# Patient Record
Sex: MALE | Race: WHITE | Employment: UNEMPLOYED | URBAN - METROPOLITAN AREA
[De-identification: names, ages, dates, MRNs, and addresses within clinical notes are randomized per-mention and may not be internally consistent; named-entity substitution may affect disease eponyms.]

---

## 2023-07-27 ENCOUNTER — APPOINTMENT (OUTPATIENT)
Dept: MRI IMAGING | Age: 58
DRG: 045 | End: 2023-07-27
Attending: INTERNAL MEDICINE
Payer: MEDICAID

## 2023-07-27 ENCOUNTER — HOSPITAL ENCOUNTER (INPATIENT)
Age: 58
LOS: 7 days | Discharge: HOME | DRG: 045 | End: 2023-08-03
Attending: INTERNAL MEDICINE | Admitting: INTERNAL MEDICINE
Payer: MEDICAID

## 2023-07-27 PROBLEM — I16.1 HYPERTENSIVE EMERGENCY: Status: ACTIVE | Noted: 2023-07-27

## 2023-07-27 LAB
ALBUMIN SERPL-MCNC: 4.1 GM/DL (ref 3.4–5)
ALP BLD-CCNC: 116 IU/L (ref 40–128)
ALT SERPL-CCNC: 16 U/L (ref 10–40)
ANION GAP SERPL CALCULATED.3IONS-SCNC: 13 MMOL/L (ref 4–16)
AST SERPL-CCNC: 14 IU/L (ref 15–37)
BILIRUB SERPL-MCNC: 0.5 MG/DL (ref 0–1)
BUN SERPL-MCNC: 18 MG/DL (ref 6–23)
CALCIUM SERPL-MCNC: 8.8 MG/DL (ref 8.3–10.6)
CHLORIDE BLD-SCNC: 96 MMOL/L (ref 99–110)
CHOLEST SERPL-MCNC: 278 MG/DL
CO2: 24 MMOL/L (ref 21–32)
CREAT SERPL-MCNC: 1.1 MG/DL (ref 0.9–1.3)
ESTIMATED AVERAGE GLUCOSE: 143 MG/DL
GFR SERPL CREATININE-BSD FRML MDRD: >60 ML/MIN/1.73M2
GLUCOSE BLD-MCNC: 137 MG/DL (ref 70–99)
GLUCOSE BLD-MCNC: 150 MG/DL (ref 70–99)
GLUCOSE BLD-MCNC: 167 MG/DL (ref 70–99)
GLUCOSE BLD-MCNC: 176 MG/DL (ref 70–99)
GLUCOSE SERPL-MCNC: 156 MG/DL (ref 70–99)
HBA1C MFR BLD: 6.6 % (ref 4.2–6.3)
HCT VFR BLD CALC: 42.3 % (ref 42–52)
HDLC SERPL-MCNC: 36 MG/DL
HEMOGLOBIN: 14 GM/DL (ref 13.5–18)
LDLC SERPL CALC-MCNC: 190 MG/DL
LV EF: 58 %
LVEF MODALITY: NORMAL
MCH RBC QN AUTO: 27.5 PG (ref 27–31)
MCHC RBC AUTO-ENTMCNC: 33.1 % (ref 32–36)
MCV RBC AUTO: 82.9 FL (ref 78–100)
PDW BLD-RTO: 12.1 % (ref 11.7–14.9)
PLATELET # BLD: 236 K/CU MM (ref 140–440)
PMV BLD AUTO: 9.6 FL (ref 7.5–11.1)
POTASSIUM SERPL-SCNC: 3.7 MMOL/L (ref 3.5–5.1)
RBC # BLD: 5.1 M/CU MM (ref 4.6–6.2)
SODIUM BLD-SCNC: 133 MMOL/L (ref 135–145)
TOTAL PROTEIN: 6.5 GM/DL (ref 6.4–8.2)
TRIGL SERPL-MCNC: 261 MG/DL
WBC # BLD: 11.2 K/CU MM (ref 4–10.5)

## 2023-07-27 PROCEDURE — 82962 GLUCOSE BLOOD TEST: CPT

## 2023-07-27 PROCEDURE — 83036 HEMOGLOBIN GLYCOSYLATED A1C: CPT

## 2023-07-27 PROCEDURE — 97535 SELF CARE MNGMENT TRAINING: CPT

## 2023-07-27 PROCEDURE — 6370000000 HC RX 637 (ALT 250 FOR IP): Performed by: NURSE PRACTITIONER

## 2023-07-27 PROCEDURE — 2500000003 HC RX 250 WO HCPCS: Performed by: INTERNAL MEDICINE

## 2023-07-27 PROCEDURE — 6360000002 HC RX W HCPCS: Performed by: INTERNAL MEDICINE

## 2023-07-27 PROCEDURE — 94761 N-INVAS EAR/PLS OXIMETRY MLT: CPT

## 2023-07-27 PROCEDURE — 70551 MRI BRAIN STEM W/O DYE: CPT

## 2023-07-27 PROCEDURE — 36415 COLL VENOUS BLD VENIPUNCTURE: CPT

## 2023-07-27 PROCEDURE — 85027 COMPLETE CBC AUTOMATED: CPT

## 2023-07-27 PROCEDURE — 93306 TTE W/DOPPLER COMPLETE: CPT

## 2023-07-27 PROCEDURE — 80053 COMPREHEN METABOLIC PANEL: CPT

## 2023-07-27 PROCEDURE — 99255 IP/OBS CONSLTJ NEW/EST HI 80: CPT | Performed by: PSYCHIATRY & NEUROLOGY

## 2023-07-27 PROCEDURE — 6370000000 HC RX 637 (ALT 250 FOR IP): Performed by: INTERNAL MEDICINE

## 2023-07-27 PROCEDURE — 2140000000 HC CCU INTERMEDIATE R&B

## 2023-07-27 PROCEDURE — 80061 LIPID PANEL: CPT

## 2023-07-27 PROCEDURE — 97116 GAIT TRAINING THERAPY: CPT

## 2023-07-27 PROCEDURE — 92610 EVALUATE SWALLOWING FUNCTION: CPT

## 2023-07-27 PROCEDURE — 2580000003 HC RX 258: Performed by: INTERNAL MEDICINE

## 2023-07-27 PROCEDURE — 97162 PT EVAL MOD COMPLEX 30 MIN: CPT

## 2023-07-27 PROCEDURE — 97166 OT EVAL MOD COMPLEX 45 MIN: CPT

## 2023-07-27 RX ORDER — ACETAMINOPHEN 500 MG
1000 TABLET ORAL ONCE
Status: COMPLETED | OUTPATIENT
Start: 2023-07-27 | End: 2023-07-27

## 2023-07-27 RX ORDER — AMLODIPINE BESYLATE 5 MG/1
5 TABLET ORAL DAILY
Status: DISCONTINUED | OUTPATIENT
Start: 2023-07-27 | End: 2023-07-28

## 2023-07-27 RX ORDER — LISINOPRIL AND HYDROCHLOROTHIAZIDE 20; 12.5 MG/1; MG/1
1 TABLET ORAL DAILY
Status: ON HOLD | COMMUNITY
End: 2023-08-02 | Stop reason: HOSPADM

## 2023-07-27 RX ORDER — ACETAMINOPHEN 325 MG/1
650 TABLET ORAL EVERY 4 HOURS PRN
Status: DISCONTINUED | OUTPATIENT
Start: 2023-07-27 | End: 2023-08-03 | Stop reason: HOSPADM

## 2023-07-27 RX ORDER — ONDANSETRON 4 MG/1
4 TABLET, ORALLY DISINTEGRATING ORAL EVERY 8 HOURS PRN
Status: DISCONTINUED | OUTPATIENT
Start: 2023-07-27 | End: 2023-08-03 | Stop reason: HOSPADM

## 2023-07-27 RX ORDER — LISINOPRIL AND HYDROCHLOROTHIAZIDE 20; 12.5 MG/1; MG/1
1 TABLET ORAL DAILY
Status: DISCONTINUED | OUTPATIENT
Start: 2023-07-27 | End: 2023-07-29

## 2023-07-27 RX ORDER — ASPIRIN 81 MG/1
81 TABLET, CHEWABLE ORAL DAILY
Status: DISCONTINUED | OUTPATIENT
Start: 2023-07-28 | End: 2023-08-03 | Stop reason: HOSPADM

## 2023-07-27 RX ORDER — INSULIN LISPRO 100 [IU]/ML
0-4 INJECTION, SOLUTION INTRAVENOUS; SUBCUTANEOUS NIGHTLY
Status: DISCONTINUED | OUTPATIENT
Start: 2023-07-27 | End: 2023-08-03 | Stop reason: HOSPADM

## 2023-07-27 RX ORDER — CLOPIDOGREL BISULFATE 75 MG/1
75 TABLET ORAL DAILY
Status: DISCONTINUED | OUTPATIENT
Start: 2023-07-27 | End: 2023-08-03 | Stop reason: HOSPADM

## 2023-07-27 RX ORDER — DEXTROSE MONOHYDRATE 100 MG/ML
INJECTION, SOLUTION INTRAVENOUS CONTINUOUS PRN
Status: DISCONTINUED | OUTPATIENT
Start: 2023-07-27 | End: 2023-08-03 | Stop reason: HOSPADM

## 2023-07-27 RX ORDER — LABETALOL HYDROCHLORIDE 5 MG/ML
10 INJECTION, SOLUTION INTRAVENOUS EVERY 10 MIN PRN
Status: DISCONTINUED | OUTPATIENT
Start: 2023-07-27 | End: 2023-08-03 | Stop reason: HOSPADM

## 2023-07-27 RX ORDER — INSULIN LISPRO 100 [IU]/ML
0-8 INJECTION, SOLUTION INTRAVENOUS; SUBCUTANEOUS
Status: DISCONTINUED | OUTPATIENT
Start: 2023-07-27 | End: 2023-08-03 | Stop reason: HOSPADM

## 2023-07-27 RX ORDER — ATORVASTATIN CALCIUM 40 MG/1
40 TABLET, FILM COATED ORAL DAILY
Status: ON HOLD | COMMUNITY
End: 2023-08-02 | Stop reason: HOSPADM

## 2023-07-27 RX ORDER — GLUCAGON 1 MG/ML
1 KIT INJECTION PRN
Status: DISCONTINUED | OUTPATIENT
Start: 2023-07-27 | End: 2023-08-03 | Stop reason: HOSPADM

## 2023-07-27 RX ORDER — ENOXAPARIN SODIUM 100 MG/ML
30 INJECTION SUBCUTANEOUS 2 TIMES DAILY
Status: DISCONTINUED | OUTPATIENT
Start: 2023-07-27 | End: 2023-08-03 | Stop reason: HOSPADM

## 2023-07-27 RX ORDER — ATORVASTATIN CALCIUM 40 MG/1
40 TABLET, FILM COATED ORAL NIGHTLY
Status: DISCONTINUED | OUTPATIENT
Start: 2023-07-27 | End: 2023-08-03 | Stop reason: HOSPADM

## 2023-07-27 RX ORDER — SODIUM CHLORIDE 0.9 % (FLUSH) 0.9 %
5-40 SYRINGE (ML) INJECTION PRN
Status: DISCONTINUED | OUTPATIENT
Start: 2023-07-27 | End: 2023-08-03 | Stop reason: HOSPADM

## 2023-07-27 RX ORDER — SODIUM CHLORIDE 0.9 % (FLUSH) 0.9 %
5-40 SYRINGE (ML) INJECTION EVERY 12 HOURS SCHEDULED
Status: DISCONTINUED | OUTPATIENT
Start: 2023-07-27 | End: 2023-08-03 | Stop reason: HOSPADM

## 2023-07-27 RX ORDER — SERTRALINE HYDROCHLORIDE 100 MG/1
100 TABLET, FILM COATED ORAL DAILY
COMMUNITY

## 2023-07-27 RX ORDER — ONDANSETRON 2 MG/ML
4 INJECTION INTRAMUSCULAR; INTRAVENOUS EVERY 6 HOURS PRN
Status: DISCONTINUED | OUTPATIENT
Start: 2023-07-27 | End: 2023-08-03 | Stop reason: HOSPADM

## 2023-07-27 RX ORDER — SODIUM CHLORIDE 9 MG/ML
INJECTION, SOLUTION INTRAVENOUS PRN
Status: DISCONTINUED | OUTPATIENT
Start: 2023-07-27 | End: 2023-08-03 | Stop reason: HOSPADM

## 2023-07-27 RX ORDER — ASPIRIN 300 MG/1
300 SUPPOSITORY RECTAL DAILY
Status: DISCONTINUED | OUTPATIENT
Start: 2023-07-28 | End: 2023-08-03 | Stop reason: HOSPADM

## 2023-07-27 RX ORDER — POLYETHYLENE GLYCOL 3350 17 G/17G
17 POWDER, FOR SOLUTION ORAL DAILY PRN
Status: DISCONTINUED | OUTPATIENT
Start: 2023-07-27 | End: 2023-08-03 | Stop reason: HOSPADM

## 2023-07-27 RX ADMIN — SERTRALINE HYDROCHLORIDE 100 MG: 50 TABLET ORAL at 09:12

## 2023-07-27 RX ADMIN — SODIUM CHLORIDE 5 MG/HR: 9 INJECTION, SOLUTION INTRAVENOUS at 19:08

## 2023-07-27 RX ADMIN — SODIUM CHLORIDE 5 MG/HR: 9 INJECTION, SOLUTION INTRAVENOUS at 06:01

## 2023-07-27 RX ADMIN — ACETAMINOPHEN 650 MG: 325 TABLET ORAL at 22:54

## 2023-07-27 RX ADMIN — SODIUM CHLORIDE 5 MG/HR: 9 INJECTION, SOLUTION INTRAVENOUS at 11:51

## 2023-07-27 RX ADMIN — ACETAMINOPHEN 650 MG: 325 TABLET ORAL at 18:58

## 2023-07-27 RX ADMIN — ATORVASTATIN CALCIUM 40 MG: 40 TABLET, FILM COATED ORAL at 20:46

## 2023-07-27 RX ADMIN — LABETALOL HYDROCHLORIDE 10 MG: 5 INJECTION, SOLUTION INTRAVENOUS at 05:44

## 2023-07-27 RX ADMIN — ENOXAPARIN SODIUM 30 MG: 100 INJECTION SUBCUTANEOUS at 20:46

## 2023-07-27 RX ADMIN — ACETAMINOPHEN 1000 MG: 500 TABLET ORAL at 05:43

## 2023-07-27 RX ADMIN — SODIUM CHLORIDE, PRESERVATIVE FREE 5 ML: 5 INJECTION INTRAVENOUS at 09:13

## 2023-07-27 RX ADMIN — ENOXAPARIN SODIUM 30 MG: 100 INJECTION SUBCUTANEOUS at 09:12

## 2023-07-27 RX ADMIN — CLOPIDOGREL BISULFATE 75 MG: 75 TABLET ORAL at 16:39

## 2023-07-27 RX ADMIN — SODIUM CHLORIDE 7.5 MG/HR: 9 INJECTION, SOLUTION INTRAVENOUS at 22:53

## 2023-07-27 ASSESSMENT — PAIN SCALES - WONG BAKER: WONGBAKER_NUMERICALRESPONSE: 0

## 2023-07-27 ASSESSMENT — PAIN DESCRIPTION - LOCATION
LOCATION: HEAD

## 2023-07-27 ASSESSMENT — PAIN SCALES - GENERAL
PAINLEVEL_OUTOF10: 10
PAINLEVEL_OUTOF10: 6
PAINLEVEL_OUTOF10: 0
PAINLEVEL_OUTOF10: 7
PAINLEVEL_OUTOF10: 0

## 2023-07-27 ASSESSMENT — PAIN DESCRIPTION - DESCRIPTORS
DESCRIPTORS: ACHING

## 2023-07-27 ASSESSMENT — PAIN DESCRIPTION - ONSET: ONSET: ON-GOING

## 2023-07-27 NOTE — CONSULTS
Neurology Service Consult Note  [address] [place] [address]   Patient Name: Sruthi Valadez  : 1965        Subjective:   Reason for consult: Dizziness  66 y.o.  male DM, HLD, HTN presenting to [address] [place] [address] with complaints of dizziness over several days. He describes room spinning dizziness that is worse with movement and turning his head. It improves with rest. Today he feels his symptoms have resolved. He does not have any focal or lateralizing weakness on exam today. He does ambulate with the use of a cane as he feels unsteady on his feet. He had hypertensive urgency on admission and admits to chronic poorly controlled blood pressure.        Past Medical History:   Diagnosis Date    Diabetes mellitus ([address])     Hyperlipidemia     Hypertension     :   Past Surgical History:   Procedure Laterality Date    BRAIN SURGERY       Medications:  Scheduled Meds:   sodium chloride flush  5-40 mL IntraVENous 2 times per day    enoxaparin  30 mg SubCUTAneous BID    atorvastatin  40 mg Oral Nightly    [START ON 2023] aspirin  81 mg Oral Daily    Or    [START ON 2023] aspirin  300 mg Rectal Daily    [Held by provider] amLODIPine  5 mg Oral Daily    [Held by provider] lisinopril-hydroCHLOROthiazide  1 tablet Oral Daily    sertraline  100 mg Oral Daily    insulin lispro  0-8 Units SubCUTAneous TID WC    insulin lispro  0-4 Units SubCUTAneous Nightly     Continuous Infusions:   sodium chloride      niCARdipine 5 mg/hr (23 0601)    dextrose       PRN Meds:.sodium chloride flush, sodium chloride, ondansetron **OR** ondansetron, polyethylene glycol, labetalol, acetaminophen, glucose, dextrose bolus **OR** dextrose bolus, glucagon (rDNA), dextrose    No Known Allergies  Social History     Socioeconomic History    Marital status: Single     Spouse name: Not on file    Number of children: Not on file    Years of education: Not on file    Highest education level: Not on file   Occupational
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
## 2023-07-28 ENCOUNTER — APPOINTMENT (OUTPATIENT)
Dept: CT IMAGING | Age: 58
DRG: 045 | End: 2023-07-28
Attending: INTERNAL MEDICINE
Payer: MEDICAID

## 2023-07-28 LAB
ANION GAP SERPL CALCULATED.3IONS-SCNC: 9 MMOL/L (ref 4–16)
BUN SERPL-MCNC: 11 MG/DL (ref 6–23)
CALCIUM SERPL-MCNC: 8.5 MG/DL (ref 8.3–10.6)
CHLORIDE BLD-SCNC: 100 MMOL/L (ref 99–110)
CO2: 24 MMOL/L (ref 21–32)
CREAT SERPL-MCNC: 0.9 MG/DL (ref 0.9–1.3)
GFR SERPL CREATININE-BSD FRML MDRD: >60 ML/MIN/1.73M2
GLUCOSE BLD-MCNC: 121 MG/DL (ref 70–99)
GLUCOSE BLD-MCNC: 129 MG/DL (ref 70–99)
GLUCOSE BLD-MCNC: 134 MG/DL (ref 70–99)
GLUCOSE BLD-MCNC: 152 MG/DL (ref 70–99)
GLUCOSE SERPL-MCNC: 168 MG/DL (ref 70–99)
HCT VFR BLD CALC: 42.8 % (ref 42–52)
HEMOGLOBIN: 14.3 GM/DL (ref 13.5–18)
MCH RBC QN AUTO: 27.9 PG (ref 27–31)
MCHC RBC AUTO-ENTMCNC: 33.4 % (ref 32–36)
MCV RBC AUTO: 83.6 FL (ref 78–100)
PDW BLD-RTO: 12.2 % (ref 11.7–14.9)
PLATELET # BLD: 237 K/CU MM (ref 140–440)
PMV BLD AUTO: 9.4 FL (ref 7.5–11.1)
POTASSIUM SERPL-SCNC: 3.8 MMOL/L (ref 3.5–5.1)
RBC # BLD: 5.12 M/CU MM (ref 4.6–6.2)
SODIUM BLD-SCNC: 133 MMOL/L (ref 135–145)
WBC # BLD: 9.1 K/CU MM (ref 4–10.5)

## 2023-07-28 PROCEDURE — 2580000003 HC RX 258: Performed by: INTERNAL MEDICINE

## 2023-07-28 PROCEDURE — 94761 N-INVAS EAR/PLS OXIMETRY MLT: CPT

## 2023-07-28 PROCEDURE — 99222 1ST HOSP IP/OBS MODERATE 55: CPT | Performed by: NURSE PRACTITIONER

## 2023-07-28 PROCEDURE — 85027 COMPLETE CBC AUTOMATED: CPT

## 2023-07-28 PROCEDURE — 6370000000 HC RX 637 (ALT 250 FOR IP): Performed by: NURSE PRACTITIONER

## 2023-07-28 PROCEDURE — 82962 GLUCOSE BLOOD TEST: CPT

## 2023-07-28 PROCEDURE — 70498 CT ANGIOGRAPHY NECK: CPT

## 2023-07-28 PROCEDURE — 70450 CT HEAD/BRAIN W/O DYE: CPT

## 2023-07-28 PROCEDURE — 36415 COLL VENOUS BLD VENIPUNCTURE: CPT

## 2023-07-28 PROCEDURE — 6360000002 HC RX W HCPCS: Performed by: INTERNAL MEDICINE

## 2023-07-28 PROCEDURE — 6360000004 HC RX CONTRAST MEDICATION: Performed by: PSYCHIATRY & NEUROLOGY

## 2023-07-28 PROCEDURE — 2500000003 HC RX 250 WO HCPCS: Performed by: INTERNAL MEDICINE

## 2023-07-28 PROCEDURE — 6370000000 HC RX 637 (ALT 250 FOR IP): Performed by: INTERNAL MEDICINE

## 2023-07-28 PROCEDURE — 2140000000 HC CCU INTERMEDIATE R&B

## 2023-07-28 PROCEDURE — 80048 BASIC METABOLIC PNL TOTAL CA: CPT

## 2023-07-28 PROCEDURE — 76937 US GUIDE VASCULAR ACCESS: CPT

## 2023-07-28 RX ORDER — DOXAZOSIN MESYLATE 1 MG/1
2 TABLET ORAL ONCE
Status: COMPLETED | OUTPATIENT
Start: 2023-07-28 | End: 2023-07-28

## 2023-07-28 RX ORDER — HYDRALAZINE HYDROCHLORIDE 25 MG/1
25 TABLET, FILM COATED ORAL EVERY 8 HOURS SCHEDULED
Status: DISCONTINUED | OUTPATIENT
Start: 2023-07-29 | End: 2023-07-29

## 2023-07-28 RX ORDER — AMLODIPINE BESYLATE 5 MG/1
5 TABLET ORAL ONCE
Status: COMPLETED | OUTPATIENT
Start: 2023-07-28 | End: 2023-07-28

## 2023-07-28 RX ORDER — AMLODIPINE BESYLATE 10 MG/1
10 TABLET ORAL DAILY
Status: DISCONTINUED | OUTPATIENT
Start: 2023-07-29 | End: 2023-08-03 | Stop reason: HOSPADM

## 2023-07-28 RX ADMIN — SODIUM CHLORIDE 5.5 MG/HR: 9 INJECTION, SOLUTION INTRAVENOUS at 11:14

## 2023-07-28 RX ADMIN — AMLODIPINE BESYLATE 5 MG: 5 TABLET ORAL at 08:42

## 2023-07-28 RX ADMIN — SODIUM CHLORIDE 6.5 MG/HR: 9 INJECTION, SOLUTION INTRAVENOUS at 10:58

## 2023-07-28 RX ADMIN — IOPAMIDOL 75 ML: 755 INJECTION, SOLUTION INTRAVENOUS at 12:31

## 2023-07-28 RX ADMIN — ASPIRIN 81 MG: 81 TABLET, CHEWABLE ORAL at 05:49

## 2023-07-28 RX ADMIN — SODIUM CHLORIDE, PRESERVATIVE FREE 10 ML: 5 INJECTION INTRAVENOUS at 20:53

## 2023-07-28 RX ADMIN — SODIUM CHLORIDE 3.5 MG/HR: 9 INJECTION, SOLUTION INTRAVENOUS at 20:52

## 2023-07-28 RX ADMIN — SODIUM CHLORIDE 7.5 MG/HR: 9 INJECTION, SOLUTION INTRAVENOUS at 02:43

## 2023-07-28 RX ADMIN — SODIUM CHLORIDE 4.5 MG/HR: 9 INJECTION, SOLUTION INTRAVENOUS at 13:39

## 2023-07-28 RX ADMIN — AMLODIPINE BESYLATE 5 MG: 5 TABLET ORAL at 18:52

## 2023-07-28 RX ADMIN — CLOPIDOGREL BISULFATE 75 MG: 75 TABLET ORAL at 08:42

## 2023-07-28 RX ADMIN — SERTRALINE HYDROCHLORIDE 100 MG: 50 TABLET ORAL at 08:42

## 2023-07-28 RX ADMIN — ENOXAPARIN SODIUM 30 MG: 100 INJECTION SUBCUTANEOUS at 20:53

## 2023-07-28 RX ADMIN — SODIUM CHLORIDE 7.5 MG/HR: 9 INJECTION, SOLUTION INTRAVENOUS at 06:30

## 2023-07-28 RX ADMIN — LISINOPRIL AND HYDROCHLOROTHIAZIDE 1 TABLET: 12.5; 2 TABLET ORAL at 08:42

## 2023-07-28 RX ADMIN — ATORVASTATIN CALCIUM 40 MG: 40 TABLET, FILM COATED ORAL at 20:53

## 2023-07-28 RX ADMIN — ACETAMINOPHEN 650 MG: 325 TABLET ORAL at 17:03

## 2023-07-28 RX ADMIN — DOXAZOSIN 2 MG: 1 TABLET ORAL at 18:52

## 2023-07-28 RX ADMIN — ACETAMINOPHEN 650 MG: 325 TABLET ORAL at 10:59

## 2023-07-28 RX ADMIN — ACETAMINOPHEN 650 MG: 325 TABLET ORAL at 23:31

## 2023-07-28 RX ADMIN — SODIUM CHLORIDE 3.5 MG/HR: 9 INJECTION, SOLUTION INTRAVENOUS at 14:23

## 2023-07-28 RX ADMIN — ENOXAPARIN SODIUM 30 MG: 100 INJECTION SUBCUTANEOUS at 08:42

## 2023-07-28 ASSESSMENT — PAIN DESCRIPTION - DESCRIPTORS
DESCRIPTORS: ACHING

## 2023-07-28 ASSESSMENT — PAIN DESCRIPTION - ORIENTATION
ORIENTATION: MID;OTHER (COMMENT)
ORIENTATION: OTHER (COMMENT)

## 2023-07-28 ASSESSMENT — PAIN DESCRIPTION - LOCATION
LOCATION: HEAD

## 2023-07-28 ASSESSMENT — PAIN - FUNCTIONAL ASSESSMENT
PAIN_FUNCTIONAL_ASSESSMENT: ACTIVITIES ARE NOT PREVENTED
PAIN_FUNCTIONAL_ASSESSMENT: PREVENTS OR INTERFERES SOME ACTIVE ACTIVITIES AND ADLS

## 2023-07-28 ASSESSMENT — PAIN SCALES - GENERAL
PAINLEVEL_OUTOF10: 8
PAINLEVEL_OUTOF10: 5
PAINLEVEL_OUTOF10: 9

## 2023-07-28 NOTE — CONSULTS
Vascular/Interventional Neurology Service Consult Note  [address] [place] [address]   Patient Name: Juan Patel  : 1965        Subjective:   Reason for consult:   Phoenix hansenmale past medical history HTN, HLD, DM presenting to [address] [place] [address] complaints of dizziness, off balance and headache for 2 to 3 days prior. He denied any speech changes or focal weaknesses. Patient was hypertensive in the ED requiring nicardipine drip. MRI revealed 3 small periventricular areas of diffusion restriction, likely acute infarcts. CTA moderate to severe stenosis of the origin of the P1 segment of the right PCA. Moderate diffuse irregularity involving the proximal segments of the left PCA. Patient states his dizziness has improved. He admits to noncompliance with his blood pressure medications.     Past Medical History:   Diagnosis Date    Diabetes mellitus ([address])     Hyperlipidemia     Hypertension     :   Past Surgical History:   Procedure Laterality Date    BRAIN SURGERY       Medications:  Scheduled Meds:   sodium chloride flush  5-40 mL IntraVENous 2 times per day    enoxaparin  30 mg SubCUTAneous BID    atorvastatin  40 mg Oral Nightly    aspirin  81 mg Oral Daily    Or    aspirin  300 mg Rectal Daily    amLODIPine  5 mg Oral Daily    lisinopril-hydroCHLOROthiazide  1 tablet Oral Daily    sertraline  100 mg Oral Daily    insulin lispro  0-8 Units SubCUTAneous TID WC    insulin lispro  0-4 Units SubCUTAneous Nightly    clopidogrel  75 mg Oral Daily     Continuous Infusions:   sodium chloride      dextrose      niCARdipine 3.5 mg/hr (23 1423)     PRN Meds:.sodium chloride flush, sodium chloride, ondansetron **OR** ondansetron, polyethylene glycol, labetalol, acetaminophen, glucose, dextrose bolus **OR** dextrose bolus, glucagon (rDNA), dextrose    No Known Allergies  Social History     Socioeconomic History    Marital status: Single     Spouse name: Not on file    Number of

## 2023-07-29 LAB
ANION GAP SERPL CALCULATED.3IONS-SCNC: 12 MMOL/L (ref 4–16)
BUN SERPL-MCNC: 11 MG/DL (ref 6–23)
CALCIUM SERPL-MCNC: 8.5 MG/DL (ref 8.3–10.6)
CHLORIDE BLD-SCNC: 100 MMOL/L (ref 99–110)
CO2: 22 MMOL/L (ref 21–32)
CREAT SERPL-MCNC: 1 MG/DL (ref 0.9–1.3)
GFR SERPL CREATININE-BSD FRML MDRD: >60 ML/MIN/1.73M2
GLUCOSE BLD-MCNC: 138 MG/DL (ref 70–99)
GLUCOSE BLD-MCNC: 179 MG/DL (ref 70–99)
GLUCOSE BLD-MCNC: 268 MG/DL (ref 70–99)
GLUCOSE SERPL-MCNC: 146 MG/DL (ref 70–99)
HCT VFR BLD CALC: 42.3 % (ref 42–52)
HEMOGLOBIN: 14 GM/DL (ref 13.5–18)
MCH RBC QN AUTO: 28 PG (ref 27–31)
MCHC RBC AUTO-ENTMCNC: 33.1 % (ref 32–36)
MCV RBC AUTO: 84.6 FL (ref 78–100)
PDW BLD-RTO: 12.2 % (ref 11.7–14.9)
PLATELET # BLD: 234 K/CU MM (ref 140–440)
PMV BLD AUTO: 9.6 FL (ref 7.5–11.1)
POTASSIUM SERPL-SCNC: 3.8 MMOL/L (ref 3.5–5.1)
RBC # BLD: 5 M/CU MM (ref 4.6–6.2)
SODIUM BLD-SCNC: 134 MMOL/L (ref 135–145)
WBC # BLD: 8.4 K/CU MM (ref 4–10.5)

## 2023-07-29 PROCEDURE — 6370000000 HC RX 637 (ALT 250 FOR IP): Performed by: FAMILY MEDICINE

## 2023-07-29 PROCEDURE — 82962 GLUCOSE BLOOD TEST: CPT

## 2023-07-29 PROCEDURE — 36415 COLL VENOUS BLD VENIPUNCTURE: CPT

## 2023-07-29 PROCEDURE — 2500000003 HC RX 250 WO HCPCS: Performed by: INTERNAL MEDICINE

## 2023-07-29 PROCEDURE — 6360000002 HC RX W HCPCS: Performed by: INTERNAL MEDICINE

## 2023-07-29 PROCEDURE — 94761 N-INVAS EAR/PLS OXIMETRY MLT: CPT

## 2023-07-29 PROCEDURE — 94664 DEMO&/EVAL PT USE INHALER: CPT

## 2023-07-29 PROCEDURE — 6370000000 HC RX 637 (ALT 250 FOR IP): Performed by: INTERNAL MEDICINE

## 2023-07-29 PROCEDURE — 2140000000 HC CCU INTERMEDIATE R&B

## 2023-07-29 PROCEDURE — 94150 VITAL CAPACITY TEST: CPT

## 2023-07-29 PROCEDURE — 85027 COMPLETE CBC AUTOMATED: CPT

## 2023-07-29 PROCEDURE — 6370000000 HC RX 637 (ALT 250 FOR IP): Performed by: NURSE PRACTITIONER

## 2023-07-29 PROCEDURE — 80048 BASIC METABOLIC PNL TOTAL CA: CPT

## 2023-07-29 PROCEDURE — 2580000003 HC RX 258: Performed by: INTERNAL MEDICINE

## 2023-07-29 RX ORDER — OXYCODONE HYDROCHLORIDE 5 MG/1
5 TABLET ORAL ONCE
Status: COMPLETED | OUTPATIENT
Start: 2023-07-29 | End: 2023-07-29

## 2023-07-29 RX ORDER — CHOLECALCIFEROL (VITAMIN D3) 125 MCG
5 CAPSULE ORAL NIGHTLY PRN
Status: DISCONTINUED | OUTPATIENT
Start: 2023-07-29 | End: 2023-08-03 | Stop reason: HOSPADM

## 2023-07-29 RX ORDER — LISINOPRIL 20 MG/1
40 TABLET ORAL DAILY
Status: DISCONTINUED | OUTPATIENT
Start: 2023-07-30 | End: 2023-08-03 | Stop reason: HOSPADM

## 2023-07-29 RX ORDER — LISINOPRIL AND HYDROCHLOROTHIAZIDE 20; 12.5 MG/1; MG/1
1 TABLET ORAL 2 TIMES DAILY
Status: DISCONTINUED | OUTPATIENT
Start: 2023-07-29 | End: 2023-07-29

## 2023-07-29 RX ORDER — ISOSORBIDE MONONITRATE 30 MG/1
60 TABLET, EXTENDED RELEASE ORAL DAILY
Status: DISCONTINUED | OUTPATIENT
Start: 2023-07-29 | End: 2023-07-30

## 2023-07-29 RX ORDER — HYDRALAZINE HYDROCHLORIDE 50 MG/1
50 TABLET, FILM COATED ORAL ONCE
Status: DISCONTINUED | OUTPATIENT
Start: 2023-07-29 | End: 2023-07-29

## 2023-07-29 RX ADMIN — ENOXAPARIN SODIUM 30 MG: 100 INJECTION SUBCUTANEOUS at 08:48

## 2023-07-29 RX ADMIN — SODIUM CHLORIDE 3.5 MG/HR: 9 INJECTION, SOLUTION INTRAVENOUS at 04:47

## 2023-07-29 RX ADMIN — ISOSORBIDE MONONITRATE 60 MG: 30 TABLET, EXTENDED RELEASE ORAL at 12:33

## 2023-07-29 RX ADMIN — OXYCODONE HYDROCHLORIDE 5 MG: 5 TABLET ORAL at 18:21

## 2023-07-29 RX ADMIN — HYDRALAZINE HYDROCHLORIDE 25 MG: 25 TABLET, FILM COATED ORAL at 08:49

## 2023-07-29 RX ADMIN — ACETAMINOPHEN 650 MG: 325 TABLET ORAL at 23:23

## 2023-07-29 RX ADMIN — SERTRALINE HYDROCHLORIDE 100 MG: 50 TABLET ORAL at 08:48

## 2023-07-29 RX ADMIN — ATORVASTATIN CALCIUM 40 MG: 40 TABLET, FILM COATED ORAL at 20:46

## 2023-07-29 RX ADMIN — LISINOPRIL AND HYDROCHLOROTHIAZIDE 1 TABLET: 12.5; 2 TABLET ORAL at 08:48

## 2023-07-29 RX ADMIN — SODIUM CHLORIDE, PRESERVATIVE FREE 10 ML: 5 INJECTION INTRAVENOUS at 08:49

## 2023-07-29 RX ADMIN — INSULIN LISPRO 4 UNITS: 100 INJECTION, SOLUTION INTRAVENOUS; SUBCUTANEOUS at 13:20

## 2023-07-29 RX ADMIN — AMLODIPINE BESYLATE 10 MG: 10 TABLET ORAL at 08:48

## 2023-07-29 RX ADMIN — CLOPIDOGREL BISULFATE 75 MG: 75 TABLET ORAL at 08:48

## 2023-07-29 RX ADMIN — ENOXAPARIN SODIUM 30 MG: 100 INJECTION SUBCUTANEOUS at 20:46

## 2023-07-29 RX ADMIN — SODIUM CHLORIDE, PRESERVATIVE FREE 10 ML: 5 INJECTION INTRAVENOUS at 20:46

## 2023-07-29 RX ADMIN — ASPIRIN 81 MG: 81 TABLET, CHEWABLE ORAL at 08:48

## 2023-07-29 RX ADMIN — ACETAMINOPHEN 650 MG: 325 TABLET ORAL at 15:40

## 2023-07-29 RX ADMIN — HYDRALAZINE HYDROCHLORIDE 25 MG: 25 TABLET, FILM COATED ORAL at 00:36

## 2023-07-29 RX ADMIN — Medication 5 MG: at 01:57

## 2023-07-29 ASSESSMENT — PAIN DESCRIPTION - LOCATION
LOCATION: HEAD

## 2023-07-29 ASSESSMENT — PAIN SCALES - GENERAL
PAINLEVEL_OUTOF10: 10
PAINLEVEL_OUTOF10: 10
PAINLEVEL_OUTOF10: 5

## 2023-07-29 ASSESSMENT — PAIN DESCRIPTION - DESCRIPTORS
DESCRIPTORS: ACHING
DESCRIPTORS: ACHING

## 2023-07-29 ASSESSMENT — PAIN DESCRIPTION - ORIENTATION
ORIENTATION: MID;OTHER (COMMENT)
ORIENTATION: MID

## 2023-07-29 NOTE — CONSULTS
Nephrology Service Consultation      [address], [address], [address] [place] [address]  Phone: [phone]  Office Hours: 8:30AM - 4:30PM  Monday - Friday        MEDICAL DECISION MAKING and Recommendations   -Hypertensive emergency  -Medication noncompliance  -CKD1 on account of having DM2  -Hyponatremia  -DM2    Suggest;   -Maximize amlodipine to 10mg daily//  -Maximize lisinopril to 40mg daily//has been hyponatremic since admission, HCTZ may not be a good long term antihypertensive option as it can cause hyponatremia  -Ok to continue hydralazine, although wondering if he would be more compliant with once  a day meds, rather than bid or TID meds//may try imdur er? Thank you            Patient Active Problem List    Diagnosis Date Noted    Hypertensive emergency 07/27/2023         Patient:  Nafisa Bates  MRN: [id number]  Consulting physician:  Kandy Arce MD  Reason for Consult:   PCP: Aldo Marcus MD    HISTORY OF PRESENT ILLNESS:   The patient is a 66 y.o. male with DM2, HTN, presented with hypertensive emergency  He has some noncompliance difficulties with his BP meds  Renal consult for BP mgmt  He is resting on room air    REVIEW OF SYSTEMS:  Can not obtain due to drowsiness    Past Medical History:        Diagnosis Date    Diabetes mellitus ([address])     Hyperlipidemia     Hypertension        Past Surgical History:        Procedure Laterality Date    BRAIN SURGERY         Medications:   Prior to Admission medications    Medication Sig Start Date End Date Taking?  Authorizing Provider   atorvastatin (LIPITOR) 40 MG tablet Take 1 tablet by mouth daily   Yes Historical Provider, MD   lisinopril-hydroCHLOROthiazide (PRINZIDE;ZESTORETIC) 20-12.5 MG per tablet Take 1 tablet by mouth daily   Yes Historical Provider, MD   sertraline (ZOLOFT) 100 MG tablet Take 1 tablet by mouth daily   Yes Historical Provider, MD   metFORMIN (GLUCOPHAGE) 500 MG tablet Take 1 tablet by mouth 2 times daily (with

## 2023-07-30 LAB
ANION GAP SERPL CALCULATED.3IONS-SCNC: 8 MMOL/L (ref 4–16)
BILIRUBIN URINE: NEGATIVE MG/DL
BLOOD, URINE: NEGATIVE
BUN SERPL-MCNC: 15 MG/DL (ref 6–23)
CALCIUM SERPL-MCNC: 8.6 MG/DL (ref 8.3–10.6)
CHLORIDE BLD-SCNC: 101 MMOL/L (ref 99–110)
CLARITY: CLEAR
CO2: 27 MMOL/L (ref 21–32)
COLOR: YELLOW
COMMENT UA: ABNORMAL
CREAT SERPL-MCNC: 1.1 MG/DL (ref 0.9–1.3)
GFR SERPL CREATININE-BSD FRML MDRD: >60 ML/MIN/1.73M2
GLUCOSE BLD-MCNC: 149 MG/DL (ref 70–99)
GLUCOSE BLD-MCNC: 150 MG/DL (ref 70–99)
GLUCOSE BLD-MCNC: 174 MG/DL (ref 70–99)
GLUCOSE BLD-MCNC: 201 MG/DL (ref 70–99)
GLUCOSE SERPL-MCNC: 144 MG/DL (ref 70–99)
GLUCOSE, URINE: 500 MG/DL
HCT VFR BLD CALC: 40.6 % (ref 42–52)
HEMOGLOBIN: 13.1 GM/DL (ref 13.5–18)
KETONES, URINE: NEGATIVE MG/DL
LEUKOCYTE ESTERASE, URINE: NEGATIVE
MCH RBC QN AUTO: 27.7 PG (ref 27–31)
MCHC RBC AUTO-ENTMCNC: 32.3 % (ref 32–36)
MCV RBC AUTO: 85.8 FL (ref 78–100)
NITRITE URINE, QUANTITATIVE: NEGATIVE
PDW BLD-RTO: 12.2 % (ref 11.7–14.9)
PH, URINE: 5 (ref 5–8)
PLATELET # BLD: 205 K/CU MM (ref 140–440)
PMV BLD AUTO: 9.8 FL (ref 7.5–11.1)
POTASSIUM SERPL-SCNC: 4.4 MMOL/L (ref 3.5–5.1)
PROTEIN UA: NEGATIVE MG/DL
RBC # BLD: 4.73 M/CU MM (ref 4.6–6.2)
SODIUM BLD-SCNC: 136 MMOL/L (ref 135–145)
SPECIFIC GRAVITY UA: 1.02 (ref 1–1.03)
UROBILINOGEN, URINE: 2 MG/DL (ref 0.2–1)
WBC # BLD: 9.3 K/CU MM (ref 4–10.5)

## 2023-07-30 PROCEDURE — 36415 COLL VENOUS BLD VENIPUNCTURE: CPT

## 2023-07-30 PROCEDURE — 85027 COMPLETE CBC AUTOMATED: CPT

## 2023-07-30 PROCEDURE — 6370000000 HC RX 637 (ALT 250 FOR IP): Performed by: INTERNAL MEDICINE

## 2023-07-30 PROCEDURE — 2580000003 HC RX 258: Performed by: INTERNAL MEDICINE

## 2023-07-30 PROCEDURE — 6360000002 HC RX W HCPCS: Performed by: INTERNAL MEDICINE

## 2023-07-30 PROCEDURE — 2140000000 HC CCU INTERMEDIATE R&B

## 2023-07-30 PROCEDURE — 80048 BASIC METABOLIC PNL TOTAL CA: CPT

## 2023-07-30 PROCEDURE — 82962 GLUCOSE BLOOD TEST: CPT

## 2023-07-30 PROCEDURE — 51798 US URINE CAPACITY MEASURE: CPT

## 2023-07-30 PROCEDURE — 81003 URINALYSIS AUTO W/O SCOPE: CPT

## 2023-07-30 PROCEDURE — 6370000000 HC RX 637 (ALT 250 FOR IP)

## 2023-07-30 PROCEDURE — 6370000000 HC RX 637 (ALT 250 FOR IP): Performed by: NURSE PRACTITIONER

## 2023-07-30 PROCEDURE — 94761 N-INVAS EAR/PLS OXIMETRY MLT: CPT

## 2023-07-30 RX ORDER — ISOSORBIDE MONONITRATE 30 MG/1
120 TABLET, EXTENDED RELEASE ORAL DAILY
Status: DISCONTINUED | OUTPATIENT
Start: 2023-07-31 | End: 2023-08-03

## 2023-07-30 RX ORDER — ISOSORBIDE MONONITRATE 30 MG/1
60 TABLET, EXTENDED RELEASE ORAL DAILY
Status: COMPLETED | OUTPATIENT
Start: 2023-07-30 | End: 2023-07-30

## 2023-07-30 RX ORDER — OXYCODONE HYDROCHLORIDE 5 MG/1
5 TABLET ORAL ONCE
Status: COMPLETED | OUTPATIENT
Start: 2023-07-30 | End: 2023-07-30

## 2023-07-30 RX ORDER — SODIUM CHLORIDE 9 MG/ML
INJECTION, SOLUTION INTRAVENOUS CONTINUOUS
Status: DISCONTINUED | OUTPATIENT
Start: 2023-07-30 | End: 2023-07-30

## 2023-07-30 RX ORDER — SODIUM CHLORIDE 9 MG/ML
INJECTION, SOLUTION INTRAVENOUS CONTINUOUS
Status: DISPENSED | OUTPATIENT
Start: 2023-07-30 | End: 2023-07-31

## 2023-07-30 RX ADMIN — SODIUM CHLORIDE, PRESERVATIVE FREE 10 ML: 5 INJECTION INTRAVENOUS at 19:59

## 2023-07-30 RX ADMIN — ATORVASTATIN CALCIUM 40 MG: 40 TABLET, FILM COATED ORAL at 19:59

## 2023-07-30 RX ADMIN — OXYCODONE HYDROCHLORIDE 5 MG: 5 TABLET ORAL at 21:40

## 2023-07-30 RX ADMIN — ACETAMINOPHEN 650 MG: 325 TABLET ORAL at 12:28

## 2023-07-30 RX ADMIN — AMLODIPINE BESYLATE 10 MG: 10 TABLET ORAL at 08:30

## 2023-07-30 RX ADMIN — ACETAMINOPHEN 650 MG: 325 TABLET ORAL at 16:57

## 2023-07-30 RX ADMIN — ISOSORBIDE MONONITRATE 60 MG: 30 TABLET, EXTENDED RELEASE ORAL at 08:29

## 2023-07-30 RX ADMIN — ENOXAPARIN SODIUM 30 MG: 100 INJECTION SUBCUTANEOUS at 08:29

## 2023-07-30 RX ADMIN — SODIUM CHLORIDE, PRESERVATIVE FREE 10 ML: 5 INJECTION INTRAVENOUS at 08:30

## 2023-07-30 RX ADMIN — CLOPIDOGREL BISULFATE 75 MG: 75 TABLET ORAL at 08:30

## 2023-07-30 RX ADMIN — ISOSORBIDE MONONITRATE 60 MG: 30 TABLET, EXTENDED RELEASE ORAL at 19:59

## 2023-07-30 RX ADMIN — LISINOPRIL 40 MG: 20 TABLET ORAL at 08:29

## 2023-07-30 RX ADMIN — ASPIRIN 81 MG: 81 TABLET, CHEWABLE ORAL at 08:30

## 2023-07-30 RX ADMIN — ENOXAPARIN SODIUM 30 MG: 100 INJECTION SUBCUTANEOUS at 19:59

## 2023-07-30 RX ADMIN — SODIUM CHLORIDE: 9 INJECTION, SOLUTION INTRAVENOUS at 11:53

## 2023-07-30 RX ADMIN — SERTRALINE HYDROCHLORIDE 100 MG: 50 TABLET ORAL at 08:30

## 2023-07-30 ASSESSMENT — PAIN DESCRIPTION - LOCATION
LOCATION: HEAD
LOCATION: HEAD

## 2023-07-30 ASSESSMENT — PAIN DESCRIPTION - DESCRIPTORS
DESCRIPTORS: ACHING
DESCRIPTORS: STABBING

## 2023-07-30 ASSESSMENT — PAIN SCALES - GENERAL
PAINLEVEL_OUTOF10: 0
PAINLEVEL_OUTOF10: 9
PAINLEVEL_OUTOF10: 0
PAINLEVEL_OUTOF10: 10
PAINLEVEL_OUTOF10: 4
PAINLEVEL_OUTOF10: 8

## 2023-07-30 ASSESSMENT — PAIN DESCRIPTION - ORIENTATION: ORIENTATION: RIGHT;LEFT

## 2023-07-30 ASSESSMENT — PAIN - FUNCTIONAL ASSESSMENT: PAIN_FUNCTIONAL_ASSESSMENT: ACTIVITIES ARE NOT PREVENTED

## 2023-07-31 ENCOUNTER — APPOINTMENT (OUTPATIENT)
Dept: CT IMAGING | Age: 58
DRG: 045 | End: 2023-07-31
Attending: INTERNAL MEDICINE
Payer: MEDICAID

## 2023-07-31 LAB
ANION GAP SERPL CALCULATED.3IONS-SCNC: 10 MMOL/L (ref 4–16)
BUN SERPL-MCNC: 9 MG/DL (ref 6–23)
CALCIUM SERPL-MCNC: 8.5 MG/DL (ref 8.3–10.6)
CHLORIDE BLD-SCNC: 104 MMOL/L (ref 99–110)
CO2: 25 MMOL/L (ref 21–32)
CREAT SERPL-MCNC: 0.9 MG/DL (ref 0.9–1.3)
GFR SERPL CREATININE-BSD FRML MDRD: >60 ML/MIN/1.73M2
GLUCOSE BLD-MCNC: 140 MG/DL (ref 70–99)
GLUCOSE BLD-MCNC: 149 MG/DL (ref 70–99)
GLUCOSE BLD-MCNC: 189 MG/DL (ref 70–99)
GLUCOSE BLD-MCNC: 236 MG/DL (ref 70–99)
GLUCOSE SERPL-MCNC: 154 MG/DL (ref 70–99)
POTASSIUM SERPL-SCNC: 3.8 MMOL/L (ref 3.5–5.1)
SODIUM BLD-SCNC: 139 MMOL/L (ref 135–145)

## 2023-07-31 PROCEDURE — 6370000000 HC RX 637 (ALT 250 FOR IP): Performed by: INTERNAL MEDICINE

## 2023-07-31 PROCEDURE — 2580000003 HC RX 258: Performed by: INTERNAL MEDICINE

## 2023-07-31 PROCEDURE — 97116 GAIT TRAINING THERAPY: CPT

## 2023-07-31 PROCEDURE — 80048 BASIC METABOLIC PNL TOTAL CA: CPT

## 2023-07-31 PROCEDURE — 6370000000 HC RX 637 (ALT 250 FOR IP): Performed by: NURSE PRACTITIONER

## 2023-07-31 PROCEDURE — 94761 N-INVAS EAR/PLS OXIMETRY MLT: CPT

## 2023-07-31 PROCEDURE — 6370000000 HC RX 637 (ALT 250 FOR IP): Performed by: FAMILY MEDICINE

## 2023-07-31 PROCEDURE — 2140000000 HC CCU INTERMEDIATE R&B

## 2023-07-31 PROCEDURE — 2500000003 HC RX 250 WO HCPCS: Performed by: INTERNAL MEDICINE

## 2023-07-31 PROCEDURE — 36415 COLL VENOUS BLD VENIPUNCTURE: CPT

## 2023-07-31 PROCEDURE — 82962 GLUCOSE BLOOD TEST: CPT

## 2023-07-31 PROCEDURE — 6360000004 HC RX CONTRAST MEDICATION: Performed by: INTERNAL MEDICINE

## 2023-07-31 PROCEDURE — 6360000002 HC RX W HCPCS: Performed by: INTERNAL MEDICINE

## 2023-07-31 PROCEDURE — 74175 CTA ABDOMEN W/CONTRAST: CPT

## 2023-07-31 RX ORDER — LIDOCAINE HYDROCHLORIDE 10 MG/ML
5 INJECTION, SOLUTION EPIDURAL; INFILTRATION; INTRACAUDAL; PERINEURAL ONCE
Status: DISCONTINUED | OUTPATIENT
Start: 2023-07-31 | End: 2023-08-03 | Stop reason: HOSPADM

## 2023-07-31 RX ORDER — HYDRALAZINE HYDROCHLORIDE 50 MG/1
50 TABLET, FILM COATED ORAL EVERY 12 HOURS SCHEDULED
Status: DISCONTINUED | OUTPATIENT
Start: 2023-07-31 | End: 2023-08-01

## 2023-07-31 RX ORDER — SODIUM CHLORIDE 0.9 % (FLUSH) 0.9 %
5-40 SYRINGE (ML) INJECTION EVERY 12 HOURS SCHEDULED
Status: DISCONTINUED | OUTPATIENT
Start: 2023-07-31 | End: 2023-08-03 | Stop reason: HOSPADM

## 2023-07-31 RX ORDER — SODIUM CHLORIDE 9 MG/ML
INJECTION, SOLUTION INTRAVENOUS CONTINUOUS
Status: ACTIVE | OUTPATIENT
Start: 2023-07-31 | End: 2023-07-31

## 2023-07-31 RX ORDER — DOXAZOSIN MESYLATE 4 MG/1
4 TABLET ORAL
Status: DISCONTINUED | OUTPATIENT
Start: 2023-07-31 | End: 2023-08-03 | Stop reason: HOSPADM

## 2023-07-31 RX ORDER — SODIUM CHLORIDE 9 MG/ML
INJECTION, SOLUTION INTRAVENOUS PRN
Status: DISCONTINUED | OUTPATIENT
Start: 2023-07-31 | End: 2023-08-03 | Stop reason: HOSPADM

## 2023-07-31 RX ORDER — SODIUM CHLORIDE 0.9 % (FLUSH) 0.9 %
5-40 SYRINGE (ML) INJECTION PRN
Status: DISCONTINUED | OUTPATIENT
Start: 2023-07-31 | End: 2023-08-03 | Stop reason: HOSPADM

## 2023-07-31 RX ORDER — OXYCODONE HYDROCHLORIDE 5 MG/1
5 TABLET ORAL ONCE
Status: COMPLETED | OUTPATIENT
Start: 2023-07-31 | End: 2023-07-31

## 2023-07-31 RX ADMIN — ASPIRIN 81 MG: 81 TABLET, CHEWABLE ORAL at 10:09

## 2023-07-31 RX ADMIN — ATORVASTATIN CALCIUM 40 MG: 40 TABLET, FILM COATED ORAL at 20:53

## 2023-07-31 RX ADMIN — Medication 5 MG: at 20:51

## 2023-07-31 RX ADMIN — INSULIN LISPRO 2 UNITS: 100 INJECTION, SOLUTION INTRAVENOUS; SUBCUTANEOUS at 12:12

## 2023-07-31 RX ADMIN — ISOSORBIDE MONONITRATE 120 MG: 30 TABLET, EXTENDED RELEASE ORAL at 11:38

## 2023-07-31 RX ADMIN — OXYCODONE HYDROCHLORIDE 5 MG: 5 TABLET ORAL at 16:39

## 2023-07-31 RX ADMIN — ACETAMINOPHEN 650 MG: 325 TABLET ORAL at 02:48

## 2023-07-31 RX ADMIN — HYDRALAZINE HYDROCHLORIDE 50 MG: 50 TABLET, FILM COATED ORAL at 20:51

## 2023-07-31 RX ADMIN — SODIUM CHLORIDE: 9 INJECTION, SOLUTION INTRAVENOUS at 06:26

## 2023-07-31 RX ADMIN — DOXAZOSIN 4 MG: 4 TABLET ORAL at 20:53

## 2023-07-31 RX ADMIN — HYDRALAZINE HYDROCHLORIDE 50 MG: 50 TABLET, FILM COATED ORAL at 11:38

## 2023-07-31 RX ADMIN — CLOPIDOGREL BISULFATE 75 MG: 75 TABLET ORAL at 10:09

## 2023-07-31 RX ADMIN — AMLODIPINE BESYLATE 10 MG: 10 TABLET ORAL at 10:08

## 2023-07-31 RX ADMIN — ENOXAPARIN SODIUM 30 MG: 100 INJECTION SUBCUTANEOUS at 10:14

## 2023-07-31 RX ADMIN — ENOXAPARIN SODIUM 30 MG: 100 INJECTION SUBCUTANEOUS at 20:54

## 2023-07-31 RX ADMIN — SODIUM CHLORIDE, PRESERVATIVE FREE 10 ML: 5 INJECTION INTRAVENOUS at 20:54

## 2023-07-31 RX ADMIN — SODIUM CHLORIDE 2.5 MG/HR: 9 INJECTION, SOLUTION INTRAVENOUS at 16:29

## 2023-07-31 RX ADMIN — SERTRALINE HYDROCHLORIDE 100 MG: 50 TABLET ORAL at 10:08

## 2023-07-31 RX ADMIN — Medication 5 MG: at 02:45

## 2023-07-31 RX ADMIN — LISINOPRIL 40 MG: 20 TABLET ORAL at 11:38

## 2023-07-31 RX ADMIN — SODIUM CHLORIDE, PRESERVATIVE FREE 10 ML: 5 INJECTION INTRAVENOUS at 16:31

## 2023-07-31 RX ADMIN — IOPAMIDOL 80 ML: 755 INJECTION, SOLUTION INTRAVENOUS at 14:31

## 2023-07-31 RX ADMIN — ACETAMINOPHEN 650 MG: 325 TABLET ORAL at 13:18

## 2023-07-31 ASSESSMENT — PAIN DESCRIPTION - LOCATION
LOCATION: HEAD
LOCATION: HEAD

## 2023-07-31 ASSESSMENT — PAIN - FUNCTIONAL ASSESSMENT
PAIN_FUNCTIONAL_ASSESSMENT: PREVENTS OR INTERFERES SOME ACTIVE ACTIVITIES AND ADLS
PAIN_FUNCTIONAL_ASSESSMENT: PREVENTS OR INTERFERES SOME ACTIVE ACTIVITIES AND ADLS

## 2023-07-31 ASSESSMENT — PAIN DESCRIPTION - PAIN TYPE
TYPE: ACUTE PAIN
TYPE: ACUTE PAIN

## 2023-07-31 ASSESSMENT — PAIN SCALES - GENERAL
PAINLEVEL_OUTOF10: 10
PAINLEVEL_OUTOF10: 4
PAINLEVEL_OUTOF10: 3
PAINLEVEL_OUTOF10: 3
PAINLEVEL_OUTOF10: 5
PAINLEVEL_OUTOF10: 0
PAINLEVEL_OUTOF10: 7

## 2023-07-31 ASSESSMENT — PAIN DESCRIPTION - FREQUENCY: FREQUENCY: INTERMITTENT

## 2023-07-31 ASSESSMENT — PAIN DESCRIPTION - DESCRIPTORS
DESCRIPTORS: ACHING;DISCOMFORT;NAGGING;POUNDING
DESCRIPTORS: ACHING;THROBBING;STABBING

## 2023-07-31 ASSESSMENT — PAIN DESCRIPTION - ONSET: ONSET: PROGRESSIVE

## 2023-07-31 NOTE — CONSULTS
IV Consult complete. Introcan Deep Access EDC PIV Inserted in Left Forearm  x 1 attempt using sterile ultrasound guided technique. Brisk blood return, flushes. easy.

## 2023-07-31 NOTE — PLAN OF CARE
Problem: Discharge Planning  Goal: Discharge to home or other facility with appropriate resources  Outcome: Not Progressing   Pt continues to have HTN will continue to  monitor and record changes

## 2023-07-31 NOTE — CARE COORDINATION
CM consult for [employer].  Referral made to Kayleigh/Vibha to follow for assistance.      CM following

## 2023-07-31 NOTE — CARE COORDINATION
Received referral from CM for possible assistance with discharge meds. Pt is pending Medicaid at this time. Verified pt is not on flagged list.  Will follow pt as requested.

## 2023-08-01 LAB
ANION GAP SERPL CALCULATED.3IONS-SCNC: 10 MMOL/L (ref 4–16)
BUN SERPL-MCNC: 10 MG/DL (ref 6–23)
CALCIUM SERPL-MCNC: 8.1 MG/DL (ref 8.3–10.6)
CHLORIDE BLD-SCNC: 103 MMOL/L (ref 99–110)
CO2: 24 MMOL/L (ref 21–32)
CREAT SERPL-MCNC: 0.9 MG/DL (ref 0.9–1.3)
GFR SERPL CREATININE-BSD FRML MDRD: >60 ML/MIN/1.73M2
GLUCOSE BLD-MCNC: 142 MG/DL (ref 70–99)
GLUCOSE BLD-MCNC: 148 MG/DL (ref 70–99)
GLUCOSE BLD-MCNC: 159 MG/DL (ref 70–99)
GLUCOSE SERPL-MCNC: 160 MG/DL (ref 70–99)
POTASSIUM SERPL-SCNC: 3.8 MMOL/L (ref 3.5–5.1)
SODIUM BLD-SCNC: 137 MMOL/L (ref 135–145)

## 2023-08-01 PROCEDURE — 82962 GLUCOSE BLOOD TEST: CPT

## 2023-08-01 PROCEDURE — 2500000003 HC RX 250 WO HCPCS: Performed by: INTERNAL MEDICINE

## 2023-08-01 PROCEDURE — 6370000000 HC RX 637 (ALT 250 FOR IP): Performed by: INTERNAL MEDICINE

## 2023-08-01 PROCEDURE — 6360000002 HC RX W HCPCS: Performed by: INTERNAL MEDICINE

## 2023-08-01 PROCEDURE — 2580000003 HC RX 258: Performed by: INTERNAL MEDICINE

## 2023-08-01 PROCEDURE — 6370000000 HC RX 637 (ALT 250 FOR IP): Performed by: NURSE PRACTITIONER

## 2023-08-01 PROCEDURE — 2140000000 HC CCU INTERMEDIATE R&B

## 2023-08-01 PROCEDURE — 36415 COLL VENOUS BLD VENIPUNCTURE: CPT

## 2023-08-01 PROCEDURE — 94761 N-INVAS EAR/PLS OXIMETRY MLT: CPT

## 2023-08-01 PROCEDURE — 80048 BASIC METABOLIC PNL TOTAL CA: CPT

## 2023-08-01 RX ORDER — HYDRALAZINE HYDROCHLORIDE 50 MG/1
100 TABLET, FILM COATED ORAL EVERY 12 HOURS SCHEDULED
Status: DISCONTINUED | OUTPATIENT
Start: 2023-08-01 | End: 2023-08-02

## 2023-08-01 RX ADMIN — CLOPIDOGREL BISULFATE 75 MG: 75 TABLET ORAL at 08:33

## 2023-08-01 RX ADMIN — SODIUM CHLORIDE 2.5 MG/HR: 9 INJECTION, SOLUTION INTRAVENOUS at 03:39

## 2023-08-01 RX ADMIN — ACETAMINOPHEN 650 MG: 325 TABLET ORAL at 14:16

## 2023-08-01 RX ADMIN — SODIUM CHLORIDE, PRESERVATIVE FREE 10 ML: 5 INJECTION INTRAVENOUS at 08:34

## 2023-08-01 RX ADMIN — SODIUM CHLORIDE, PRESERVATIVE FREE 10 ML: 5 INJECTION INTRAVENOUS at 21:03

## 2023-08-01 RX ADMIN — HYDRALAZINE HYDROCHLORIDE 100 MG: 50 TABLET, FILM COATED ORAL at 08:39

## 2023-08-01 RX ADMIN — ASPIRIN 81 MG: 81 TABLET, CHEWABLE ORAL at 08:33

## 2023-08-01 RX ADMIN — ENOXAPARIN SODIUM 30 MG: 100 INJECTION SUBCUTANEOUS at 08:33

## 2023-08-01 RX ADMIN — HYDRALAZINE HYDROCHLORIDE 100 MG: 50 TABLET, FILM COATED ORAL at 21:04

## 2023-08-01 RX ADMIN — ATORVASTATIN CALCIUM 40 MG: 40 TABLET, FILM COATED ORAL at 21:04

## 2023-08-01 RX ADMIN — ENOXAPARIN SODIUM 30 MG: 100 INJECTION SUBCUTANEOUS at 21:04

## 2023-08-01 RX ADMIN — SODIUM CHLORIDE, PRESERVATIVE FREE 20 ML: 5 INJECTION INTRAVENOUS at 21:03

## 2023-08-01 RX ADMIN — DOXAZOSIN 4 MG: 4 TABLET ORAL at 21:04

## 2023-08-01 RX ADMIN — AMLODIPINE BESYLATE 10 MG: 10 TABLET ORAL at 08:33

## 2023-08-01 RX ADMIN — SERTRALINE HYDROCHLORIDE 100 MG: 50 TABLET ORAL at 08:33

## 2023-08-01 RX ADMIN — ISOSORBIDE MONONITRATE 120 MG: 30 TABLET, EXTENDED RELEASE ORAL at 08:33

## 2023-08-01 RX ADMIN — LISINOPRIL 40 MG: 20 TABLET ORAL at 08:33

## 2023-08-01 ASSESSMENT — PAIN - FUNCTIONAL ASSESSMENT: PAIN_FUNCTIONAL_ASSESSMENT: PREVENTS OR INTERFERES SOME ACTIVE ACTIVITIES AND ADLS

## 2023-08-01 ASSESSMENT — PAIN DESCRIPTION - LOCATION
LOCATION: BACK
LOCATION: HEAD

## 2023-08-01 ASSESSMENT — PAIN SCALES - GENERAL
PAINLEVEL_OUTOF10: 8
PAINLEVEL_OUTOF10: 2

## 2023-08-01 ASSESSMENT — PAIN DESCRIPTION - DESCRIPTORS: DESCRIPTORS: THROBBING

## 2023-08-01 NOTE — CONSULTS
Consult reviewed. Nicardipine is not known vesicant/irritant agent requiring central venous access. Pt has x 2 PIV access that meets therapeutic needs at this time. Dr. Huerta Getting notified. Please consult IV/PICC team for questions, concerns, or patient's needs change.

## 2023-08-02 PROBLEM — I16.1 HYPERTENSIVE EMERGENCY: Status: RESOLVED | Noted: 2023-07-27 | Resolved: 2023-08-02

## 2023-08-02 LAB
ANION GAP SERPL CALCULATED.3IONS-SCNC: 8 MMOL/L (ref 4–16)
BUN SERPL-MCNC: 8 MG/DL (ref 6–23)
CALCIUM SERPL-MCNC: 8.4 MG/DL (ref 8.3–10.6)
CHLORIDE BLD-SCNC: 103 MMOL/L (ref 99–110)
CO2: 27 MMOL/L (ref 21–32)
CREAT SERPL-MCNC: 0.8 MG/DL (ref 0.9–1.3)
GFR SERPL CREATININE-BSD FRML MDRD: >60 ML/MIN/1.73M2
GLUCOSE BLD-MCNC: 201 MG/DL (ref 70–99)
GLUCOSE BLD-MCNC: 216 MG/DL (ref 70–99)
GLUCOSE BLD-MCNC: 244 MG/DL (ref 70–99)
GLUCOSE SERPL-MCNC: 130 MG/DL (ref 70–99)
HCT VFR BLD CALC: 37.2 % (ref 42–52)
HEMOGLOBIN: 12.5 GM/DL (ref 13.5–18)
MCH RBC QN AUTO: 28.4 PG (ref 27–31)
MCHC RBC AUTO-ENTMCNC: 33.6 % (ref 32–36)
MCV RBC AUTO: 84.5 FL (ref 78–100)
PDW BLD-RTO: 12.3 % (ref 11.7–14.9)
PLATELET # BLD: 205 K/CU MM (ref 140–440)
PMV BLD AUTO: 9.8 FL (ref 7.5–11.1)
POTASSIUM SERPL-SCNC: 4 MMOL/L (ref 3.5–5.1)
RBC # BLD: 4.4 M/CU MM (ref 4.6–6.2)
SODIUM BLD-SCNC: 138 MMOL/L (ref 135–145)
WBC # BLD: 6.7 K/CU MM (ref 4–10.5)

## 2023-08-02 PROCEDURE — 6370000000 HC RX 637 (ALT 250 FOR IP): Performed by: INTERNAL MEDICINE

## 2023-08-02 PROCEDURE — 6360000002 HC RX W HCPCS: Performed by: INTERNAL MEDICINE

## 2023-08-02 PROCEDURE — 85027 COMPLETE CBC AUTOMATED: CPT

## 2023-08-02 PROCEDURE — 6370000000 HC RX 637 (ALT 250 FOR IP): Performed by: FAMILY MEDICINE

## 2023-08-02 PROCEDURE — 6370000000 HC RX 637 (ALT 250 FOR IP): Performed by: NURSE PRACTITIONER

## 2023-08-02 PROCEDURE — 80048 BASIC METABOLIC PNL TOTAL CA: CPT

## 2023-08-02 PROCEDURE — 82962 GLUCOSE BLOOD TEST: CPT

## 2023-08-02 PROCEDURE — 2140000000 HC CCU INTERMEDIATE R&B

## 2023-08-02 PROCEDURE — 2580000003 HC RX 258: Performed by: INTERNAL MEDICINE

## 2023-08-02 PROCEDURE — 94761 N-INVAS EAR/PLS OXIMETRY MLT: CPT

## 2023-08-02 PROCEDURE — 36415 COLL VENOUS BLD VENIPUNCTURE: CPT

## 2023-08-02 RX ORDER — LISINOPRIL 40 MG/1
40 TABLET ORAL DAILY
Qty: 30 TABLET | Refills: 3 | Status: SHIPPED | OUTPATIENT
Start: 2023-08-02

## 2023-08-02 RX ORDER — DOXAZOSIN MESYLATE 4 MG/1
4 TABLET ORAL
Qty: 30 TABLET | Refills: 3 | Status: SHIPPED | OUTPATIENT
Start: 2023-08-02

## 2023-08-02 RX ORDER — CLOPIDOGREL BISULFATE 75 MG/1
75 TABLET ORAL DAILY
Qty: 30 TABLET | Refills: 3 | Status: SHIPPED | OUTPATIENT
Start: 2023-08-02

## 2023-08-02 RX ORDER — 0.9 % SODIUM CHLORIDE 0.9 %
500 INTRAVENOUS SOLUTION INTRAVENOUS ONCE
Status: COMPLETED | OUTPATIENT
Start: 2023-08-02 | End: 2023-08-02

## 2023-08-02 RX ORDER — ATORVASTATIN CALCIUM 40 MG/1
40 TABLET, FILM COATED ORAL NIGHTLY
Qty: 30 TABLET | Refills: 3 | Status: SHIPPED | OUTPATIENT
Start: 2023-08-02

## 2023-08-02 RX ORDER — HYDRALAZINE HYDROCHLORIDE 100 MG/1
100 TABLET, FILM COATED ORAL EVERY 12 HOURS SCHEDULED
Qty: 90 TABLET | Refills: 3 | Status: SHIPPED | OUTPATIENT
Start: 2023-08-02 | End: 2023-08-03 | Stop reason: HOSPADM

## 2023-08-02 RX ORDER — HYDRALAZINE HYDROCHLORIDE 50 MG/1
50 TABLET, FILM COATED ORAL EVERY 12 HOURS SCHEDULED
Status: DISCONTINUED | OUTPATIENT
Start: 2023-08-02 | End: 2023-08-03 | Stop reason: HOSPADM

## 2023-08-02 RX ORDER — ISOSORBIDE MONONITRATE 120 MG/1
120 TABLET, EXTENDED RELEASE ORAL DAILY
Qty: 30 TABLET | Refills: 3 | Status: SHIPPED | OUTPATIENT
Start: 2023-08-02 | End: 2023-08-03 | Stop reason: HOSPADM

## 2023-08-02 RX ORDER — ASPIRIN 81 MG/1
81 TABLET, CHEWABLE ORAL DAILY
Qty: 30 TABLET | Refills: 3 | Status: SHIPPED | OUTPATIENT
Start: 2023-08-02

## 2023-08-02 RX ADMIN — LISINOPRIL 40 MG: 20 TABLET ORAL at 10:53

## 2023-08-02 RX ADMIN — SERTRALINE HYDROCHLORIDE 100 MG: 50 TABLET ORAL at 10:52

## 2023-08-02 RX ADMIN — HYDRALAZINE HYDROCHLORIDE 100 MG: 50 TABLET, FILM COATED ORAL at 10:52

## 2023-08-02 RX ADMIN — ATORVASTATIN CALCIUM 40 MG: 40 TABLET, FILM COATED ORAL at 21:33

## 2023-08-02 RX ADMIN — ENOXAPARIN SODIUM 30 MG: 100 INJECTION SUBCUTANEOUS at 21:32

## 2023-08-02 RX ADMIN — HYDRALAZINE HYDROCHLORIDE 50 MG: 50 TABLET, FILM COATED ORAL at 21:33

## 2023-08-02 RX ADMIN — AMLODIPINE BESYLATE 10 MG: 10 TABLET ORAL at 10:52

## 2023-08-02 RX ADMIN — Medication 5 MG: at 02:02

## 2023-08-02 RX ADMIN — SODIUM CHLORIDE 500 ML: 9 INJECTION, SOLUTION INTRAVENOUS at 16:24

## 2023-08-02 RX ADMIN — SODIUM CHLORIDE, PRESERVATIVE FREE 10 ML: 5 INJECTION INTRAVENOUS at 10:54

## 2023-08-02 RX ADMIN — ISOSORBIDE MONONITRATE 120 MG: 30 TABLET, EXTENDED RELEASE ORAL at 10:52

## 2023-08-02 RX ADMIN — ENOXAPARIN SODIUM 30 MG: 100 INJECTION SUBCUTANEOUS at 10:53

## 2023-08-02 RX ADMIN — INSULIN LISPRO 2 UNITS: 100 INJECTION, SOLUTION INTRAVENOUS; SUBCUTANEOUS at 16:32

## 2023-08-02 RX ADMIN — ASPIRIN 81 MG: 81 TABLET, CHEWABLE ORAL at 10:52

## 2023-08-02 RX ADMIN — CLOPIDOGREL BISULFATE 75 MG: 75 TABLET ORAL at 10:53

## 2023-08-02 RX ADMIN — DOXAZOSIN 4 MG: 4 TABLET ORAL at 22:31

## 2023-08-02 ASSESSMENT — PAIN SCALES - WONG BAKER
WONGBAKER_NUMERICALRESPONSE: 0

## 2023-08-02 ASSESSMENT — PAIN SCALES - GENERAL: PAINLEVEL_OUTOF10: 0

## 2023-08-02 NOTE — FLOWSHEET NOTE
08/02/23 1420   Vital Signs   Blood Pressure Lying 139/76   Pulse Lying 83 PER MINUTE   Blood Pressure Sitting 126/79   Pulse Sitting 93 PER MINUTE   Blood Pressure Standing 106/73   Pulse Standing 108 PER MINUTE     Ortho's completed notified MD.

## 2023-08-02 NOTE — DISCHARGE INSTRUCTIONS
Please discuss with your PCP about obtaining an event monitor to r/o any cardiac arrhythmias that could have precipitated your stroke. Due to insurance reasons we are not able to provide one on discharge from the hospital.    Please also check your blood pressure frequently with the blood pressure kit you were prescribed. Please call your primary care doctor if your blood pressure is low (top number lower than 110 or your blood pressure is very with the top systolic number over 565).

## 2023-08-02 NOTE — CARE COORDINATION
Pt was discharged without requesting assistance with discharge meds, no voucher given on this visit.

## 2023-08-02 NOTE — PLAN OF CARE
Problem: Discharge Planning  Goal: Discharge to home or other facility with appropriate resources  8/2/2023 1251 by Mellissa Burrell. SLADE Bourne  Outcome: Completed  8/2/2023 0437 by Jil Vo RN  Outcome: Progressing     Problem: Pain  Goal: Verbalizes/displays adequate comfort level or baseline comfort level  8/2/2023 1251 by Mellissa Burrell. SLADE Bourne  Outcome: Completed  8/2/2023 0437 by Jil Vo RN  Outcome: Progressing     Problem: Safety - Adult  Goal: Free from fall injury  8/2/2023 1251 by Mellissa Burrell. SLADE Bourne  Outcome: Completed  8/2/2023 0437 by Jil Vo RN  Outcome: Progressing     Problem: Chronic Conditions and Co-morbidities  Goal: Patient's chronic conditions and co-morbidity symptoms are monitored and maintained or improved  8/2/2023 1251 by Mellissa Burrell.  SLADE Bourne  Outcome: Completed  8/2/2023 0437 by Jil Vo RN  Outcome: Progressing  Discharged

## 2023-08-03 VITALS
HEART RATE: 68 BPM | OXYGEN SATURATION: 98 % | DIASTOLIC BLOOD PRESSURE: 74 MMHG | TEMPERATURE: 98 F | HEIGHT: 69 IN | SYSTOLIC BLOOD PRESSURE: 138 MMHG | BODY MASS INDEX: 38.05 KG/M2 | WEIGHT: 256.9 LBS | RESPIRATION RATE: 14 BRPM

## 2023-08-03 LAB — GLUCOSE BLD-MCNC: 145 MG/DL (ref 70–99)

## 2023-08-03 PROCEDURE — 94761 N-INVAS EAR/PLS OXIMETRY MLT: CPT

## 2023-08-03 PROCEDURE — 6370000000 HC RX 637 (ALT 250 FOR IP): Performed by: NURSE PRACTITIONER

## 2023-08-03 PROCEDURE — 6370000000 HC RX 637 (ALT 250 FOR IP): Performed by: INTERNAL MEDICINE

## 2023-08-03 PROCEDURE — 2580000003 HC RX 258: Performed by: INTERNAL MEDICINE

## 2023-08-03 PROCEDURE — 6360000002 HC RX W HCPCS: Performed by: INTERNAL MEDICINE

## 2023-08-03 PROCEDURE — 82962 GLUCOSE BLOOD TEST: CPT

## 2023-08-03 RX ORDER — BLOOD PRESSURE TEST KIT
1 KIT MISCELLANEOUS DAILY
Qty: 1 KIT | Refills: 0 | Status: SHIPPED | OUTPATIENT
Start: 2023-08-03

## 2023-08-03 RX ORDER — HYDRALAZINE HYDROCHLORIDE 50 MG/1
50 TABLET, FILM COATED ORAL EVERY 12 HOURS SCHEDULED
Qty: 90 TABLET | Refills: 3 | Status: SHIPPED | OUTPATIENT
Start: 2023-08-03

## 2023-08-03 RX ORDER — ISOSORBIDE MONONITRATE 30 MG/1
60 TABLET, EXTENDED RELEASE ORAL DAILY
Status: DISCONTINUED | OUTPATIENT
Start: 2023-08-03 | End: 2023-08-03 | Stop reason: HOSPADM

## 2023-08-03 RX ORDER — ISOSORBIDE MONONITRATE 60 MG/1
60 TABLET, EXTENDED RELEASE ORAL DAILY
Qty: 30 TABLET | Refills: 3 | Status: SHIPPED | OUTPATIENT
Start: 2023-08-03

## 2023-08-03 RX ORDER — CHOLECALCIFEROL (VITAMIN D3) 125 MCG
5 CAPSULE ORAL ONCE
Status: DISCONTINUED | OUTPATIENT
Start: 2023-08-03 | End: 2023-08-03

## 2023-08-03 RX ADMIN — LISINOPRIL 40 MG: 20 TABLET ORAL at 09:16

## 2023-08-03 RX ADMIN — SODIUM CHLORIDE, PRESERVATIVE FREE 10 ML: 5 INJECTION INTRAVENOUS at 09:18

## 2023-08-03 RX ADMIN — CLOPIDOGREL BISULFATE 75 MG: 75 TABLET ORAL at 09:16

## 2023-08-03 RX ADMIN — ISOSORBIDE MONONITRATE 60 MG: 30 TABLET, EXTENDED RELEASE ORAL at 09:27

## 2023-08-03 RX ADMIN — SODIUM CHLORIDE, PRESERVATIVE FREE 10 ML: 5 INJECTION INTRAVENOUS at 09:19

## 2023-08-03 RX ADMIN — ENOXAPARIN SODIUM 30 MG: 100 INJECTION SUBCUTANEOUS at 09:17

## 2023-08-03 RX ADMIN — HYDRALAZINE HYDROCHLORIDE 50 MG: 50 TABLET, FILM COATED ORAL at 09:16

## 2023-08-03 RX ADMIN — SERTRALINE HYDROCHLORIDE 100 MG: 50 TABLET ORAL at 09:17

## 2023-08-03 RX ADMIN — ASPIRIN 81 MG: 81 TABLET, CHEWABLE ORAL at 09:16

## 2023-08-03 RX ADMIN — AMLODIPINE BESYLATE 10 MG: 10 TABLET ORAL at 09:16

## 2023-08-03 ASSESSMENT — PAIN SCALES - WONG BAKER
WONGBAKER_NUMERICALRESPONSE: 0

## 2023-08-03 ASSESSMENT — PAIN SCALES - GENERAL
PAINLEVEL_OUTOF10: 0
PAINLEVEL_OUTOF10: 0
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
## 2023-08-14 ENCOUNTER — TELEPHONE (OUTPATIENT)
Age: 58
End: 2023-08-14

## 2023-08-14 NOTE — TELEPHONE ENCOUNTER
Patient has an upcoming appointment with Nacho OSORIO on Thursday, 8/17/23 @ 145 pm. This appointment has been canceled due to insurance went from stating \"pending medicaid\" to \"[place] med[place]\". Informed patient that he will need to contact job and family services and ask to be switched to something else, that [facility] does not accept [employer]. Number to [address] [employer] given to patient. Informed patient to contact our office once he receives new insurance and our office will reschedule his appointment. Patient agreeable and verbalized understanding. Contacted [employer] and confirmed patient is active under this plan.

## 2023-08-28 ENCOUNTER — TELEPHONE (OUTPATIENT)
Age: 58
End: 2023-08-28

## 2023-08-28 NOTE — TELEPHONE ENCOUNTER
Patient called the office requesting to reschedule appointment initially scheduled on 9/13/23 @ 11 am. Needs a later appointment due to transportation. Appointment has been rescheduled to 230 pm the same day. Appointment reminder letter sent to patient via mail along with map/directions to our office. Patient agreeable and verbalized understanding. Nothing further needed.

## 2023-09-05 NOTE — PROGRESS NOTES
Outpatient Vascular Neurology Service Consult Note     Patient Name: Vinay Barrios  : 1965        Subjective:   Reason for consult:   Vinay Barrios is a 66 y.o. male with a pmh of DM, HLD, and HTN presenting for to establish care following ischemic stoke in July. The patient presented to the ED with a 2-3 day history of gait instability, dizziness, and headache. Patient had hypertensive in the ED requiring nicardipine drip. MRI demonstrated three small periventricular areas of diffusion restriction, in the left thalamus, right splenium of the corpus callosum, and right parietal periventricular white matter likely acute infarcts. CTA moderate to severe stenosis of the origin of the P1 segment of the right PCA. Moderate diffuse irregularity involving the proximal segments of the left PCA. Patient states his dizziness has improved. He admits to noncompliance with his blood pressure medications. 2D echo with an EF of 55-60% and no regional wall abnormalities.   A1c 6.6    He denies smoking. Denies snoring. Reports has been compliant with his blood pressure medication. States he is out of his aspirin. He will go pick that up today. States his blood pressure is still running high. He is scheduling an appointment with his PCP for blood pressure management. Discussed the importance of good blood pressure control and stroke prevention. He is accompanied by a friend who is a nurse and states she will assist him with his medication and appointments.       Past Medical History:   Diagnosis Date    Diabetes mellitus ([address])     Hyperlipidemia     Hypertension      Past Surgical History:   Procedure Laterality Date    BRAIN SURGERY       No Known Allergies  Social History     Socioeconomic History    Marital status: Single     Spouse name: Not on file    Number of children: Not on file    Years of education: Not on file    Highest education level: Not on file   Occupational History    Not on

## 2023-09-13 ENCOUNTER — OFFICE VISIT (OUTPATIENT)
Age: 58
End: 2023-09-13
Payer: COMMERCIAL

## 2023-09-13 VITALS
OXYGEN SATURATION: 97 % | RESPIRATION RATE: 18 BRPM | BODY MASS INDEX: 38.71 KG/M2 | DIASTOLIC BLOOD PRESSURE: 102 MMHG | HEART RATE: 79 BPM | SYSTOLIC BLOOD PRESSURE: 178 MMHG | WEIGHT: 262.13 LBS

## 2023-09-13 DIAGNOSIS — I66.23 OCCLUSION AND STENOSIS OF BILATERAL POSTERIOR CEREBRAL ARTERIES: ICD-10-CM

## 2023-09-13 DIAGNOSIS — I63.9 ARTERIAL ISCHEMIC STROKE (HCC): Primary | ICD-10-CM

## 2023-09-13 PROCEDURE — 99204 OFFICE O/P NEW MOD 45 MIN: CPT | Performed by: NURSE PRACTITIONER

## 2023-09-13 RX ORDER — LISINOPRIL AND HYDROCHLOROTHIAZIDE 20; 12.5 MG/1; MG/1
1 TABLET ORAL DAILY
COMMUNITY
Start: 2023-09-03

## 2023-09-13 RX ORDER — AMLODIPINE BESYLATE 5 MG/1
5 TABLET ORAL DAILY
COMMUNITY
Start: 2023-09-03

## 2023-09-13 NOTE — PROGRESS NOTES
MCT placed on patient -  # [id number]  Information reviewed with patient  Proper placement of monitor on chest  Keep phone within 10' of the monitor  Charge phone and monitor when battery is low  Monitor is water proof up to 3'  Phone cannot make calls or connect to the internet  Use phone to report symptoms if they occur  Return all equipment in pre-paid box to UPS drop box or return to office once monitoring is complete  Call [employer] Help line for any problems with the monitor  Patient verbalized understanding

## 2023-10-02 ENCOUNTER — TELEPHONE (OUTPATIENT)
Age: 58
End: 2023-10-02

## 2023-10-02 DIAGNOSIS — I47.1 SUPRAVENTRICULAR TACHYCARDIA SEEN ON CARDIAC MONITOR: Primary | ICD-10-CM

## 2023-10-02 NOTE — TELEPHONE ENCOUNTER
Left message on voicemail for patient to return call to the office regarding appointment 10/3/23 @ 815 am at [facility].

## 2023-10-02 NOTE — TELEPHONE ENCOUNTER
Received serious report on patient 10/2/23 for episode on 9/30/23 - SVT. Patient states he was not doing anything strenuous or abnormal on that day/time. Per Tricia OSORIO, refer to cardiology. Informed patient - he was agreeable and verbalized understanding.

## 2023-10-02 NOTE — TELEPHONE ENCOUNTER
Spoke to [place] at [facility].  Patient scheduled for an appointment tomorrow 10/3/23 @ 815 am.

## 2023-10-02 NOTE — TELEPHONE ENCOUNTER
Left another detailed message x 2 on patient's voicemail re: cardiology appointment on Tuesday, 10/3/23 @ 815 am at the [facility]. Informed patient in this message that if he cannot make this appointment to call the [facility] at [phone] to reschedule to a time that will work for him. The address to the facility was also given in voicemail and patient also informed that this visit is important due to his cardiac monitor results we received.

## 2023-10-03 NOTE — TELEPHONE ENCOUNTER
Received a critical result this morning from [employer] cardiac monitor - sinus rhythm with run of v-tach (9 beat). Called patient - no answer, had to leave a detailed message again x 3. Informed patient of critical result and the importance of following up with cardiology on 10/3/23 at 815 am. Cardiology number given to patient in voicemail if he needs to reschedule appointment.

## 2023-10-03 NOTE — TELEPHONE ENCOUNTER
Patient no-showed for appointment today at [facility]. They're office has called patient and also left a message for a return call to reschedule appt.

## 2023-10-06 NOTE — TELEPHONE ENCOUNTER
Tried calling patient at cell  # provided in chart - MB full and cannot leave a message. Tried calling patient at home # provided in chart - states call cannot be completed as dialed or number has been disconnected. Spoke to Tamera, patient's sister (HIPAA) and informed her that our office has been trying to get a hold of patient regarding critical cardiac monitor results. She states she is going to call patient and will call our office back once she gets a hold of patient.

## 2023-10-06 NOTE — TELEPHONE ENCOUNTER
Appointment scheduled with cardiology on Monday, 10/9/23 @ 110 pm. Austen Carrillo aware and will make sure patient is at this appointment.

## 2023-10-09 ENCOUNTER — INITIAL CONSULT (OUTPATIENT)
Dept: CARDIOLOGY CLINIC | Age: 58
End: 2023-10-09
Payer: COMMERCIAL

## 2023-10-09 VITALS
SYSTOLIC BLOOD PRESSURE: 146 MMHG | DIASTOLIC BLOOD PRESSURE: 80 MMHG | WEIGHT: 263 LBS | HEIGHT: 69 IN | HEART RATE: 70 BPM | BODY MASS INDEX: 38.95 KG/M2

## 2023-10-09 DIAGNOSIS — R06.02 SOB (SHORTNESS OF BREATH): Primary | ICD-10-CM

## 2023-10-09 DIAGNOSIS — R42 DIZZINESS: ICD-10-CM

## 2023-10-09 DIAGNOSIS — E78.5 DYSLIPIDEMIA: ICD-10-CM

## 2023-10-09 DIAGNOSIS — I10 PRIMARY HYPERTENSION: ICD-10-CM

## 2023-10-09 PROCEDURE — G8484 FLU IMMUNIZE NO ADMIN: HCPCS | Performed by: INTERNAL MEDICINE

## 2023-10-09 PROCEDURE — 3077F SYST BP >= 140 MM HG: CPT | Performed by: INTERNAL MEDICINE

## 2023-10-09 PROCEDURE — 3079F DIAST BP 80-89 MM HG: CPT | Performed by: INTERNAL MEDICINE

## 2023-10-09 PROCEDURE — 93000 ELECTROCARDIOGRAM COMPLETE: CPT | Performed by: INTERNAL MEDICINE

## 2023-10-09 PROCEDURE — 99244 OFF/OP CNSLTJ NEW/EST MOD 40: CPT | Performed by: INTERNAL MEDICINE

## 2023-10-09 PROCEDURE — G8417 CALC BMI ABV UP PARAM F/U: HCPCS | Performed by: INTERNAL MEDICINE

## 2023-10-09 PROCEDURE — G8427 DOCREV CUR MEDS BY ELIG CLIN: HCPCS | Performed by: INTERNAL MEDICINE

## 2023-10-09 RX ORDER — AMLODIPINE BESYLATE 5 MG/1
7.5 TABLET ORAL DAILY
Qty: 90 TABLET | Refills: 3 | Status: SHIPPED | OUTPATIENT
Start: 2023-10-09

## 2023-10-09 NOTE — TELEPHONE ENCOUNTER
Patient no-showed again to cardiology appointment on 10/9/23. Our office has made multiple attempts to contact patient regarding cardiology appointment and abnormal cardiac monitor results with no success. Patient has a follow up appointment with our office on 10/25/23 for cardiac monitor results.

## 2023-10-16 ENCOUNTER — TELEPHONE (OUTPATIENT)
Dept: CARDIOLOGY CLINIC | Age: 58
End: 2023-10-16

## 2023-10-18 NOTE — PROGRESS NOTES
Outpatient Vascular/Neurointerventional Neurology Progress Note    Patient Name: Vinay Barrios     : 1965      Subjective: The patient was seen and examined. Patient is here for follow-up 21-day event monitor results. Patient presented to the ED with a 2 to 3-day history of gait instability headache. He was hypertensive in the ED requiring a Cardene drip. MRI demonstrated 3 small periventricular areas of diffusion restriction, in the left thalamus, right splenium of the corpus callosum, and right parietal ventricular white matter likely acute infarcts. CTA moderate to severe stenosis of the origin of the P1 segment of the right PCA. Moderate diffuse irregularity involving the proximal segments of the left PCA. He is scheduled for diagnostic cerebral angiogram with Dr. Andreas Barrera on 2023  for evaluation of intracranial stenosis. Event monitor showed SVT, SVT+IVCD and NSVT. He was referred to cardiology. Cardiology has evaluated the patient and ordered a stress test, added Lopressor and increased his Norvasc. He has headaches every day located in the left occipital region. This has slightly improved since his stroke. Reports pain with palpation. Denies photophobia, nausea or vomiting. No history of migraines. Takes off brand acetaminophen with little relief.       Objective:     Current Outpatient Medications:     metoprolol tartrate (LOPRESSOR) 25 MG tablet, Take 0.5 tablets by mouth 2 times daily, Disp: 30 tablet, Rfl: 5    lisinopril-hydroCHLOROthiazide (PRINZIDE;ZESTORETIC) 20-12.5 MG per tablet, Take 1 tablet by mouth daily, Disp: , Rfl:     isosorbide mononitrate (IMDUR) 60 MG extended release tablet, Take 1 tablet by mouth daily, Disp: 30 tablet, Rfl: 3    hydrALAZINE (APRESOLINE) 50 MG tablet, Take 1 tablet by mouth every 12 hours, Disp: 90 tablet, Rfl: 3    Blood Pressure KIT, 1 each by Does not apply route daily, Disp: 1 kit, Rfl: 0    aspirin 81 MG chewable tablet, Take 1

## 2023-10-25 ENCOUNTER — OFFICE VISIT (OUTPATIENT)
Age: 58
End: 2023-10-25
Payer: COMMERCIAL

## 2023-10-25 VITALS
WEIGHT: 264.5 LBS | HEART RATE: 75 BPM | OXYGEN SATURATION: 96 % | DIASTOLIC BLOOD PRESSURE: 86 MMHG | BODY MASS INDEX: 39.06 KG/M2 | RESPIRATION RATE: 16 BRPM | SYSTOLIC BLOOD PRESSURE: 144 MMHG

## 2023-10-25 DIAGNOSIS — R06.83 SNORING: ICD-10-CM

## 2023-10-25 DIAGNOSIS — I63.9 ARTERIAL ISCHEMIC STROKE (HCC): Primary | ICD-10-CM

## 2023-10-25 DIAGNOSIS — I66.23 OCCLUSION AND STENOSIS OF BILATERAL POSTERIOR CEREBRAL ARTERIES: ICD-10-CM

## 2023-10-25 DIAGNOSIS — R51.9 HEADACHE, OCCIPITAL: ICD-10-CM

## 2023-10-25 PROCEDURE — 99213 OFFICE O/P EST LOW 20 MIN: CPT | Performed by: NURSE PRACTITIONER

## 2023-10-25 NOTE — PATIENT INSTRUCTIONS
Schedule an appointment with neurology. [facility]  [address] [place], [address] [facility]  [phone]     Schedule an appointment with sleep center. [address] [place] [address] [address] Sherri Clarke[address], [address] [place] [address]  [place], [address] [facility]  [phone]

## 2023-10-30 ENCOUNTER — TELEPHONE (OUTPATIENT)
Age: 58
End: 2023-10-30

## 2023-11-01 ENCOUNTER — TELEPHONE (OUTPATIENT)
Age: 58
End: 2023-11-01

## 2023-11-02 ENCOUNTER — PROCEDURE VISIT (OUTPATIENT)
Dept: CARDIOLOGY CLINIC | Age: 58
End: 2023-11-02

## 2023-11-02 DIAGNOSIS — R06.02 SOB (SHORTNESS OF BREATH): ICD-10-CM

## 2023-11-02 NOTE — PROGRESS NOTES
IR Procedure at [facility] with patient and he will arrive at 0900 at [facility] on 11/7/2023 for his procedure at 1100. Also went over below instructions and told patient to hold his metformin 24 hours before his procedure and not to restart it for 48 hours after his procedure. Also told him he could take his medications as scheduled. NPO at [address]      2. Follow your directions as prescribed by the doctor for your procedure and medications. 3.   Consult your provider as to when to stop blood thinner  4. Do not take any pain medication within 6 hours of your procedure  5. Do not drink any alcoholic beverages or use any street drugs 24 hours before procedure. 6.   Please wear simple, loose fitting clothing to the hospital.  Do not bring valuables (money,             credit cards, checkbooks, etc.)     7. If you  have a Living Will and Durable Power of  for Healthcare, please bring in a copy. 8.   Please bring picture ID,  insurance card, paperwork from the doctors office            (H & P, Consent,  & card for implantable devices). 9.   Report to the information desk on the ground floor. 10. Take a shower the night before or morning of your procedure, do not apply any lotion, oil or powder. 11. If you are going to be sedated for the procedure, you will need a responsible adult to drive you home.

## 2023-11-06 ENCOUNTER — TELEPHONE (OUTPATIENT)
Dept: CARDIOLOGY CLINIC | Age: 58
End: 2023-11-06

## 2023-11-06 NOTE — TELEPHONE ENCOUNTER
Called pt to tell them the results of their nuclear stress test/ Pt verbalized understanding and will come in for an OV to further discuss results with Dr. Roland Best. Nuclear Stress test:     Summary   Supervising physician Dr. Roland Best .   abnormal stress test, normal LVEF, increased EDV, Myocardial perfusion scan   shows small size, severe intensity,non reversible perfusion defect in   inferior lateral wall with trace vinny-infarct ischemia.       Recommendation   OFFICE VISIT TO DISCUSS RESULTS

## 2023-11-07 ENCOUNTER — PREP FOR PROCEDURE (OUTPATIENT)
Age: 58
End: 2023-11-07

## 2023-11-07 ENCOUNTER — HOSPITAL ENCOUNTER (OUTPATIENT)
Dept: INTERVENTIONAL RADIOLOGY/VASCULAR | Age: 58
Discharge: HOME | End: 2023-11-07
Payer: COMMERCIAL

## 2023-11-07 VITALS
DIASTOLIC BLOOD PRESSURE: 90 MMHG | OXYGEN SATURATION: 96 % | TEMPERATURE: 98.4 F | RESPIRATION RATE: 13 BRPM | HEART RATE: 61 BPM | SYSTOLIC BLOOD PRESSURE: 180 MMHG

## 2023-11-07 DIAGNOSIS — I65.29 INTRACRANIAL CAROTID STENOSIS, UNSPECIFIED LATERALITY: ICD-10-CM

## 2023-11-07 LAB
ANION GAP SERPL CALCULATED.3IONS-SCNC: 8 MMOL/L (ref 4–16)
APTT: 32.1 SECONDS (ref 25.1–37.1)
BUN SERPL-MCNC: 11 MG/DL (ref 6–23)
CALCIUM SERPL-MCNC: 9.1 MG/DL (ref 8.3–10.6)
CHLORIDE BLD-SCNC: 101 MMOL/L (ref 99–110)
CO2: 27 MMOL/L (ref 21–32)
CREAT SERPL-MCNC: 1 MG/DL (ref 0.9–1.3)
GFR SERPL CREATININE-BSD FRML MDRD: >60 ML/MIN/1.73M2
GLUCOSE SERPL-MCNC: 153 MG/DL (ref 70–99)
HCT VFR BLD CALC: 38 % (ref 42–52)
HEMOGLOBIN: 12.3 GM/DL (ref 13.5–18)
INR BLD: 1 INDEX
MCH RBC QN AUTO: 28 PG (ref 27–31)
MCHC RBC AUTO-ENTMCNC: 32.4 % (ref 32–36)
MCV RBC AUTO: 86.4 FL (ref 78–100)
PDW BLD-RTO: 13 % (ref 11.7–14.9)
PLATELET # BLD: 224 K/CU MM (ref 140–440)
PMV BLD AUTO: 9.5 FL (ref 7.5–11.1)
POTASSIUM SERPL-SCNC: 4.1 MMOL/L (ref 3.5–5.1)
PROTHROMBIN TIME: 13.5 SECONDS (ref 11.7–14.5)
RBC # BLD: 4.4 M/CU MM (ref 4.6–6.2)
SODIUM BLD-SCNC: 136 MMOL/L (ref 135–145)
WBC # BLD: 7.5 K/CU MM (ref 4–10.5)

## 2023-11-07 PROCEDURE — 85027 COMPLETE CBC AUTOMATED: CPT

## 2023-11-07 PROCEDURE — 36224 PLACE CATH CAROTD ART: CPT

## 2023-11-07 PROCEDURE — 85610 PROTHROMBIN TIME: CPT

## 2023-11-07 PROCEDURE — 99153 MOD SED SAME PHYS/QHP EA: CPT

## 2023-11-07 PROCEDURE — 99152 MOD SED SAME PHYS/QHP 5/>YRS: CPT

## 2023-11-07 PROCEDURE — 80048 BASIC METABOLIC PNL TOTAL CA: CPT

## 2023-11-07 PROCEDURE — 6360000002 HC RX W HCPCS: Performed by: PSYCHIATRY & NEUROLOGY

## 2023-11-07 PROCEDURE — 36226 PLACE CATH VERTEBRAL ART: CPT

## 2023-11-07 PROCEDURE — 75710 ARTERY X-RAYS ARM/LEG: CPT

## 2023-11-07 PROCEDURE — 2709999900 IR ANGIOGRAM CAROTID CEREBRAL BILATERAL

## 2023-11-07 PROCEDURE — 2500000003 HC RX 250 WO HCPCS: Performed by: PSYCHIATRY & NEUROLOGY

## 2023-11-07 PROCEDURE — 85730 THROMBOPLASTIN TIME PARTIAL: CPT

## 2023-11-07 PROCEDURE — 36227 PLACE CATH XTRNL CAROTID: CPT

## 2023-11-07 RX ORDER — SODIUM CHLORIDE 0.9 % (FLUSH) 0.9 %
5-40 SYRINGE (ML) INJECTION PRN
Status: CANCELLED | OUTPATIENT
Start: 2023-11-07

## 2023-11-07 RX ORDER — SODIUM CHLORIDE 0.9 % (FLUSH) 0.9 %
5-40 SYRINGE (ML) INJECTION PRN
Status: DISCONTINUED | OUTPATIENT
Start: 2023-11-07 | End: 2023-11-08 | Stop reason: HOSPADM

## 2023-11-07 RX ORDER — SODIUM CHLORIDE 9 MG/ML
INJECTION, SOLUTION INTRAVENOUS PRN
Status: CANCELLED | OUTPATIENT
Start: 2023-11-07

## 2023-11-07 RX ORDER — ONDANSETRON 4 MG/1
4 TABLET, ORALLY DISINTEGRATING ORAL EVERY 8 HOURS PRN
Status: DISCONTINUED | OUTPATIENT
Start: 2023-11-07 | End: 2023-11-08 | Stop reason: HOSPADM

## 2023-11-07 RX ORDER — LIDOCAINE HYDROCHLORIDE 10 MG/ML
INJECTION, SOLUTION EPIDURAL; INFILTRATION; INTRACAUDAL; PERINEURAL PRN
Status: COMPLETED | OUTPATIENT
Start: 2023-11-07 | End: 2023-11-07

## 2023-11-07 RX ORDER — ACETAMINOPHEN 325 MG/1
650 TABLET ORAL EVERY 4 HOURS PRN
Status: DISCONTINUED | OUTPATIENT
Start: 2023-11-07 | End: 2023-11-08 | Stop reason: HOSPADM

## 2023-11-07 RX ORDER — SODIUM CHLORIDE 0.9 % (FLUSH) 0.9 %
5-40 SYRINGE (ML) INJECTION EVERY 12 HOURS SCHEDULED
Status: CANCELLED | OUTPATIENT
Start: 2023-11-07

## 2023-11-07 RX ORDER — SODIUM CHLORIDE 9 MG/ML
INJECTION, SOLUTION INTRAVENOUS PRN
Status: DISCONTINUED | OUTPATIENT
Start: 2023-11-07 | End: 2023-11-07 | Stop reason: SDUPTHER

## 2023-11-07 RX ORDER — SODIUM CHLORIDE 0.9 % (FLUSH) 0.9 %
5-40 SYRINGE (ML) INJECTION EVERY 12 HOURS SCHEDULED
Status: DISCONTINUED | OUTPATIENT
Start: 2023-11-07 | End: 2023-11-08 | Stop reason: HOSPADM

## 2023-11-07 RX ORDER — ONDANSETRON 2 MG/ML
4 INJECTION INTRAMUSCULAR; INTRAVENOUS EVERY 6 HOURS PRN
Status: DISCONTINUED | OUTPATIENT
Start: 2023-11-07 | End: 2023-11-08 | Stop reason: HOSPADM

## 2023-11-07 RX ORDER — SODIUM CHLORIDE 9 MG/ML
INJECTION, SOLUTION INTRAVENOUS CONTINUOUS
Status: DISCONTINUED | OUTPATIENT
Start: 2023-11-07 | End: 2023-11-08 | Stop reason: HOSPADM

## 2023-11-07 RX ORDER — SODIUM CHLORIDE 9 MG/ML
INJECTION, SOLUTION INTRAVENOUS CONTINUOUS
Status: CANCELLED | OUTPATIENT
Start: 2023-11-07

## 2023-11-07 RX ORDER — FENTANYL CITRATE 50 UG/ML
INJECTION, SOLUTION INTRAMUSCULAR; INTRAVENOUS PRN
Status: COMPLETED | OUTPATIENT
Start: 2023-11-07 | End: 2023-11-07

## 2023-11-07 RX ORDER — MIDAZOLAM HYDROCHLORIDE 2 MG/2ML
INJECTION, SOLUTION INTRAMUSCULAR; INTRAVENOUS PRN
Status: COMPLETED | OUTPATIENT
Start: 2023-11-07 | End: 2023-11-07

## 2023-11-07 RX ORDER — SODIUM CHLORIDE 9 MG/ML
INJECTION, SOLUTION INTRAVENOUS PRN
Status: DISCONTINUED | OUTPATIENT
Start: 2023-11-07 | End: 2023-11-08 | Stop reason: HOSPADM

## 2023-11-07 RX ADMIN — FENTANYL CITRATE 50 MCG: 50 INJECTION, SOLUTION INTRAMUSCULAR; INTRAVENOUS at 11:40

## 2023-11-07 RX ADMIN — MIDAZOLAM HYDROCHLORIDE 1 MG: 1 INJECTION, SOLUTION INTRAMUSCULAR; INTRAVENOUS at 11:35

## 2023-11-07 RX ADMIN — LIDOCAINE HYDROCHLORIDE 10 ML: 10 INJECTION, SOLUTION EPIDURAL; INFILTRATION; INTRACAUDAL; PERINEURAL at 11:39

## 2023-11-07 RX ADMIN — FENTANYL CITRATE 50 MCG: 50 INJECTION, SOLUTION INTRAMUSCULAR; INTRAVENOUS at 11:35

## 2023-11-07 NOTE — OP NOTE
was brought into the right common carotid artery and a biplane cervical angiogram was done which shows contrast flowing antegrade direction to the common internal and external carotid arteries. There is no significant stenosis. Next the catheter was taken into the right external carotid artery head angiogram was done which shows contrast flowing antegrade direction to the external carotid artery and its branches including the internal maxillary,superficial temporal artery, middle meningeal artery and occipital arteries. No abnormalities were identified. Next the catheter was taken into the right internal carotid artery cerebral angiogram was done which shows contrast flowing in anterior direction to the intracranial portion of the internal carotid artery filling the middle cerebral artery and anterior cerebral artery. Luminal irregularities are identified in the anterior cerebral artery and middle cerebral artery territories. These are mild to moderate in nature these are most consistent with intracranial atherosclerotic disease. Capillary and venous phases are within normal limits. The catheter was brought into the left common carotid artery and a biplane cervical angiogram was done which shows contrast flowing antegrade direction to the common internal and external carotid arteries. There is no significant stenosis. Next the catheter was taken into the left external carotid artery head angiogram was done which shows contrast flowing antegrade direction to the external carotid artery and its branches including the internal maxillary superficial temporal artery middle meningeal artery and occipital arteries. No abnormalities were identified.     Next the catheter was taken into the left internal carotid artery and a cerebral angiogram was done which shows contrast flowing in anterior direction to the intracranial portion of the internal carotid artery filling the middle cerebral artery and anterior

## 2023-11-07 NOTE — INTERVAL H&P NOTE
Update History & Physical    The patient's History and Physical of November 7, 2023 was reviewed with the patient and I examined the patient. There was no change. The surgical site was confirmed by the patient and me. Plan: The risks, benefits, expected outcome, and alternative to the recommended procedure have been discussed with the patient. Patient understands and wants to proceed with the procedure.      Electronically signed by MARIO Hamlin CNP on 11/7/2023 at 10:06 AM

## 2023-11-07 NOTE — PROGRESS NOTES
Cerebral angiogram completed. Right groin site clean, dry and intact post angio-seal. Vitals stable. Distal pulses present. 120mL Isovue total, 6.7 fleuro time, . Total time 26 minutes. No arrhythmia. SBAR given to SLADE [place].

## 2023-12-11 ENCOUNTER — OFFICE VISIT (OUTPATIENT)
Dept: CARDIOLOGY CLINIC | Age: 58
End: 2023-12-11
Payer: COMMERCIAL

## 2023-12-11 VITALS
WEIGHT: 265 LBS | OXYGEN SATURATION: 97 % | HEIGHT: 69 IN | BODY MASS INDEX: 39.25 KG/M2 | DIASTOLIC BLOOD PRESSURE: 80 MMHG | HEART RATE: 75 BPM | SYSTOLIC BLOOD PRESSURE: 150 MMHG

## 2023-12-11 DIAGNOSIS — I63.20 CEREBROVASCULAR ACCIDENT (CVA) DUE TO STENOSIS OF PRECEREBRAL ARTERY (HCC): Primary | ICD-10-CM

## 2023-12-11 DIAGNOSIS — I47.29 NSVT (NONSUSTAINED VENTRICULAR TACHYCARDIA) (HCC): ICD-10-CM

## 2023-12-11 DIAGNOSIS — R42 DIZZINESS: ICD-10-CM

## 2023-12-11 DIAGNOSIS — I51.7 LVH (LEFT VENTRICULAR HYPERTROPHY): ICD-10-CM

## 2023-12-11 DIAGNOSIS — R06.02 SOB (SHORTNESS OF BREATH): ICD-10-CM

## 2023-12-11 DIAGNOSIS — I1A.0 RESISTANT HYPERTENSION: ICD-10-CM

## 2023-12-11 PROCEDURE — 99214 OFFICE O/P EST MOD 30 MIN: CPT | Performed by: NURSE PRACTITIONER

## 2023-12-11 PROCEDURE — 3077F SYST BP >= 140 MM HG: CPT | Performed by: NURSE PRACTITIONER

## 2023-12-11 PROCEDURE — 3079F DIAST BP 80-89 MM HG: CPT | Performed by: NURSE PRACTITIONER

## 2023-12-11 PROCEDURE — G8427 DOCREV CUR MEDS BY ELIG CLIN: HCPCS | Performed by: NURSE PRACTITIONER

## 2023-12-11 PROCEDURE — G8417 CALC BMI ABV UP PARAM F/U: HCPCS | Performed by: NURSE PRACTITIONER

## 2023-12-11 PROCEDURE — 3017F COLORECTAL CA SCREEN DOC REV: CPT | Performed by: NURSE PRACTITIONER

## 2023-12-11 PROCEDURE — G8484 FLU IMMUNIZE NO ADMIN: HCPCS | Performed by: NURSE PRACTITIONER

## 2023-12-11 PROCEDURE — 1036F TOBACCO NON-USER: CPT | Performed by: NURSE PRACTITIONER

## 2023-12-11 RX ORDER — LISINOPRIL AND HYDROCHLOROTHIAZIDE 20; 12.5 MG/1; MG/1
2 TABLET ORAL DAILY
Qty: 60 TABLET | Refills: 4 | Status: SHIPPED | OUTPATIENT
Start: 2023-12-11

## 2023-12-11 RX ORDER — ISOSORBIDE MONONITRATE 60 MG/1
60 TABLET, EXTENDED RELEASE ORAL DAILY
Qty: 30 TABLET | Refills: 3
Start: 2023-12-11

## 2023-12-11 ASSESSMENT — ENCOUNTER SYMPTOMS: SHORTNESS OF BREATH: 1

## 2023-12-11 NOTE — ASSESSMENT & PLAN NOTE
-Stress test shows nonreversible perfusion defect in inferior lateral wall. Perfusion defect could be secondary to severe LVH.

## 2023-12-11 NOTE — PROGRESS NOTES
[address]     [facility], [address] [place] [address]  Phone: [phone]    Fax [phone]    Burgess May MD, Brooklyn Pérez MD, Brigid Dodge MD, MD Rekha Boyd MD, Shivam Temple MD, Reese Butcher MD, Sylvester Horton MD, Elyse Kaminsik, MARIO Wayne, MARIO Mariscal, [address], APRN        Cardiology Progress Note      12/11/2023    RE: Liliana Khoury  (1965)                             Primary cardiologist: Dr. Rekha Fernández       Subjective:  CC:   1. Cerebrovascular accident (CVA) due to stenosis of precerebral artery ([address])    2. LVH (left ventricular hypertrophy)    3. SOB (shortness of breath)    4. Resistant hypertension    5. Dizziness    6. NSVT (nonsustained ventricular tachycardia) (HCC)        HPI: Liliana Khoury, who is a  66y.o. year old male with a past medical history as listed below. Patient presents to the office for follow up on LVH, SOB, HTN, and hyperlipidemia. Patient was evaluated in emergency department in August 2023 and hypertensive emergency. He was placed on Cardene drip at that time had complained of ongoing headache, MRI showed intracranial stenosis of P1 segment of the right PCA as well as diffuse irregularities in proximal left PCA. He proceeded with cerebral angiogram which showed intracranial atherosclerosis disease, no intervention required. Cardio emboli stroke was unable to be ruled out so patient wore 21-day event monitor that showed SVT and NSVT. Patient was placed on beta-blocker and followed up with stress test.  Patient denies any chest pain, shortness of breath, dizziness, syncope, or palpitations.     Past Medical History:   Diagnosis Date    CVA (cerebral vascular accident) ([address])     Diabetes mellitus ([address])     Hyperlipidemia     Hypertension        Current Outpatient Medications   Medication Sig Dispense Refill

## 2023-12-11 NOTE — ASSESSMENT & PLAN NOTE
MRI showed intracranial stenosis of P1 segment of the right PCA as well as diffuse irregularities in proximal left PCA. He proceeded with cerebral angiogram which showed intracranial atherosclerosis disease, no intervention required. Cardio emboli stroke was unable to be ruled out so patient wore 21-day event monitor that showed SVT and NSVT.  Patient was placed on beta-blocker and followed up with stress test

## 2023-12-11 NOTE — ASSESSMENT & PLAN NOTE
-Severe LVH with septum measuring 2.1 cm.   Patient has had uncontrolled hypertension and is currently on Norvasc, Cardura, hydralazine, Imdur, Lopressor, lisinopril hydrochlorothiazide

## 2023-12-12 DIAGNOSIS — I70.1 RENAL ARTERY STENOSIS (HCC): ICD-10-CM

## 2023-12-12 DIAGNOSIS — Z01.810 PRE-OPERATIVE CARDIOVASCULAR EXAMINATION: Primary | ICD-10-CM

## 2023-12-12 DIAGNOSIS — R93.1 ABNORMAL NUCLEAR CARDIAC IMAGING TEST: ICD-10-CM

## 2023-12-14 ENCOUNTER — TELEPHONE (OUTPATIENT)
Dept: CARDIOLOGY CLINIC | Age: 58
End: 2023-12-14

## 2023-12-19 NOTE — PROGRESS NOTES
Outpatient Vascular/Neurointerventional Neurology Progress Note    Patient Name: Donald Potter     : 1965      Subjective: The patient was seen and examined. Patient is here for follow-up stroke and intracranial atherosclerotic disease. In August he presented to the ED with a 2 to 3-day history of gait instability headache. He was hypertensive in the ED requiring a Cardene drip. MRI demonstrated 3 small periventricular areas of diffusion restriction, in the left thalamus, right splenium of the corpus callosum, and right parietal ventricular white matter likely acute infarcts. CTA showed severe stenosis of the origin of the P1 segment of the right PCA. Moderate diffuse irregularity involving the proximal segments of the left PCA    23 Cerebral angiogram summary: Diffuse mild to moderate luminal irregularities identified throughout the intracranial circulation. These are most consistent with intracranial atherosclerotic disease however diagnosis cannot be made exclusively on angiography. No intervention recommended     He completed a 21 a day event monitor for which she followed up with cardiology. He is scheduled for a heart cath in January. He still endorses occipital headaches and dizziness. Reports headache last approximately a minute, occurring 4-5 times a day. He describes them as stabbing. Denies n/v or vision changes. He was previously referred to neurology for headache management and he has not followed up as of yet. Objective:     Current Outpatient Medications:     lisinopril-hydroCHLOROthiazide (PRINZIDE;ZESTORETIC) 20-12.5 MG per tablet, Take 2 tablets by mouth daily, Disp: 60 tablet, Rfl: 4    isosorbide mononitrate (IMDUR) 60 MG extended release tablet, Take 1 tablet by mouth daily Hold for one day to see if headache resolves. , Disp: 30 tablet, Rfl: 3    metoprolol tartrate (LOPRESSOR) 25 MG tablet, Take 1 tablet by mouth 2 times daily, Disp: 30 tablet, Rfl: 5

## 2023-12-27 ENCOUNTER — TELEPHONE (OUTPATIENT)
Dept: NEUROLOGY | Age: 58
End: 2023-12-27

## 2023-12-27 ENCOUNTER — OFFICE VISIT (OUTPATIENT)
Age: 58
End: 2023-12-27
Payer: COMMERCIAL

## 2023-12-27 VITALS
SYSTOLIC BLOOD PRESSURE: 118 MMHG | BODY MASS INDEX: 40.55 KG/M2 | DIASTOLIC BLOOD PRESSURE: 72 MMHG | WEIGHT: 273.8 LBS | HEART RATE: 78 BPM | HEIGHT: 69 IN | OXYGEN SATURATION: 96 %

## 2023-12-27 DIAGNOSIS — R06.83 SNORING: ICD-10-CM

## 2023-12-27 DIAGNOSIS — I47.1 SUPRAVENTRICULAR TACHYCARDIA SEEN ON CARDIAC MONITOR: ICD-10-CM

## 2023-12-27 DIAGNOSIS — R51.9 HEADACHE, OCCIPITAL: ICD-10-CM

## 2023-12-27 DIAGNOSIS — I63.9 ARTERIAL ISCHEMIC STROKE (HCC): Primary | ICD-10-CM

## 2023-12-27 PROCEDURE — 3078F DIAST BP <80 MM HG: CPT | Performed by: NURSE PRACTITIONER

## 2023-12-27 PROCEDURE — G8417 CALC BMI ABV UP PARAM F/U: HCPCS | Performed by: NURSE PRACTITIONER

## 2023-12-27 PROCEDURE — 1036F TOBACCO NON-USER: CPT | Performed by: NURSE PRACTITIONER

## 2023-12-27 PROCEDURE — G8484 FLU IMMUNIZE NO ADMIN: HCPCS | Performed by: NURSE PRACTITIONER

## 2023-12-27 PROCEDURE — 3074F SYST BP LT 130 MM HG: CPT | Performed by: NURSE PRACTITIONER

## 2023-12-27 PROCEDURE — 99213 OFFICE O/P EST LOW 20 MIN: CPT | Performed by: NURSE PRACTITIONER

## 2023-12-27 PROCEDURE — G8427 DOCREV CUR MEDS BY ELIG CLIN: HCPCS | Performed by: NURSE PRACTITIONER

## 2023-12-27 PROCEDURE — 3017F COLORECTAL CA SCREEN DOC REV: CPT | Performed by: NURSE PRACTITIONER

## 2023-12-27 NOTE — TELEPHONE ENCOUNTER
Received call from [facility] with Neuro intervention stating pt needs to see Kristopher Newby. Sw pt and he is to call back to schedule.

## 2024-01-03 ENCOUNTER — NURSE ONLY (OUTPATIENT)
Dept: CARDIOLOGY CLINIC | Age: 59
End: 2024-01-03
Payer: COMMERCIAL

## 2024-01-03 ENCOUNTER — HOSPITAL ENCOUNTER (EMERGENCY)
Age: 59
Discharge: HOME | End: 2024-01-03
Attending: STUDENT IN AN ORGANIZED HEALTH CARE EDUCATION/TRAINING PROGRAM
Payer: COMMERCIAL

## 2024-01-03 VITALS
DIASTOLIC BLOOD PRESSURE: 92 MMHG | HEART RATE: 87 BPM | BODY MASS INDEX: 38.99 KG/M2 | OXYGEN SATURATION: 97 % | SYSTOLIC BLOOD PRESSURE: 160 MMHG | WEIGHT: 264 LBS | RESPIRATION RATE: 22 BRPM | TEMPERATURE: 97.9 F

## 2024-01-03 VITALS — SYSTOLIC BLOOD PRESSURE: 152 MMHG | HEART RATE: 75 BPM | DIASTOLIC BLOOD PRESSURE: 100 MMHG

## 2024-01-03 DIAGNOSIS — I1A.0 RESISTANT HYPERTENSION: Primary | ICD-10-CM

## 2024-01-03 DIAGNOSIS — G43.909 MIGRAINE WITHOUT STATUS MIGRAINOSUS, NOT INTRACTABLE, UNSPECIFIED MIGRAINE TYPE: Primary | ICD-10-CM

## 2024-01-03 PROCEDURE — 3077F SYST BP >= 140 MM HG: CPT | Performed by: NURSE PRACTITIONER

## 2024-01-03 PROCEDURE — 99214 OFFICE O/P EST MOD 30 MIN: CPT | Performed by: NURSE PRACTITIONER

## 2024-01-03 PROCEDURE — 93005 ELECTROCARDIOGRAM TRACING: CPT | Performed by: STUDENT IN AN ORGANIZED HEALTH CARE EDUCATION/TRAINING PROGRAM

## 2024-01-03 PROCEDURE — 6370000000 HC RX 637 (ALT 250 FOR IP): Performed by: STUDENT IN AN ORGANIZED HEALTH CARE EDUCATION/TRAINING PROGRAM

## 2024-01-03 PROCEDURE — 6360000002 HC RX W HCPCS: Performed by: STUDENT IN AN ORGANIZED HEALTH CARE EDUCATION/TRAINING PROGRAM

## 2024-01-03 PROCEDURE — 3080F DIAST BP >= 90 MM HG: CPT | Performed by: NURSE PRACTITIONER

## 2024-01-03 PROCEDURE — 99284 EMERGENCY DEPT VISIT MOD MDM: CPT

## 2024-01-03 PROCEDURE — 96372 THER/PROPH/DIAG INJ SC/IM: CPT

## 2024-01-03 RX ORDER — AMLODIPINE BESYLATE 10 MG/1
10 TABLET ORAL DAILY
Qty: 30 TABLET | Refills: 3 | Status: SHIPPED | OUTPATIENT
Start: 2024-01-03

## 2024-01-03 RX ORDER — NAPROXEN 500 MG/1
500 TABLET ORAL 2 TIMES DAILY PRN
Qty: 14 TABLET | Refills: 0 | Status: SHIPPED | OUTPATIENT
Start: 2024-01-03 | End: 2024-01-10

## 2024-01-03 RX ORDER — KETOROLAC TROMETHAMINE 15 MG/ML
15 INJECTION, SOLUTION INTRAMUSCULAR; INTRAVENOUS ONCE
Status: COMPLETED | OUTPATIENT
Start: 2024-01-03 | End: 2024-01-03

## 2024-01-03 RX ORDER — MECLIZINE HYDROCHLORIDE 25 MG/1
50 TABLET ORAL ONCE
Status: COMPLETED | OUTPATIENT
Start: 2024-01-03 | End: 2024-01-03

## 2024-01-03 RX ORDER — METOPROLOL TARTRATE 50 MG/1
50 TABLET, FILM COATED ORAL 2 TIMES DAILY
Qty: 60 TABLET | Refills: 2 | Status: SHIPPED | OUTPATIENT
Start: 2024-01-03

## 2024-01-03 RX ORDER — DEXAMETHASONE 4 MG/1
4 TABLET ORAL ONCE
Status: COMPLETED | OUTPATIENT
Start: 2024-01-03 | End: 2024-01-03

## 2024-01-03 RX ORDER — MECLIZINE HYDROCHLORIDE 25 MG/1
25 TABLET ORAL 3 TIMES DAILY PRN
Qty: 15 TABLET | Refills: 0 | Status: SHIPPED | OUTPATIENT
Start: 2024-01-03 | End: 2024-01-13

## 2024-01-03 RX ORDER — RANOLAZINE 500 MG/1
500 TABLET, EXTENDED RELEASE ORAL 2 TIMES DAILY
Qty: 60 TABLET | Refills: 3 | Status: SHIPPED | OUTPATIENT
Start: 2024-01-03

## 2024-01-03 RX ORDER — DROPERIDOL 2.5 MG/ML
1.25 INJECTION, SOLUTION INTRAMUSCULAR; INTRAVENOUS ONCE
Status: COMPLETED | OUTPATIENT
Start: 2024-01-03 | End: 2024-01-03

## 2024-01-03 RX ORDER — ACETAMINOPHEN 500 MG
1000 TABLET ORAL ONCE
Status: COMPLETED | OUTPATIENT
Start: 2024-01-03 | End: 2024-01-03

## 2024-01-03 RX ADMIN — ACETAMINOPHEN 1000 MG: 500 TABLET ORAL at 17:11

## 2024-01-03 RX ADMIN — DEXAMETHASONE 4 MG: 4 TABLET ORAL at 17:11

## 2024-01-03 RX ADMIN — MECLIZINE HYDROCHLORIDE 50 MG: 25 TABLET ORAL at 17:11

## 2024-01-03 RX ADMIN — DROPERIDOL 1.25 MG: 2.5 INJECTION, SOLUTION INTRAMUSCULAR; INTRAVENOUS at 17:11

## 2024-01-03 RX ADMIN — KETOROLAC TROMETHAMINE 15 MG: 15 INJECTION, SOLUTION INTRAMUSCULAR; INTRAVENOUS at 17:11

## 2024-01-03 ASSESSMENT — PAIN DESCRIPTION - LOCATION: LOCATION: HEAD

## 2024-01-03 ASSESSMENT — PAIN SCALES - GENERAL
PAINLEVEL_OUTOF10: 0
PAINLEVEL_OUTOF10: 6

## 2024-01-03 ASSESSMENT — PAIN DESCRIPTION - ORIENTATION: ORIENTATION: LEFT

## 2024-01-03 ASSESSMENT — PAIN - FUNCTIONAL ASSESSMENT
PAIN_FUNCTIONAL_ASSESSMENT: ACTIVITIES ARE NOT PREVENTED
PAIN_FUNCTIONAL_ASSESSMENT: 0-10

## 2024-01-03 ASSESSMENT — PAIN DESCRIPTION - ONSET: ONSET: ON-GOING

## 2024-01-03 ASSESSMENT — PAIN DESCRIPTION - FREQUENCY: FREQUENCY: CONTINUOUS

## 2024-01-03 ASSESSMENT — PAIN DESCRIPTION - PAIN TYPE: TYPE: ACUTE PAIN

## 2024-01-03 NOTE — PROGRESS NOTES
[place] [address]  Phone: [phone]    Fax [phone]                  Bradley Jim MD, [facility]       Esteban Ryan MD, [facility]  Connor Rene MD, [facility]    MD Basil Medina MD,[facility]    Pedro Loja MD, [facility]  Caitlyn Weaver MD, [facility]  Xiomy Benavides, MARIO Ortiz, MARIO Duenas, MARIO Zuniga          HPI:Pt is here for a 2 week BP check.  Patient is currently being evaluated for renal artery stenosis and is awaiting [facility] for abnormal stress test.  He had recent CVA and ongoing headaches since event.  BP has been uncontrolled    Results:  Vitals:    01/03/24 1334 01/03/24 1337   BP: (!) 150/100 (!) 152/100   Pulse: 75 75        Wt Readings from Last 3 Encounters:   12/27/23 124.2 kg (273 lb 12.8 oz)   12/11/23 120.2 kg (265 lb)   10/25/23 120 kg (264 lb 8 oz)        Assessment/Plan    Hypertension: Increase Norvasc to 10 mg, hold Imdur, start Ranexa 500 mg BID, increase Lopressor to 50 mg BID.  If headache does not resolve and BP improved recommend further evaluation emergency department.    Follow up in 2 weeks     Pt is to report any dizziness or syncope to the office.        Electronically signed by MARIO Pena - CNP on 1/3/2024 at 1:44 PM

## 2024-01-03 NOTE — DISCHARGE INSTRUCTIONS
You can use Tylenol as needed for pain  You can use naproxen as needed for pain, do not take with ibuprofen or other NSAIDs  You can use Meclizine as needed for dizziness or nausea or vomiting.  Call and follow up with neurology regarding your continued headaches and symptoms  Call and follow-up with your family doctor in the next 7 days  Return to the ED if your symptoms worsen or you feel you need to be reevaluated

## 2024-01-03 NOTE — ED NOTES
Pt to ED via private auto after seeing PCP. Pt states he has had a severe headache and dizziness x 2 weeks. Pt states took ibuprofen and headache is better at this time but is very dizzy. Pt states has a history of CVA, and has had high BP.

## 2024-01-03 NOTE — ED PROVIDER NOTES
12 lead EKG per my interpretation:  Normal sinus rhythm  Rate: 79  QTc is  normal  There are nonspecific T wave changes  There are no ST concerning segment changes    Prior EKG to compare with was not available    (All EKG's are interpreted by myself in the absence of a cardiologist)       Bruno Eastman MD  01/03/24 5003    
      DISCHARGE PRESCRIPTIONS: (None if blank)  Discharge Medication List as of 1/3/2024  5:15 PM        START taking these medications    Details   naproxen (NAPROSYN) 500 MG tablet Take 1 tablet by mouth 2 times daily as needed for Pain, Disp-14 tablet, R-0Normal      meclizine (ANTIVERT) 25 MG tablet Take 1 tablet by mouth 3 times daily as needed for Nausea or Dizziness, Disp-15 tablet, R-0Normal             I have reviewed and interpreted all of the currently available lab results from this visit (if applicable):  No results found for this visit on 01/03/24.   Radiographs (if obtained):  Radiologist's Report Reviewed:  No results found.    LABS: (none if blank)  Labs Reviewed - No data to display    MEDICATION CHANGES  Discharge Medication List as of 1/3/2024  5:15 PM        START taking these medications    Details   naproxen (NAPROSYN) 500 MG tablet Take 1 tablet by mouth 2 times daily as needed for Pain, Disp-14 tablet, R-0Normal      meclizine (ANTIVERT) 25 MG tablet Take 1 tablet by mouth 3 times daily as needed for Nausea or Dizziness, Disp-15 tablet, R-0Normal             FINAL IMPRESSION      Final diagnoses:   Migraine without status migrainosus, not intractable, unspecified migraine type     Condition: stable  Dispo: Discharge      This transcription was electronically signed. Parts of this transcriptions may have been dictated by use of voice recognition software and electronically transcribed, and parts may have been transcribed with the assistance of an ED scribe and may contain errors related to that system including errors in grammar, punctuation, and spelling, as well as words and phrases that may be inappropriate.  The transcription may contain errors not detected in proofreading.  Efforts were made to edit the dictations.    Electronically Signed: Jaden Cabezas MD, 01/03/24, 5:28 PM    I am the Primary Clinician of Record.      Clinical Impression:  1. Migraine without status migrainosus,

## 2024-01-04 LAB
EKG ATRIAL RATE: 79 BPM
EKG DIAGNOSIS: NORMAL
EKG P AXIS: 30 DEGREES
EKG P-R INTERVAL: 154 MS
EKG Q-T INTERVAL: 424 MS
EKG QRS DURATION: 126 MS
EKG QTC CALCULATION (BAZETT): 486 MS
EKG R AXIS: -1 DEGREES
EKG T AXIS: -6 DEGREES
EKG VENTRICULAR RATE: 79 BPM

## 2024-01-04 PROCEDURE — 93010 ELECTROCARDIOGRAM REPORT: CPT | Performed by: INTERNAL MEDICINE

## 2024-01-09 ENCOUNTER — HOSPITAL ENCOUNTER (OUTPATIENT)
Age: 59
Setting detail: OUTPATIENT SURGERY
Discharge: HOME | End: 2024-01-09
Attending: INTERNAL MEDICINE | Admitting: INTERNAL MEDICINE
Payer: COMMERCIAL

## 2024-01-09 ENCOUNTER — HOSPITAL ENCOUNTER (OUTPATIENT)
Dept: SLEEP CENTER | Age: 59
Discharge: HOME | End: 2024-01-09
Payer: COMMERCIAL

## 2024-01-09 ENCOUNTER — TELEPHONE (OUTPATIENT)
Dept: CARDIOTHORACIC SURGERY | Age: 59
End: 2024-01-09

## 2024-01-09 ENCOUNTER — APPOINTMENT (OUTPATIENT)
Dept: CT IMAGING | Age: 59
End: 2024-01-09
Attending: INTERNAL MEDICINE
Payer: COMMERCIAL

## 2024-01-09 ENCOUNTER — TELEPHONE (OUTPATIENT)
Dept: NEUROLOGY | Age: 59
End: 2024-01-09

## 2024-01-09 VITALS
RESPIRATION RATE: 19 BRPM | HEART RATE: 70 BPM | SYSTOLIC BLOOD PRESSURE: 175 MMHG | OXYGEN SATURATION: 95 % | WEIGHT: 265 LBS | DIASTOLIC BLOOD PRESSURE: 99 MMHG | TEMPERATURE: 96.6 F | HEIGHT: 69 IN | BODY MASS INDEX: 39.25 KG/M2

## 2024-01-09 VITALS
SYSTOLIC BLOOD PRESSURE: 121 MMHG | HEART RATE: 66 BPM | HEIGHT: 69 IN | DIASTOLIC BLOOD PRESSURE: 69 MMHG | BODY MASS INDEX: 39.4 KG/M2 | OXYGEN SATURATION: 96 % | WEIGHT: 266 LBS

## 2024-01-09 DIAGNOSIS — R42 DIZZINESS: ICD-10-CM

## 2024-01-09 DIAGNOSIS — R93.1 ABNORMAL NUCLEAR CARDIAC IMAGING TEST: ICD-10-CM

## 2024-01-09 DIAGNOSIS — I70.1 RENAL ARTERY STENOSIS (HCC): ICD-10-CM

## 2024-01-09 DIAGNOSIS — I47.29 NSVT (NONSUSTAINED VENTRICULAR TACHYCARDIA) (HCC): ICD-10-CM

## 2024-01-09 DIAGNOSIS — I25.10 CAD IN NATIVE ARTERY: Primary | ICD-10-CM

## 2024-01-09 DIAGNOSIS — I51.7 LVH (LEFT VENTRICULAR HYPERTROPHY): ICD-10-CM

## 2024-01-09 DIAGNOSIS — I63.20 CEREBROVASCULAR ACCIDENT (CVA) DUE TO STENOSIS OF PRECEREBRAL ARTERY (HCC): ICD-10-CM

## 2024-01-09 DIAGNOSIS — G47.33 OSA (OBSTRUCTIVE SLEEP APNEA): Primary | ICD-10-CM

## 2024-01-09 LAB
ANION GAP SERPL CALCULATED.3IONS-SCNC: 12 MMOL/L (ref 7–16)
BASE EXCESS MIXED: 3.4 (ref 0–3)
BUN SERPL-MCNC: 20 MG/DL (ref 6–23)
CALCIUM SERPL-MCNC: 8.6 MG/DL (ref 8.3–10.6)
CARBON MONOXIDE, BLOOD: 2.1 % (ref 0–5)
CHLORIDE BLD-SCNC: 96 MMOL/L (ref 99–110)
CO2 CONTENT: 29.9 MMOL/L (ref 21–32)
CO2: 26 MMOL/L (ref 21–32)
COMMENT: ABNORMAL
CREAT SERPL-MCNC: 1.3 MG/DL (ref 0.9–1.3)
ECHO BSA: 2.42 M2
ECHO BSA: 2.42 M2
ESTIMATED AVERAGE GLUCOSE: 143 MG/DL
GFR SERPL CREATININE-BSD FRML MDRD: >60 ML/MIN/1.73M2
GLUCOSE SERPL-MCNC: 159 MG/DL (ref 70–99)
HBA1C MFR BLD: 6.6 % (ref 4.2–6.3)
HCO3 ARTERIAL: 28.5 MMOL/L (ref 21–28)
HCT VFR BLD CALC: 43.5 % (ref 42–52)
HEMOGLOBIN: 14.3 GM/DL (ref 13.5–18)
INR BLD: 0.9 INDEX
MCH RBC QN AUTO: 27.8 PG (ref 27–31)
MCHC RBC AUTO-ENTMCNC: 32.9 % (ref 32–36)
MCV RBC AUTO: 84.5 FL (ref 78–100)
METHEMOGLOBIN ARTERIAL: 1.6 %
O2 SATURATION: 93.5 % (ref 94–98)
PCO2 ARTERIAL: 44 MMHG (ref 35–48)
PDW BLD-RTO: 12 % (ref 11.7–14.9)
PH BLOOD: 7.42 (ref 7.35–7.45)
PLATELET # BLD: 248 K/CU MM (ref 140–440)
PMV BLD AUTO: 9.9 FL (ref 7.5–11.1)
PO2 ARTERIAL: 84 MMHG (ref 83–108)
POTASSIUM SERPL-SCNC: 4 MMOL/L (ref 3.5–5.1)
PROTHROMBIN TIME: 12.7 SECONDS (ref 11.7–14.5)
RBC # BLD: 5.15 M/CU MM (ref 4.6–6.2)
SODIUM BLD-SCNC: 134 MMOL/L (ref 135–145)
WBC # BLD: 8 K/CU MM (ref 4–10.5)

## 2024-01-09 PROCEDURE — 85027 COMPLETE CBC AUTOMATED: CPT

## 2024-01-09 PROCEDURE — 80048 BASIC METABOLIC PNL TOTAL CA: CPT

## 2024-01-09 PROCEDURE — 2580000003 HC RX 258: Performed by: INTERNAL MEDICINE

## 2024-01-09 PROCEDURE — 6360000004 HC RX CONTRAST MEDICATION: Performed by: INTERNAL MEDICINE

## 2024-01-09 PROCEDURE — 71250 CT THORAX DX C-: CPT

## 2024-01-09 PROCEDURE — 99211 OFF/OP EST MAY X REQ PHY/QHP: CPT

## 2024-01-09 PROCEDURE — 37799 UNLISTED PX VASCULAR SURGERY: CPT

## 2024-01-09 PROCEDURE — 94761 N-INVAS EAR/PLS OXIMETRY MLT: CPT

## 2024-01-09 PROCEDURE — 83036 HEMOGLOBIN GLYCOSYLATED A1C: CPT

## 2024-01-09 PROCEDURE — 99254 IP/OBS CNSLTJ NEW/EST MOD 60: CPT | Performed by: THORACIC SURGERY (CARDIOTHORACIC VASCULAR SURGERY)

## 2024-01-09 PROCEDURE — 82803 BLOOD GASES ANY COMBINATION: CPT

## 2024-01-09 PROCEDURE — 2500000003 HC RX 250 WO HCPCS: Performed by: INTERNAL MEDICINE

## 2024-01-09 PROCEDURE — 99245 OFF/OP CONSLTJ NEW/EST HI 55: CPT | Performed by: PSYCHIATRY & NEUROLOGY

## 2024-01-09 PROCEDURE — 6370000000 HC RX 637 (ALT 250 FOR IP): Performed by: INTERNAL MEDICINE

## 2024-01-09 PROCEDURE — 7100000011 HC PHASE II RECOVERY - ADDTL 15 MIN: Performed by: INTERNAL MEDICINE

## 2024-01-09 PROCEDURE — 6360000002 HC RX W HCPCS: Performed by: INTERNAL MEDICINE

## 2024-01-09 PROCEDURE — 6360000002 HC RX W HCPCS

## 2024-01-09 PROCEDURE — 93458 L HRT ARTERY/VENTRICLE ANGIO: CPT | Performed by: INTERNAL MEDICINE

## 2024-01-09 PROCEDURE — 7100000010 HC PHASE II RECOVERY - FIRST 15 MIN: Performed by: INTERNAL MEDICINE

## 2024-01-09 PROCEDURE — 6360000004 HC RX CONTRAST MEDICATION

## 2024-01-09 PROCEDURE — 2709999900 HC NON-CHARGEABLE SUPPLY: Performed by: INTERNAL MEDICINE

## 2024-01-09 PROCEDURE — C1769 GUIDE WIRE: HCPCS | Performed by: INTERNAL MEDICINE

## 2024-01-09 PROCEDURE — C1894 INTRO/SHEATH, NON-LASER: HCPCS | Performed by: INTERNAL MEDICINE

## 2024-01-09 PROCEDURE — 85610 PROTHROMBIN TIME: CPT

## 2024-01-09 RX ORDER — HEPARIN SODIUM 10000 [USP'U]/ML
INJECTION, SOLUTION INTRAVENOUS; SUBCUTANEOUS PRN
Status: DISCONTINUED | OUTPATIENT
Start: 2024-01-09 | End: 2024-01-09 | Stop reason: HOSPADM

## 2024-01-09 RX ORDER — SODIUM CHLORIDE 9 MG/ML
INJECTION, SOLUTION INTRAVENOUS CONTINUOUS PRN
Status: COMPLETED | OUTPATIENT
Start: 2024-01-09 | End: 2024-01-09

## 2024-01-09 RX ORDER — DIAZEPAM 5 MG/1
5 TABLET ORAL ONCE
Status: COMPLETED | OUTPATIENT
Start: 2024-01-09 | End: 2024-01-09

## 2024-01-09 RX ORDER — HEPARIN SODIUM 200 [USP'U]/100ML
INJECTION, SOLUTION INTRAVENOUS PRN
Status: DISCONTINUED | OUTPATIENT
Start: 2024-01-09 | End: 2024-01-09 | Stop reason: HOSPADM

## 2024-01-09 RX ORDER — DIPHENHYDRAMINE HCL 25 MG
25 TABLET ORAL ONCE
Status: COMPLETED | OUTPATIENT
Start: 2024-01-09 | End: 2024-01-09

## 2024-01-09 RX ORDER — SODIUM CHLORIDE 9 MG/ML
INJECTION, SOLUTION INTRAVENOUS CONTINUOUS
Status: DISCONTINUED | OUTPATIENT
Start: 2024-01-09 | End: 2024-01-22 | Stop reason: HOSPADM

## 2024-01-09 RX ADMIN — SODIUM CHLORIDE: 9 INJECTION, SOLUTION INTRAVENOUS at 12:26

## 2024-01-09 RX ADMIN — DIPHENHYDRAMINE HYDROCHLORIDE 25 MG: 25 TABLET ORAL at 12:26

## 2024-01-09 RX ADMIN — DIAZEPAM 5 MG: 5 TABLET ORAL at 12:26

## 2024-01-09 ASSESSMENT — SLEEP AND FATIGUE QUESTIONNAIRES
HOW LIKELY ARE YOU TO NOD OFF OR FALL ASLEEP WHILE SITTING QUIETLY AFTER LUNCH WITHOUT ALCOHOL: 0
HOW LIKELY ARE YOU TO NOD OFF OR FALL ASLEEP WHILE SITTING INACTIVE IN A PUBLIC PLACE: 3
HOW LIKELY ARE YOU TO NOD OFF OR FALL ASLEEP IN A CAR, WHILE STOPPED FOR A FEW MINUTES IN TRAFFIC: 0
HOW LIKELY ARE YOU TO NOD OFF OR FALL ASLEEP WHEN YOU ARE A PASSENGER IN A CAR FOR AN HOUR WITHOUT A BREAK: 0
ESS TOTAL SCORE: 9
HOW LIKELY ARE YOU TO NOD OFF OR FALL ASLEEP WHILE WATCHING TV: 3
HOW LIKELY ARE YOU TO NOD OFF OR FALL ASLEEP WHILE SITTING AND TALKING TO SOMEONE: 0
HOW LIKELY ARE YOU TO NOD OFF OR FALL ASLEEP WHILE LYING DOWN TO REST IN THE AFTERNOON WHEN CIRCUMSTANCES PERMIT: 0
HOW LIKELY ARE YOU TO NOD OFF OR FALL ASLEEP WHILE SITTING AND READING: 3

## 2024-01-09 NOTE — TELEPHONE ENCOUNTER
Patient was on April schedule but is not a new patient.  Patient can see minna, cancelled apt and tried to contact no answer.  Will continue to try to contact to move to minna.

## 2024-01-09 NOTE — CONSULTS
Cardiothoracic Surgery  IN-PATIENT SERVICE   [facility]    CONSULTATION / HISTORY AND PHYSICAL EXAMINATION            Date:   1/9/2024  Patient name:  Jose Figueroa  Date of admission:  1/9/2024 11:21 AM  MRN:   [id number]  Account:  [id number]  YOB: 1965  PCP:    Arabella Boles MD  Room:   Cath Pool Room/PL  Code Status:    Prior    Physician Requesting Consult: Basil Genao MD    Reason for Consult:  CAD    Chief Complaint:     Dizziness and fatigue    History of Present Illness:     In August he presented to the ED with a 2 to 3-day history of gait instability headache.  He was hypertensive in the ED requiring a Cardene drip.  MRI demonstrated 3 small periventricular areas of diffusion restriction, in the left thalamus, right splenium of the corpus callosum, and right parietal ventricular white matter likely acute infarcts.  CTA showed severe stenosis of the origin of the P1 segment of the right PCA.  Moderate diffuse irregularity involving the proximal segments of the left PCA     11/7/23 Cerebral angiogram summary: Diffuse mild to moderate luminal irregularities identified throughout the intracranial circulation.  These are most consistent with intracranial atherosclerotic disease however diagnosis cannot be made exclusively on angiography.  No intervention recommended     He completed a 21 a day event monitor for which she followed up with cardiology.  He is scheduled for a heart cath in January.     He still endorses occipital headaches and dizziness. Reports headache last approximately a minute, occurring 4-5 times a day. He describes them as stabbing.  Denies n/v or vision changes. He was previously referred to neurology for headache management and he has not followed up as of yet.    He had a LHC today which showed multivessel CAD. We were then consulted for possible surgical intervention.    Past Medical History:     Past Medical

## 2024-01-09 NOTE — PROGRESS NOTES
Grandmother     Stroke Maternal Grandfather     Hypertension Maternal Uncle          Objective:     Vitals:    01/09/24 1034   BP: 121/69   Pulse: 66   SpO2: 96%   Weight: 120.7 kg (266 lb)   Height: 1.753 m (5' 9\")     Neck circumference (Inches): 18.25  Inches  Custar - Custar Sleepiness Score: 9    Gen: No distress.   Eyes: PERRL. No sclera icterus. No conjunctival injection.   ENT: No discharge. Pharynx clear.   Resp: No accessory muscle use.   CV: Without central or acrocyanosis  Psych: Oriented x 3. No anxiety.  Awake. Alert. Intact judgement and insight.  Neuro: PERRL, facial expressions symmetric, Alert and oriented to person, place and situation. Without dysmetria bilaterally. Shoulders symmetric at rest.     Mallampati Airway Classification:   []1 []2 []3 [x]4             CPAP Usage:    []  Patient has never worn CPAP  []  Patient has worn CPAP previously but discontinued use  []  Current PAP user,  [years]   []  Patient Tolerates Well   []  Patient Does Not Tolerate     []  Patient Uses CPAP      []  More Than 4 Hours      []  Less Than 4 Hours  []  CPAP/BPAP/ASV Pressure Readings   []  CPAP Pressure      cm H20   []  BPAP Pressure       cm H20   []  ASV Pressure         cm H20      Assessment:      Diagnosis:  HAWA     Plan:    Jose has a significant medical history including strokes, brain injury and severe HTN LVH. He has a chronic daily headache, dry mouth and sleepiness. Will order split night study to look for HAWA.     Sleep Study:      []  HST - Home Sleep Study   []  PSG - Overnight Diagnostic Polysomnogram     [x]  CPAP Titration    [x] Split Night Study    [] BiLevel Titration    [] ASV - Auto-Servo Ventilation Titration     []  PAP Nap Test       []  MSLT - Multiple Sleep Latency Test   []  MWT - Maintenance of Wakefulness Test    PAP Therapy:     []  Patient to be seen for new mask fitting/desensitization   []  AutoPAP Titration    []  CPAP supplies and equipment at ________cmH2O    []

## 2024-01-09 NOTE — PROGRESS NOTES
Dc instructions reviewed with pt and family. Pt verbalizes understanding.  R radial drsg dry and intact no hematoma.  Pt ambulated to br and voided.  Pt changed into clothes

## 2024-01-09 NOTE — DISCHARGE INSTRUCTIONS
Home instruction for Radial site Heart Catheterization:    Do not lift with affected arm for 3 days. Avoid lifting >10lbs. No flexing or bending affected wrist.    Remove dressing the next day, keep area clean and dry and covered with a new band aid daily until healed.    Do not submerge site in water for 3 days.    Slight tenderness is normal, but notify doctor if tingling of fingers/hand happens.    If bleeding or hematoma (blood collecting under the skin) occurs at site, apply pressure to slow the bleeding and notify doctor immediately.    No driving for 3 days.

## 2024-01-09 NOTE — PROGRESS NOTES
Patient returned back to holding.  CABG consult with Dr. Hou.  Plan for CT of chest without contrast prior to discharge home.  Office visit to be scheduled in near future with CT surgeon.  Patient return site assessment complete, patient on monitor, call light within reach, and family at bedside.

## 2024-01-09 NOTE — TELEPHONE ENCOUNTER
[facility], Auth#[id number] valid 01/09/24 thru 03/09/24

## 2024-01-10 ENCOUNTER — TELEPHONE (OUTPATIENT)
Dept: CARDIOTHORACIC SURGERY | Age: 59
End: 2024-01-10

## 2024-01-15 ENCOUNTER — TELEPHONE (OUTPATIENT)
Dept: CARDIOLOGY CLINIC | Age: 59
End: 2024-01-15
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
## 2024-01-16 ENCOUNTER — INITIAL CONSULT (OUTPATIENT)
Dept: CARDIOTHORACIC SURGERY | Age: 59
End: 2024-01-16
Payer: COMMERCIAL

## 2024-01-16 VITALS
OXYGEN SATURATION: 95 % | HEART RATE: 63 BPM | DIASTOLIC BLOOD PRESSURE: 72 MMHG | WEIGHT: 268.4 LBS | SYSTOLIC BLOOD PRESSURE: 110 MMHG | BODY MASS INDEX: 39.75 KG/M2 | HEIGHT: 69 IN

## 2024-01-16 DIAGNOSIS — Z01.818 PREOP EXAMINATION: Primary | ICD-10-CM

## 2024-01-16 DIAGNOSIS — I25.10 CAD, MULTIPLE VESSEL: ICD-10-CM

## 2024-01-16 PROCEDURE — 3017F COLORECTAL CA SCREEN DOC REV: CPT | Performed by: THORACIC SURGERY (CARDIOTHORACIC VASCULAR SURGERY)

## 2024-01-16 PROCEDURE — 3078F DIAST BP <80 MM HG: CPT | Performed by: THORACIC SURGERY (CARDIOTHORACIC VASCULAR SURGERY)

## 2024-01-16 PROCEDURE — 1036F TOBACCO NON-USER: CPT | Performed by: THORACIC SURGERY (CARDIOTHORACIC VASCULAR SURGERY)

## 2024-01-16 PROCEDURE — 3074F SYST BP LT 130 MM HG: CPT | Performed by: THORACIC SURGERY (CARDIOTHORACIC VASCULAR SURGERY)

## 2024-01-16 PROCEDURE — G8417 CALC BMI ABV UP PARAM F/U: HCPCS | Performed by: THORACIC SURGERY (CARDIOTHORACIC VASCULAR SURGERY)

## 2024-01-16 PROCEDURE — G8427 DOCREV CUR MEDS BY ELIG CLIN: HCPCS | Performed by: THORACIC SURGERY (CARDIOTHORACIC VASCULAR SURGERY)

## 2024-01-16 PROCEDURE — G8484 FLU IMMUNIZE NO ADMIN: HCPCS | Performed by: THORACIC SURGERY (CARDIOTHORACIC VASCULAR SURGERY)

## 2024-01-16 PROCEDURE — 99215 OFFICE O/P EST HI 40 MIN: CPT | Performed by: THORACIC SURGERY (CARDIOTHORACIC VASCULAR SURGERY)

## 2024-01-16 NOTE — H&P
chiEf complaints: shortness of breath  History of present illness:Jose is a 58 y.o.year old who  presents with for shortness of breath which is moderate to severe, for many years, intermittent, self limiting, not associated with cough or fever, gets worse with activity and better with rest,  He has dizziness with walking and standing., he had 21days event monitor for heart and he returned it to hospital 2 days ago       Chief Complaint   Patient presents with    Shortness of Breath       SOB on exertion and at rest, also gets headaches    Consultation       Consult for abnormal monitor results. Besides dizziness, headaches, and SOB, no new cardiac symptoms. Had a stroke before putting the monitor on.    Dizziness       It is balance issues type of dizziness, that lasts a few seconds      Blood pressure, cholesterol, blood glucose and weight are well controlled.     Past medical history:    has a past medical history of Diabetes mellitus (HCC), Hyperlipidemia, and Hypertension.  Past surgical history:   has a past surgical history that includes brain surgery.  Social History:   reports that he quit smoking about 20 years ago. His smoking use included cigarettes and cigars. He has never used smokeless tobacco. He reports that he does not currently use alcohol. He reports that he does not use drugs.  Family history:   no family history of CAD, STROKE of DM     No Known Allergies       Current Meds Link used for Sign Out Report   No current facility-administered medications for this visit.      Current Facility-Administered Medications          Current Outpatient Medications   Medication Sig Dispense Refill    amLODIPine (NORVASC) 5 MG tablet Take 1 tablet by mouth daily        lisinopril-hydroCHLOROthiazide (PRINZIDE;ZESTORETIC) 20-12.5 MG per tablet Take 1 tablet by mouth daily        isosorbide mononitrate (IMDUR) 60 MG extended release tablet Take 1 tablet by mouth daily 30 tablet 3    hydrALAZINE (APRESOLINE) 50

## 2024-01-17 ENCOUNTER — TELEPHONE (OUTPATIENT)
Dept: CARDIOTHORACIC SURGERY | Age: 59
End: 2024-01-17

## 2024-01-17 NOTE — TELEPHONE ENCOUNTER
Authorization for surgery has been submitted to [facility] on behalf of [facility], currently pending review, request#[id number]

## 2024-01-18 ENCOUNTER — TELEPHONE (OUTPATIENT)
Dept: CARDIOTHORACIC SURGERY | Age: 59
End: 2024-01-18

## 2024-01-18 RX ORDER — SODIUM CHLORIDE 0.9 % (FLUSH) 0.9 %
5-40 SYRINGE (ML) INJECTION EVERY 12 HOURS SCHEDULED
OUTPATIENT
Start: 2024-01-18

## 2024-01-18 RX ORDER — CHLORHEXIDINE GLUCONATE 4 G/100ML
SOLUTION TOPICAL ONCE
OUTPATIENT
Start: 2024-01-18 | End: 2024-01-18

## 2024-01-18 RX ORDER — SODIUM CHLORIDE 0.9 % (FLUSH) 0.9 %
5-40 SYRINGE (ML) INJECTION PRN
OUTPATIENT
Start: 2024-01-18

## 2024-01-18 RX ORDER — SODIUM CHLORIDE 9 MG/ML
INJECTION, SOLUTION INTRAVENOUS PRN
OUTPATIENT
Start: 2024-01-18

## 2024-01-18 RX ORDER — CHLORHEXIDINE GLUCONATE ORAL RINSE 1.2 MG/ML
15 SOLUTION DENTAL ONCE
OUTPATIENT
Start: 2024-01-18 | End: 2024-01-18

## 2024-01-18 NOTE — TELEPHONE ENCOUNTER
Called and spoke to patients , gave her his PAT date 01/22/24 arrive at 9:00am, Vein Map will follow same day. Surgery he needs to arrive at 10:00am for 12:00pm surgery, she voiced understanding of all info given

## 2024-01-18 NOTE — TELEPHONE ENCOUNTER
Home health nurse called as she was setting up patient medications for next week, medications were gone over on what and when to hold all medication prior to surgery with home health nurse, patient was with home health nurse.     Patient given instructions over telephone on CABG.  Surgery is scheduled for 1/29/24 @ 1200, w/arrival @ 10:00 AM, @ [facility]. Medication/Education Letter gone over with patient. Questions answered, Patient voiced understanding.        Patient was notified that surgery date or time could be changed due to an emergency. Patient voiced understanding.      Home health nurse: Jyoti  [phone]

## 2024-01-19 NOTE — PROGRESS NOTES
.Reminded of pre-testing on 1/22/24 @0900.   Bring insurance card, picture ID and a list of your home medications. Check in at the information desk in the lobby upon your arrival. You can eat and take morning medications. Patient verbalized understanding.

## 2024-01-22 ENCOUNTER — HOSPITAL ENCOUNTER (OUTPATIENT)
Age: 59
Discharge: HOME | End: 2024-01-22
Attending: INTERNAL MEDICINE
Payer: COMMERCIAL

## 2024-01-22 ENCOUNTER — HOSPITAL ENCOUNTER (OUTPATIENT)
Dept: PREADMISSION TESTING | Age: 59
Discharge: HOME | End: 2024-01-26
Payer: COMMERCIAL

## 2024-01-22 ENCOUNTER — HOSPITAL ENCOUNTER (OUTPATIENT)
Dept: ULTRASOUND IMAGING | Age: 59
Setting detail: OUTPATIENT SURGERY
Discharge: HOME | End: 2024-01-22
Attending: INTERNAL MEDICINE
Payer: COMMERCIAL

## 2024-01-22 ENCOUNTER — HOSPITAL ENCOUNTER (OUTPATIENT)
Age: 59
Discharge: HOME | End: 2024-01-24
Attending: INTERNAL MEDICINE
Payer: COMMERCIAL

## 2024-01-22 ENCOUNTER — HOSPITAL ENCOUNTER (OUTPATIENT)
Dept: GENERAL RADIOLOGY | Age: 59
Discharge: HOME | End: 2024-01-22
Attending: INTERNAL MEDICINE
Payer: COMMERCIAL

## 2024-01-22 VITALS
TEMPERATURE: 97 F | RESPIRATION RATE: 18 BRPM | HEART RATE: 63 BPM | WEIGHT: 266 LBS | HEIGHT: 69 IN | SYSTOLIC BLOOD PRESSURE: 142 MMHG | BODY MASS INDEX: 39.4 KG/M2 | DIASTOLIC BLOOD PRESSURE: 87 MMHG

## 2024-01-22 DIAGNOSIS — I25.10 CAD IN NATIVE ARTERY: ICD-10-CM

## 2024-01-22 DIAGNOSIS — Z01.818 PREOP EXAMINATION: ICD-10-CM

## 2024-01-22 LAB
ALBUMIN SERPL-MCNC: 4.2 GM/DL (ref 3.4–5)
ALP BLD-CCNC: 117 IU/L (ref 40–128)
ALT SERPL-CCNC: 14 U/L (ref 10–40)
ANION GAP SERPL CALCULATED.3IONS-SCNC: 10 MMOL/L (ref 7–16)
APTT: 31.2 SECONDS (ref 25.1–37.1)
AST SERPL-CCNC: 10 IU/L (ref 15–37)
BASOPHILS ABSOLUTE: 0 K/CU MM
BASOPHILS RELATIVE PERCENT: 0.4 % (ref 0–1)
BILIRUB SERPL-MCNC: 0.4 MG/DL (ref 0–1)
BILIRUBIN URINE: NEGATIVE MG/DL
BLOOD, URINE: NEGATIVE
BUN SERPL-MCNC: 13 MG/DL (ref 6–23)
CALCIUM SERPL-MCNC: 8.4 MG/DL (ref 8.3–10.6)
CHLORIDE BLD-SCNC: 97 MMOL/L (ref 99–110)
CLARITY: CLEAR
CO2: 27 MMOL/L (ref 21–32)
COLOR: YELLOW
COMMENT UA: NORMAL
CREAT SERPL-MCNC: 1 MG/DL (ref 0.9–1.3)
DIFFERENTIAL TYPE: ABNORMAL
EOSINOPHILS ABSOLUTE: 0.1 K/CU MM
EOSINOPHILS RELATIVE PERCENT: 0.7 % (ref 0–3)
ESTIMATED AVERAGE GLUCOSE: 143 MG/DL
GFR SERPL CREATININE-BSD FRML MDRD: >60 ML/MIN/1.73M2
GLUCOSE SERPL-MCNC: 154 MG/DL (ref 70–99)
GLUCOSE, URINE: NEGATIVE MG/DL
HBA1C MFR BLD: 6.6 % (ref 4.2–6.3)
HCT VFR BLD CALC: 40.3 % (ref 42–52)
HEMOGLOBIN: 13.6 GM/DL (ref 13.5–18)
IMMATURE NEUTROPHIL %: 0.4 % (ref 0–0.43)
INR BLD: 1 INDEX
KETONES, URINE: NEGATIVE MG/DL
LEUKOCYTE ESTERASE, URINE: NEGATIVE
LYMPHOCYTES ABSOLUTE: 1.2 K/CU MM
LYMPHOCYTES RELATIVE PERCENT: 16.2 % (ref 24–44)
MAGNESIUM: 2 MG/DL (ref 1.8–2.4)
MCH RBC QN AUTO: 28.3 PG (ref 27–31)
MCHC RBC AUTO-ENTMCNC: 33.7 % (ref 32–36)
MCV RBC AUTO: 84 FL (ref 78–100)
MONOCYTES ABSOLUTE: 0.5 K/CU MM
MONOCYTES RELATIVE PERCENT: 6.9 % (ref 0–4)
MRSA, DNA, NASAL: NEGATIVE
NITRITE URINE, QUANTITATIVE: NEGATIVE
NUCLEATED RBC %: 0 %
PDW BLD-RTO: 11.5 % (ref 11.7–14.9)
PH, URINE: 6 (ref 5–8)
PLATELET # BLD: 239 K/CU MM (ref 140–440)
PMV BLD AUTO: 9.4 FL (ref 7.5–11.1)
POTASSIUM SERPL-SCNC: 4.2 MMOL/L (ref 3.5–5.1)
PROTEIN UA: NEGATIVE MG/DL
PROTHROMBIN TIME: 12.9 SECONDS (ref 11.7–14.5)
RBC # BLD: 4.8 M/CU MM (ref 4.6–6.2)
SEGMENTED NEUTROPHILS ABSOLUTE COUNT: 5.5 K/CU MM
SEGMENTED NEUTROPHILS RELATIVE PERCENT: 75.4 % (ref 36–66)
SODIUM BLD-SCNC: 134 MMOL/L (ref 135–145)
SPECIFIC GRAVITY UA: 1.02 (ref 1–1.03)
SPECIMEN DESCRIPTION: NORMAL
TOTAL IMMATURE NEUTOROPHIL: 0.03 K/CU MM
TOTAL NUCLEATED RBC: 0 K/CU MM
TOTAL PROTEIN: 6.5 GM/DL (ref 6.4–8.2)
TSH SERPL DL<=0.005 MIU/L-ACNC: 1.8 UIU/ML (ref 0.27–4.2)
UROBILINOGEN, URINE: 0.2 MG/DL (ref 0.2–1)
WBC # BLD: 7.3 K/CU MM (ref 4–10.5)

## 2024-01-22 PROCEDURE — 84443 ASSAY THYROID STIM HORMONE: CPT

## 2024-01-22 PROCEDURE — 36415 COLL VENOUS BLD VENIPUNCTURE: CPT

## 2024-01-22 PROCEDURE — 85730 THROMBOPLASTIN TIME PARTIAL: CPT

## 2024-01-22 PROCEDURE — P9017 PLASMA 1 DONOR FRZ W/IN 8 HR: HCPCS

## 2024-01-22 PROCEDURE — 87641 MR-STAPH DNA AMP PROBE: CPT

## 2024-01-22 PROCEDURE — 83735 ASSAY OF MAGNESIUM: CPT

## 2024-01-22 PROCEDURE — 83036 HEMOGLOBIN GLYCOSYLATED A1C: CPT

## 2024-01-22 PROCEDURE — P9012 CRYOPRECIPITATE EACH UNIT: HCPCS

## 2024-01-22 PROCEDURE — 93005 ELECTROCARDIOGRAM TRACING: CPT

## 2024-01-22 PROCEDURE — 81003 URINALYSIS AUTO W/O SCOPE: CPT

## 2024-01-22 PROCEDURE — 86900 BLOOD TYPING SEROLOGIC ABO: CPT

## 2024-01-22 PROCEDURE — 86901 BLOOD TYPING SEROLOGIC RH(D): CPT

## 2024-01-22 PROCEDURE — 86850 RBC ANTIBODY SCREEN: CPT

## 2024-01-22 PROCEDURE — 71046 X-RAY EXAM CHEST 2 VIEWS: CPT

## 2024-01-22 PROCEDURE — 85610 PROTHROMBIN TIME: CPT

## 2024-01-22 PROCEDURE — 80053 COMPREHEN METABOLIC PANEL: CPT

## 2024-01-22 PROCEDURE — 87081 CULTURE SCREEN ONLY: CPT

## 2024-01-22 PROCEDURE — 93970 EXTREMITY STUDY: CPT

## 2024-01-22 PROCEDURE — 85025 COMPLETE CBC W/AUTO DIFF WBC: CPT

## 2024-01-22 NOTE — PROGRESS NOTES
Chart reviewed by cardiac rehab nurse. Met patient in lobby. Introduced myself and teaching plan.  Patient's planned procedure is CABG. Teaching done on A&P of the heart and formation of CAD. Explained the surgical process, Pre-Op,Inter-Op and Post-Op. Discussed length of stay and recovery.       Explanation given of pre op routine tests, lines/tubes/equipment, and infection control. Educated on weaning from ventilator, medication and equipment to expect, on progressive activity, post op pain, and how it will be managed. Encouraged pt to communicate about pain in order to create a partnership for his recovery success. Discussed importance of coughing and deep breathing and sternal precautions. Introduced multidisciplinary approach and daily routine in CVICU. Pt verbalized commitment to recovery program.        Teaching done on post discharge recovery, activity guidelines, and weight monitoring. Discussed follow up appointments, possibility of ECF, role of home health, and family involvement. Pt lives with family who will be assisting with his care when he returns home. Introduced benefits of out patient cardiac rehab after appropriate time frame. Went over visitation policy with patient. Given Understanding Heart Surgery book. Escorted to main exit with all personal belongings as well as items given to patient from facility for upcoming surgery.

## 2024-01-22 NOTE — PROGRESS NOTES
CVICU Open Heart Risk Factor Score    STS Criteria  Possible Score Yes/No Score Notes   Emergency Case 6 No  Mag- 2.0  Ca- 8.4  K- 4.2   Serum Creatinine     Cr- 1.0   >1.2 0 No     >1.6 to <1.8mg/dl 1 No     >.1.9 4 No     Acute/Chronic Renal Failure/Renal Insufficiency 0   No  Cr- 1.0  GFR- >60     Left Ventricular Dysfunction(EF<40%) 3   No  EF- 55-60%   Operative Mitral Valve Insufficiency 3   No     Reoperation 3 No     Age >65 and <74 years 1  No    Age- 58   Age >75 years 2 No     Prior Vascular Surgery 2   No     COPD +History of Patient Use of Bronchodilators 2   No     COPD 0 No     Current Smoker of History of Smoking  0   Yes 0 1.5 PPD for 26 years  Quit 1998   Anemia (Hematocrit<0.34) 2   No  Hct- 40.3   ASA / Anticoagulants/ Bleeding Tendencies / Antiplatelets 0   No  PT- 12.9  INR- 1.0  APTT- 31.2  PLT- 239   Operative Aortic Valve Stenosis 1 No       Weight<143 lbs (  BMI<18.5) 1   No  266 lb  120.7 kg  BMI- 39.28  Ht- 5'9 in   Weight >200 lbs (BMI >30    0   Yes 0    Diabetes Oral or Insulin Therapy 1   Yes 1 HA1C- 6.6  Home meds- Metformin   Cerebrovascular Disease 1   Yes 1    Impaired Mobility 0 No     Walker/Cane/Wheelchair 0 No     Recent MI 0 No     Hypertension 0 Yes 0 Home meds- Lopressor   Peripheral Vascular Disease 0 No     Lives Alone 0 No     Other (GI Auto Immune Hepatic etc) 0 No  -  -  -   TOTAL   2    Note The surgeon must be notified for all risk scores >7    Notified by JONO Jimenez RN

## 2024-01-22 NOTE — PROGRESS NOTES
.Surgery @ [facility] on 1/29/24 you will be called 1/28/24 with times    NOTHING TO EAT OR DRINK AFTER MIDNIGHT DAY OF SURGERY    1. Enter thru the hospital main entrance on day of surgery, check in at the Information Desk. If you arrive prior to 6:00am, enter thru the ER entrance.    2. Follow the directions as prescribed by the doctor for your procedure and medications.         Morning of surgery take: FOLLOW OFFICE INSTRUCTIONS FOR ANY HOME MEDICATIONS          Stop vitamins, supplements and NSAIDS:      3. Check with your Doctor regarding stopping blood thinners and follow their instructions.    4. Do not smoke, vape or use chewing tobacco morning of surgery. Do not drink any alcoholic beverages 24 hours prior to surgery.       This includes NA Beer. No street drugs 7 days prior to surgery.    5. If you have dentures, contacts of glasses they will be removed before going to the OR; please bring a case.    6. Please bring picture ID, insurance card, paperwork from the doctor’s office (H & P, Consent, & card for implantable devices).    7. Take a shower with an antibacterial soap the night before surgery and the morning of surgery. Do not put anything on your skin      After your morning shower.    8. You will need a responsible adult to drive you home and check on you after surgery.

## 2024-01-23 LAB
EKG ATRIAL RATE: 59 BPM
EKG DIAGNOSIS: NORMAL
EKG P AXIS: 39 DEGREES
EKG P-R INTERVAL: 160 MS
EKG Q-T INTERVAL: 466 MS
EKG QRS DURATION: 136 MS
EKG QTC CALCULATION (BAZETT): 461 MS
EKG R AXIS: 0 DEGREES
EKG T AXIS: -13 DEGREES
EKG VENTRICULAR RATE: 59 BPM

## 2024-01-23 PROCEDURE — 93010 ELECTROCARDIOGRAM REPORT: CPT | Performed by: INTERNAL MEDICINE

## 2024-01-24 ENCOUNTER — OFFICE VISIT (OUTPATIENT)
Dept: CARDIOLOGY CLINIC | Age: 59
End: 2024-01-24
Payer: COMMERCIAL

## 2024-01-24 ENCOUNTER — TELEPHONE (OUTPATIENT)
Dept: CARDIOTHORACIC SURGERY | Age: 59
End: 2024-01-24

## 2024-01-24 VITALS
SYSTOLIC BLOOD PRESSURE: 110 MMHG | HEIGHT: 70 IN | HEART RATE: 64 BPM | WEIGHT: 266 LBS | BODY MASS INDEX: 38.08 KG/M2 | DIASTOLIC BLOOD PRESSURE: 70 MMHG

## 2024-01-24 DIAGNOSIS — I51.7 LVH (LEFT VENTRICULAR HYPERTROPHY): ICD-10-CM

## 2024-01-24 DIAGNOSIS — I63.20 CEREBROVASCULAR ACCIDENT (CVA) DUE TO STENOSIS OF PRECEREBRAL ARTERY (HCC): ICD-10-CM

## 2024-01-24 DIAGNOSIS — I1A.0 RESISTANT HYPERTENSION: Primary | ICD-10-CM

## 2024-01-24 DIAGNOSIS — I25.118 CORONARY ARTERY DISEASE OF NATIVE ARTERY OF NATIVE HEART WITH STABLE ANGINA PECTORIS (HCC): ICD-10-CM

## 2024-01-24 PROCEDURE — 99214 OFFICE O/P EST MOD 30 MIN: CPT | Performed by: NURSE PRACTITIONER

## 2024-01-24 PROCEDURE — 1036F TOBACCO NON-USER: CPT | Performed by: NURSE PRACTITIONER

## 2024-01-24 PROCEDURE — 3017F COLORECTAL CA SCREEN DOC REV: CPT | Performed by: NURSE PRACTITIONER

## 2024-01-24 PROCEDURE — 3074F SYST BP LT 130 MM HG: CPT | Performed by: NURSE PRACTITIONER

## 2024-01-24 PROCEDURE — G8427 DOCREV CUR MEDS BY ELIG CLIN: HCPCS | Performed by: NURSE PRACTITIONER

## 2024-01-24 PROCEDURE — G8484 FLU IMMUNIZE NO ADMIN: HCPCS | Performed by: NURSE PRACTITIONER

## 2024-01-24 PROCEDURE — G8417 CALC BMI ABV UP PARAM F/U: HCPCS | Performed by: NURSE PRACTITIONER

## 2024-01-24 PROCEDURE — 3078F DIAST BP <80 MM HG: CPT | Performed by: NURSE PRACTITIONER

## 2024-01-24 ASSESSMENT — ENCOUNTER SYMPTOMS: SHORTNESS OF BREATH: 1

## 2024-01-24 NOTE — PROGRESS NOTES
[place] [address]  Phone: [phone]    Fax [phone]    Bradley Jim MD, [facility]  Esteban Ryan MD, [facility]   Connor Rene MD, [facility] MD Basil Medina MD, [facility]  Pedro Loja MD, [facility]    Caitlyn Weaver MD, [facility]  Alfred Balbuena MD, [facility]  Xiomy Benavides, APRN  Cecelia Ortiz, APRN  Tenisha Duenas, APRN  Felix Martinez, APRN        Cardiology Progress Note      1/24/2024    RE: Jose Garciaolz  (1965)                             Primary cardiologist: Dr. Stella Genao       Subjective:  CC:   1. Resistant hypertension    2. LVH (left ventricular hypertrophy)    3. Cerebrovascular accident (CVA) due to stenosis of precerebral artery (HCC)    4. Coronary artery disease of native artery of native heart with stable angina pectoris (HCC)        HPI: Jose Figueroa, who is a  58 y.o. year old male with a past medical history as listed below.  Patient presents to the office for follow up on LVH, SOB, HTN, and hyperlipidemia.  Patient was evaluated in emergency department in August 2023 and hypertensive emergency.  He was placed on Cardene drip at that time had complained of ongoing headache, MRI showed intracranial stenosis of P1 segment of the right PCA as well as diffuse irregularities in proximal left PCA.  He proceeded with cerebral angiogram which showed intracranial atherosclerosis disease, no intervention required.  Cardio emboli stroke was unable to be ruled out so patient wore 21-day event monitor that showed SVT and NSVT. Patient was placed on beta-blocker and followed up with stress test.  Stress of this was abnormal leading to [facility] which showed multivessel disease.  Patient is awaiting CABG on 1/29/2024.    Past Medical History:   Diagnosis Date    CVA (cerebral vascular accident) (HCC)     Diabetes mellitus (HCC)     Hyperlipidemia     Hypertension        Current Outpatient Medications   Medication Sig Dispense Refill

## 2024-01-24 NOTE — TELEPHONE ENCOUNTER
Called and spoke with home health nurse Jyoti, she took the plavix out of patients medication for the week along with the rest of the medication to hold.

## 2024-01-26 ENCOUNTER — ANESTHESIA EVENT (OUTPATIENT)
Dept: OPERATING ROOM | Age: 59
End: 2024-01-26
Payer: COMMERCIAL

## 2024-01-26 NOTE — PROGRESS NOTES
Patient notified of surgery time at [facility] 1/29/24 1245 arrival 1045, NPO instructions and DOS as per physician office instructions reviewed

## 2024-01-26 NOTE — ANESTHESIA PRE PROCEDURE
Department of Anesthesiology  Preprocedure Note       Name:  Jose Figueroa   Age:  58 y.o.  :  1965                                          MRN:  [id number]         Date:  2024      Surgeon: Surgeon(s):  Kaden Trejo MD    Procedure: Procedure(s):  CABG CORONARY ARTERY BYPASS X3    Medications prior to admission:   Prior to Admission medications    Medication Sig Start Date End Date Taking? Authorizing Provider   amLODIPine (NORVASC) 10 MG tablet Take 1 tablet by mouth daily 1/3/24   Felix Martinez APRN - CNP   ranolazine (RANEXA) 500 MG extended release tablet Take 1 tablet by mouth 2 times daily 1/3/24   Felix Martinez APRN - CNP   metoprolol tartrate (LOPRESSOR) 50 MG tablet Take 1 tablet by mouth 2 times daily 1/3/24   Felix Martinez APRN - CNP   naproxen (NAPROSYN) 500 MG tablet Take 1 tablet by mouth 2 times daily as needed for Pain 1/3/24 1/16/24  Jaden Cabezas MD   lisinopril-hydroCHLOROthiazide (PRINZIDE;ZESTORETIC) 20-12.5 MG per tablet Take 2 tablets by mouth daily 23   Felix Martinez APRN - CNP   lisinopril-hydroCHLOROthiazide (PRINZIDE;ZESTORETIC) 20-12.5 MG per tablet Take 1 tablet by mouth daily 9/3/23   Provider, MD Perlita   hydrALAZINE (APRESOLINE) 50 MG tablet Take 1 tablet by mouth every 12 hours 8/3/23   Matteo Harrell MD   Blood Pressure KIT 1 each by Does not apply route daily 8/3/23   Matteo Harrell MD   aspirin 81 MG chewable tablet Take 1 tablet by mouth daily  Patient not taking: Reported on 2024   Matteo Harrell MD   atorvastatin (LIPITOR) 40 MG tablet Take 1 tablet by mouth nightly 23   Matteo Harrell MD   doxazosin (CARDURA) 4 MG tablet Take 1 tablet by mouth nightly 23   Matteo Harrell MD   clopidogrel (PLAVIX) 75 MG tablet Take 1 tablet by mouth daily 23   Matteo Harrell MD   sertraline (ZOLOFT) 100 MG tablet Take 1 tablet by mouth daily    Provider, MD Perlita   metFORMIN (GLUCOPHAGE) 500 MG tablet

## 2024-01-28 LAB
ABO/RH: NORMAL
ANTIBODY SCREEN: NEGATIVE
COMMENT: NORMAL
COMPONENT: NORMAL
COMPONENT: NORMAL
CROSSMATCH RESULT: NORMAL
CROSSMATCH RESULT: NORMAL
STATUS: NORMAL
STATUS: NORMAL
THERMAL AMPLITUDE STUDY: NORMAL
TRANSFUSION STATUS: NORMAL
TRANSFUSION STATUS: NORMAL
UNIT DIVISION: 0
UNIT DIVISION: 0
UNIT NUMBER: NORMAL
UNIT NUMBER: NORMAL

## 2024-01-29 ENCOUNTER — HOSPITAL ENCOUNTER (INPATIENT)
Age: 59
LOS: 8 days | Discharge: NURSING, REHAB OR LONG-TERM CARE | DRG: 166 | End: 2024-02-06
Attending: THORACIC SURGERY (CARDIOTHORACIC VASCULAR SURGERY) | Admitting: THORACIC SURGERY (CARDIOTHORACIC VASCULAR SURGERY)
Payer: COMMERCIAL

## 2024-01-29 ENCOUNTER — ANESTHESIA (OUTPATIENT)
Dept: OPERATING ROOM | Age: 59
End: 2024-01-29
Payer: COMMERCIAL

## 2024-01-29 ENCOUNTER — APPOINTMENT (OUTPATIENT)
Dept: GENERAL RADIOLOGY | Age: 59
DRG: 166 | End: 2024-01-29
Attending: THORACIC SURGERY (CARDIOTHORACIC VASCULAR SURGERY)
Payer: COMMERCIAL

## 2024-01-29 DIAGNOSIS — Z95.1 S/P 2-VESSEL CORONARY ARTERY BYPASS: Primary | ICD-10-CM

## 2024-01-29 PROBLEM — I25.10 CAD IN NATIVE ARTERY: Status: ACTIVE | Noted: 2024-01-29

## 2024-01-29 LAB
ABO/RH: NORMAL
ANION GAP SERPL CALCULATED.3IONS-SCNC: 10 MMOL/L (ref 7–16)
ANION GAP SERPL CALCULATED.3IONS-SCNC: 10 MMOL/L (ref 7–16)
ANTIBODY SCREEN: NEGATIVE
APTT: 28.3 SECONDS (ref 25.1–37.1)
BASE EXCESS MIXED: 0.2 (ref 0–3)
BASE EXCESS MIXED: 0.2 (ref 0–3)
BASE EXCESS MIXED: 1.7 (ref 0–3)
BASE EXCESS MIXED: 1.8 (ref 0–3)
BASE EXCESS MIXED: 2 (ref 0–3)
BASE EXCESS MIXED: 2.4 (ref 0–3)
BASE EXCESS MIXED: 2.7 (ref 0–3)
BASE EXCESS MIXED: 2.9 (ref 0–3)
BASE EXCESS MIXED: 3 (ref 0–3)
BASE EXCESS MIXED: 3.4 (ref 0–3)
BASE EXCESS MIXED: 3.4 (ref 0–3)
BASE EXCESS MIXED: 3.5 (ref 0–3)
BASE EXCESS MIXED: 4.3 (ref 0–3)
BASE EXCESS MIXED: 4.4 (ref 0–3)
BASE EXCESS MIXED: 5.2 (ref 0–3)
BASE EXCESS: ABNORMAL (ref 0–3)
BUN SERPL-MCNC: 13 MG/DL (ref 6–23)
BUN SERPL-MCNC: 13 MG/DL (ref 6–23)
CALCIUM IONIZED: 4.76 MG/DL (ref 4.48–5.28)
CALCIUM SERPL-MCNC: 7.6 MG/DL (ref 8.3–10.6)
CALCIUM SERPL-MCNC: 8.2 MG/DL (ref 8.3–10.6)
CHLORIDE BLD-SCNC: 102 MMOL/L (ref 99–110)
CHLORIDE BLD-SCNC: 105 MMOL/L (ref 99–110)
CO2 CONTENT: 25 MMOL/L (ref 21–32)
CO2 CONTENT: 25 MMOL/L (ref 21–32)
CO2: 22 MMOL/L (ref 21–32)
CO2: 23 MMOL/L (ref 21–32)
CO2: 23 MMOL/L (ref 21–32)
CO2: 24 MMOL/L (ref 21–32)
CO2: 25 MMOL/L (ref 21–32)
CO2: 26 MMOL/L (ref 21–32)
CO2: 29 MMOL/L (ref 21–32)
COMMENT: NORMAL
COMPONENT: NORMAL
COMPONENT: NORMAL
CREAT SERPL-MCNC: 1 MG/DL (ref 0.9–1.3)
CREAT SERPL-MCNC: 1.1 MG/DL (ref 0.9–1.3)
CROSSMATCH RESULT: NORMAL
CROSSMATCH RESULT: NORMAL
EGFR, POC: >60 ML/MIN/1.73M2
EGFR, POC: ABNORMAL ML/MIN/1.73M2
FIBRINOGEN LEVEL: 248 MG/DL (ref 170–540)
GFR SERPL CREATININE-BSD FRML MDRD: >60 ML/MIN/1.73M2
GFR SERPL CREATININE-BSD FRML MDRD: >60 ML/MIN/1.73M2
GLUCOSE BLD-MCNC: 121 MG/DL (ref 70–99)
GLUCOSE BLD-MCNC: 122 MG/DL (ref 70–99)
GLUCOSE BLD-MCNC: 123 MG/DL (ref 70–99)
GLUCOSE BLD-MCNC: 133 MG/DL (ref 70–99)
GLUCOSE BLD-MCNC: 150 MG/DL (ref 70–99)
GLUCOSE BLD-MCNC: 164 MG/DL (ref 70–99)
GLUCOSE BLD-MCNC: 167 MG/DL (ref 70–99)
GLUCOSE BLD-MCNC: 176 MG/DL (ref 70–99)
GLUCOSE BLD-MCNC: 177 MG/DL (ref 70–99)
GLUCOSE BLD-MCNC: 179 MG/DL (ref 70–99)
GLUCOSE BLD-MCNC: 185 MG/DL (ref 70–99)
GLUCOSE BLD-MCNC: 188 MG/DL (ref 70–99)
GLUCOSE BLD-MCNC: 194 MG/DL (ref 70–99)
GLUCOSE BLD-MCNC: 200 MG/DL (ref 70–99)
GLUCOSE BLD-MCNC: 209 MG/DL (ref 70–99)
GLUCOSE SERPL-MCNC: 123 MG/DL (ref 70–99)
GLUCOSE SERPL-MCNC: 168 MG/DL (ref 70–99)
HCO3 ARTERIAL: 20.4 MMOL/L (ref 21–28)
HCO3 ARTERIAL: 21.6 MMOL/L (ref 21–28)
HCO3 ARTERIAL: 22.4 MMOL/L (ref 21–28)
HCO3 ARTERIAL: 22.6 MMOL/L (ref 21–28)
HCO3 ARTERIAL: 22.6 MMOL/L (ref 21–28)
HCO3 ARTERIAL: 23 MMOL/L (ref 21–28)
HCO3 ARTERIAL: 23.4 MMOL/L (ref 21–28)
HCO3 ARTERIAL: 23.6 MMOL/L (ref 21–28)
HCO3 ARTERIAL: 23.6 MMOL/L (ref 21–28)
HCO3 ARTERIAL: 24.2 MMOL/L (ref 21–28)
HCO3 ARTERIAL: 24.3 MMOL/L (ref 21–28)
HCO3 ARTERIAL: 24.5 MMOL/L (ref 21–28)
HCO3 ARTERIAL: 24.9 MMOL/L (ref 21–28)
HCO3 ARTERIAL: 25 MMOL/L (ref 21–28)
HCO3 ARTERIAL: 27.8 MMOL/L (ref 21–28)
HCT VFR BLD CALC: 25 % (ref 38–51)
HCT VFR BLD CALC: 28 % (ref 38–51)
HCT VFR BLD CALC: 28 % (ref 38–51)
HCT VFR BLD CALC: 28 % (ref 42–52)
HCT VFR BLD CALC: 29 % (ref 38–51)
HCT VFR BLD CALC: 29 % (ref 38–51)
HCT VFR BLD CALC: 31 % (ref 38–51)
HCT VFR BLD CALC: 31.8 % (ref 42–52)
HCT VFR BLD CALC: 32 % (ref 38–51)
HCT VFR BLD CALC: 32 % (ref 38–51)
HCT VFR BLD CALC: 33 % (ref 38–51)
HCT VFR BLD CALC: 33 % (ref 38–51)
HCT VFR BLD CALC: 34 % (ref 38–51)
HCT VFR BLD CALC: 34 % (ref 38–51)
HEMOGLOBIN: 10 GM/DL (ref 12–17)
HEMOGLOBIN: 10.4 GM/DL (ref 13.5–18)
HEMOGLOBIN: 10.6 GM/DL (ref 12–17)
HEMOGLOBIN: 11 GM/DL (ref 12–17)
HEMOGLOBIN: 11 GM/DL (ref 12–17)
HEMOGLOBIN: 11.1 GM/DL (ref 12–17)
HEMOGLOBIN: 11.3 GM/DL (ref 12–17)
HEMOGLOBIN: 11.7 GM/DL (ref 12–17)
HEMOGLOBIN: 11.7 GM/DL (ref 12–17)
HEMOGLOBIN: 8.4 GM/DL (ref 12–17)
HEMOGLOBIN: 9.5 GM/DL (ref 12–17)
HEMOGLOBIN: 9.6 GM/DL (ref 12–17)
HEMOGLOBIN: 9.6 GM/DL (ref 13.5–18)
HEMOGLOBIN: 9.8 GM/DL (ref 12–17)
INR BLD: 1.2 INDEX
IONIZED CA: 1.19 MMOL/L (ref 1.12–1.32)
MAGNESIUM: 2.7 MG/DL (ref 1.8–2.4)
MCH RBC QN AUTO: 28.6 PG (ref 27–31)
MCHC RBC AUTO-ENTMCNC: 32.7 % (ref 32–36)
MCV RBC AUTO: 87.4 FL (ref 78–100)
O2 SATURATION: 100 % (ref 94–98)
O2 SATURATION: 91.8 % (ref 94–98)
O2 SATURATION: 92.4 % (ref 94–98)
O2 SATURATION: 92.5 % (ref 94–98)
O2 SATURATION: 95.5 % (ref 94–98)
O2 SATURATION: 95.8 % (ref 94–98)
O2 SATURATION: 96.1 % (ref 94–98)
O2 SATURATION: 96.2 % (ref 94–98)
O2 SATURATION: 96.3 % (ref 94–98)
O2 SATURATION: 96.7 % (ref 94–98)
O2 SATURATION: 97.9 % (ref 94–98)
O2 SATURATION: 99.9 % (ref 94–98)
PCO2 ARTERIAL: 38.4 MMHG (ref 35–48)
PCO2 ARTERIAL: 39.2 MMHG (ref 35–48)
PCO2 ARTERIAL: 41.7 MMHG (ref 35–48)
PCO2 ARTERIAL: 42.2 MMHG (ref 35–48)
PCO2 ARTERIAL: 42.2 MMHG (ref 35–48)
PCO2 ARTERIAL: 42.5 MMHG (ref 35–48)
PCO2 ARTERIAL: 43.3 MMHG (ref 35–48)
PCO2 ARTERIAL: 44.7 MMHG (ref 35–48)
PCO2 ARTERIAL: 46.2 MMHG (ref 35–48)
PCO2 ARTERIAL: 46.9 MMHG (ref 35–48)
PCO2 ARTERIAL: 47.1 MMHG (ref 35–48)
PCO2 ARTERIAL: 48 MMHG (ref 35–48)
PCO2 ARTERIAL: 48.9 MMHG (ref 35–48)
PCO2 ARTERIAL: 50.2 MMHG (ref 35–48)
PCO2 ARTERIAL: 56.8 MMHG (ref 35–48)
PDW BLD-RTO: 11.9 % (ref 11.7–14.9)
PH BLOOD: 7.24 (ref 7.35–7.45)
PH BLOOD: 7.26 (ref 7.35–7.45)
PH BLOOD: 7.3 (ref 7.35–7.45)
PH BLOOD: 7.31 (ref 7.35–7.45)
PH BLOOD: 7.31 (ref 7.35–7.45)
PH BLOOD: 7.32 (ref 7.35–7.45)
PH BLOOD: 7.33 (ref 7.35–7.45)
PH BLOOD: 7.33 (ref 7.35–7.45)
PH BLOOD: 7.34 (ref 7.35–7.45)
PH BLOOD: 7.35 (ref 7.35–7.45)
PH BLOOD: 7.38 (ref 7.35–7.45)
PH BLOOD: 7.39 (ref 7.35–7.45)
PH BLOOD: 7.41 (ref 7.35–7.45)
PHOSPHORUS: 2.6 MG/DL (ref 2.5–4.9)
PLATELET # BLD: 161 K/CU MM (ref 140–440)
PMV BLD AUTO: 9.5 FL (ref 7.5–11.1)
PO2 ARTERIAL: 112.5 MMHG (ref 83–108)
PO2 ARTERIAL: 271.1 MMHG (ref 83–108)
PO2 ARTERIAL: 278.5 MMHG (ref 83–108)
PO2 ARTERIAL: 283.5 MMHG (ref 83–108)
PO2 ARTERIAL: 288.2 MMHG (ref 83–108)
PO2 ARTERIAL: 474.6 MMHG (ref 83–108)
PO2 ARTERIAL: 69.3 MMHG (ref 83–108)
PO2 ARTERIAL: 72.5 MMHG (ref 83–108)
PO2 ARTERIAL: 77.5 MMHG (ref 83–108)
PO2 ARTERIAL: 84.1 MMHG (ref 83–108)
PO2 ARTERIAL: 85.3 MMHG (ref 83–108)
PO2 ARTERIAL: 89.3 MMHG (ref 83–108)
PO2 ARTERIAL: 91.1 MMHG (ref 83–108)
PO2 ARTERIAL: 95.3 MMHG (ref 83–108)
PO2 ARTERIAL: 95.8 MMHG (ref 83–108)
POC ACT PLUS: 105 SEC
POC ACT PLUS: 606 SEC
POC ACT PLUS: 89 SEC
POC ACT PLUS: 900 SEC
POC CALCIUM: 1.06 MMOL/L (ref 1.15–1.33)
POC CALCIUM: 1.08 MMOL/L (ref 1.15–1.33)
POC CALCIUM: 1.1 MMOL/L (ref 1.15–1.33)
POC CALCIUM: 1.12 MMOL/L (ref 1.15–1.33)
POC CALCIUM: 1.13 MMOL/L (ref 1.15–1.33)
POC CALCIUM: 1.14 MMOL/L (ref 1.15–1.33)
POC CALCIUM: 1.18 MMOL/L (ref 1.15–1.33)
POC CALCIUM: 1.21 MMOL/L (ref 1.15–1.33)
POC CALCIUM: 1.31 MMOL/L (ref 1.15–1.33)
POC CHLORIDE: 103 MMOL/L (ref 99–100)
POC CHLORIDE: 104 MMOL/L (ref 99–100)
POC CHLORIDE: 105 MMOL/L (ref 99–100)
POC CHLORIDE: 105 MMOL/L (ref 99–100)
POC CHLORIDE: 107 MMOL/L (ref 99–100)
POC CHLORIDE: 108 MMOL/L (ref 99–100)
POC CHLORIDE: 109 MMOL/L (ref 99–100)
POC CHLORIDE: 109 MMOL/L (ref 99–100)
POC CHLORIDE: 110 MMOL/L (ref 99–100)
POC CHLORIDE: 111 MMOL/L (ref 99–100)
POC CREATININE: 0.8 MG/DL (ref 0.5–1.2)
POC CREATININE: 0.8 MG/DL (ref 0.5–1.2)
POC CREATININE: 1 MG/DL (ref 0.5–1.2)
POC CREATININE: 1.1 MG/DL (ref 0.5–1.2)
POC CREATININE: 1.1 MG/DL (ref 0.5–1.2)
POC CREATININE: 1.2 MG/DL (ref 0.5–1.2)
POC CREATININE: 1.2 MG/DL (ref 0.5–1.2)
POTASSIUM SERPL-SCNC: 3.8 MMOL/L (ref 3.5–5.1)
POTASSIUM SERPL-SCNC: 3.8 MMOL/L (ref 3.5–5.1)
POTASSIUM SERPL-SCNC: 3.9 MMOL/L (ref 3.5–5.1)
POTASSIUM SERPL-SCNC: 3.9 MMOL/L (ref 3.5–5.1)
POTASSIUM SERPL-SCNC: 4 MMOL/L (ref 3.5–5.1)
POTASSIUM SERPL-SCNC: 4.1 MMOL/L (ref 3.5–5.1)
POTASSIUM SERPL-SCNC: 4.2 MMOL/L (ref 3.5–5.1)
POTASSIUM SERPL-SCNC: 4.4 MMOL/L (ref 3.5–5.1)
POTASSIUM SERPL-SCNC: 4.5 MMOL/L (ref 3.5–5.1)
POTASSIUM SERPL-SCNC: 4.5 MMOL/L (ref 3.5–5.1)
POTASSIUM SERPL-SCNC: 5.1 MMOL/L (ref 3.5–5.1)
PROTHROMBIN TIME: 15.7 SECONDS (ref 11.7–14.5)
RBC # BLD: 3.64 M/CU MM (ref 4.6–6.2)
SODIUM BLD-SCNC: 137 MMOL/L (ref 135–145)
SODIUM BLD-SCNC: 138 MMOL/L (ref 135–145)
SODIUM BLD-SCNC: 139 MMOL/L (ref 135–145)
SODIUM BLD-SCNC: 140 MMOL/L (ref 135–145)
SODIUM BLD-SCNC: 140 MMOL/L (ref 135–145)
SODIUM BLD-SCNC: 141 MMOL/L (ref 135–145)
SODIUM BLD-SCNC: 142 MMOL/L (ref 135–145)
SOURCE, BLOOD GAS: ABNORMAL
STATUS: NORMAL
STATUS: NORMAL
THERMAL AMPLITUDE STUDY: NORMAL
TRANSFUSION STATUS: NORMAL
TRANSFUSION STATUS: NORMAL
UNIT DIVISION: 0
UNIT DIVISION: 0
UNIT NUMBER: NORMAL
UNIT NUMBER: NORMAL
WBC # BLD: 16.2 K/CU MM (ref 4–10.5)

## 2024-01-29 PROCEDURE — 2500000003 HC RX 250 WO HCPCS: Performed by: THORACIC SURGERY (CARDIOTHORACIC VASCULAR SURGERY)

## 2024-01-29 PROCEDURE — 3700000001 HC ADD 15 MINUTES (ANESTHESIA): Performed by: THORACIC SURGERY (CARDIOTHORACIC VASCULAR SURGERY)

## 2024-01-29 PROCEDURE — 2100000000 HC CCU R&B

## 2024-01-29 PROCEDURE — 6370000000 HC RX 637 (ALT 250 FOR IP): Performed by: PHYSICIAN ASSISTANT

## 2024-01-29 PROCEDURE — 3700000000 HC ANESTHESIA ATTENDED CARE: Performed by: THORACIC SURGERY (CARDIOTHORACIC VASCULAR SURGERY)

## 2024-01-29 PROCEDURE — 74018 RADEX ABDOMEN 1 VIEW: CPT

## 2024-01-29 PROCEDURE — 33517 CABG ARTERY-VEIN SINGLE: CPT | Performed by: PHYSICIAN ASSISTANT

## 2024-01-29 PROCEDURE — 85610 PROTHROMBIN TIME: CPT

## 2024-01-29 PROCEDURE — 82962 GLUCOSE BLOOD TEST: CPT

## 2024-01-29 PROCEDURE — B24BZZ4 ULTRASONOGRAPHY OF HEART WITH AORTA, TRANSESOPHAGEAL: ICD-10-PCS | Performed by: THORACIC SURGERY (CARDIOTHORACIC VASCULAR SURGERY)

## 2024-01-29 PROCEDURE — 2700000000 HC OXYGEN THERAPY PER DAY

## 2024-01-29 PROCEDURE — P9045 ALBUMIN (HUMAN), 5%, 250 ML: HCPCS

## 2024-01-29 PROCEDURE — 2780000010 HC IMPLANT OTHER: Performed by: THORACIC SURGERY (CARDIOTHORACIC VASCULAR SURGERY)

## 2024-01-29 PROCEDURE — 6360000002 HC RX W HCPCS

## 2024-01-29 PROCEDURE — 2580000003 HC RX 258

## 2024-01-29 PROCEDURE — C1751 CATH, INF, PER/CENT/MIDLINE: HCPCS | Performed by: THORACIC SURGERY (CARDIOTHORACIC VASCULAR SURGERY)

## 2024-01-29 PROCEDURE — 2580000003 HC RX 258: Performed by: NURSE PRACTITIONER

## 2024-01-29 PROCEDURE — 2580000003 HC RX 258: Performed by: THORACIC SURGERY (CARDIOTHORACIC VASCULAR SURGERY)

## 2024-01-29 PROCEDURE — 2709999900 HC NON-CHARGEABLE SUPPLY: Performed by: THORACIC SURGERY (CARDIOTHORACIC VASCULAR SURGERY)

## 2024-01-29 PROCEDURE — 6370000000 HC RX 637 (ALT 250 FOR IP): Performed by: NURSE PRACTITIONER

## 2024-01-29 PROCEDURE — 6370000000 HC RX 637 (ALT 250 FOR IP): Performed by: THORACIC SURGERY (CARDIOTHORACIC VASCULAR SURGERY)

## 2024-01-29 PROCEDURE — 33533 CABG ARTERIAL SINGLE: CPT | Performed by: THORACIC SURGERY (CARDIOTHORACIC VASCULAR SURGERY)

## 2024-01-29 PROCEDURE — C1729 CATH, DRAINAGE: HCPCS | Performed by: THORACIC SURGERY (CARDIOTHORACIC VASCULAR SURGERY)

## 2024-01-29 PROCEDURE — 02HV33Z INSERTION OF INFUSION DEVICE INTO SUPERIOR VENA CAVA, PERCUTANEOUS APPROACH: ICD-10-PCS | Performed by: THORACIC SURGERY (CARDIOTHORACIC VASCULAR SURGERY)

## 2024-01-29 PROCEDURE — 84132 ASSAY OF SERUM POTASSIUM: CPT

## 2024-01-29 PROCEDURE — 80048 BASIC METABOLIC PNL TOTAL CA: CPT

## 2024-01-29 PROCEDURE — 6360000002 HC RX W HCPCS: Performed by: PHYSICIAN ASSISTANT

## 2024-01-29 PROCEDURE — 82330 ASSAY OF CALCIUM: CPT

## 2024-01-29 PROCEDURE — 2500000003 HC RX 250 WO HCPCS

## 2024-01-29 PROCEDURE — 2580000003 HC RX 258: Performed by: PHYSICIAN ASSISTANT

## 2024-01-29 PROCEDURE — 7100000000 HC PACU RECOVERY - FIRST 15 MIN

## 2024-01-29 PROCEDURE — 85384 FIBRINOGEN ACTIVITY: CPT

## 2024-01-29 PROCEDURE — 84295 ASSAY OF SERUM SODIUM: CPT

## 2024-01-29 PROCEDURE — 06BQ4ZZ EXCISION OF LEFT SAPHENOUS VEIN, PERCUTANEOUS ENDOSCOPIC APPROACH: ICD-10-PCS | Performed by: THORACIC SURGERY (CARDIOTHORACIC VASCULAR SURGERY)

## 2024-01-29 PROCEDURE — 021009W BYPASS CORONARY ARTERY, ONE ARTERY FROM AORTA WITH AUTOLOGOUS VENOUS TISSUE, OPEN APPROACH: ICD-10-PCS | Performed by: THORACIC SURGERY (CARDIOTHORACIC VASCULAR SURGERY)

## 2024-01-29 PROCEDURE — 84100 ASSAY OF PHOSPHORUS: CPT

## 2024-01-29 PROCEDURE — 83735 ASSAY OF MAGNESIUM: CPT

## 2024-01-29 PROCEDURE — 3600000018 HC SURGERY OHS ADDTL 15MIN: Performed by: THORACIC SURGERY (CARDIOTHORACIC VASCULAR SURGERY)

## 2024-01-29 PROCEDURE — 02100Z9 BYPASS CORONARY ARTERY, ONE ARTERY FROM LEFT INTERNAL MAMMARY, OPEN APPROACH: ICD-10-PCS | Performed by: THORACIC SURGERY (CARDIOTHORACIC VASCULAR SURGERY)

## 2024-01-29 PROCEDURE — P9045 ALBUMIN (HUMAN), 5%, 250 ML: HCPCS | Performed by: PHYSICIAN ASSISTANT

## 2024-01-29 PROCEDURE — 33508 ENDOSCOPIC VEIN HARVEST: CPT | Performed by: THORACIC SURGERY (CARDIOTHORACIC VASCULAR SURGERY)

## 2024-01-29 PROCEDURE — 33517 CABG ARTERY-VEIN SINGLE: CPT | Performed by: THORACIC SURGERY (CARDIOTHORACIC VASCULAR SURGERY)

## 2024-01-29 PROCEDURE — 94761 N-INVAS EAR/PLS OXIMETRY MLT: CPT

## 2024-01-29 PROCEDURE — 2500000003 HC RX 250 WO HCPCS: Performed by: PHYSICIAN ASSISTANT

## 2024-01-29 PROCEDURE — P9016 RBC LEUKOCYTES REDUCED: HCPCS

## 2024-01-29 PROCEDURE — 3600000008 HC SURGERY OHS BASE: Performed by: THORACIC SURGERY (CARDIOTHORACIC VASCULAR SURGERY)

## 2024-01-29 PROCEDURE — 85347 COAGULATION TIME ACTIVATED: CPT

## 2024-01-29 PROCEDURE — 85730 THROMBOPLASTIN TIME PARTIAL: CPT

## 2024-01-29 PROCEDURE — 85014 HEMATOCRIT: CPT

## 2024-01-29 PROCEDURE — 33533 CABG ARTERIAL SINGLE: CPT | Performed by: PHYSICIAN ASSISTANT

## 2024-01-29 PROCEDURE — 82565 ASSAY OF CREATININE: CPT

## 2024-01-29 PROCEDURE — 6360000002 HC RX W HCPCS: Performed by: NURSE ANESTHETIST, CERTIFIED REGISTERED

## 2024-01-29 PROCEDURE — A4217 STERILE WATER/SALINE, 500 ML: HCPCS | Performed by: THORACIC SURGERY (CARDIOTHORACIC VASCULAR SURGERY)

## 2024-01-29 PROCEDURE — 82803 BLOOD GASES ANY COMBINATION: CPT

## 2024-01-29 PROCEDURE — 2500000003 HC RX 250 WO HCPCS: Performed by: NURSE ANESTHETIST, CERTIFIED REGISTERED

## 2024-01-29 PROCEDURE — 80051 ELECTROLYTE PANEL: CPT

## 2024-01-29 PROCEDURE — 2580000003 HC RX 258: Performed by: NURSE ANESTHETIST, CERTIFIED REGISTERED

## 2024-01-29 PROCEDURE — 85018 HEMOGLOBIN: CPT

## 2024-01-29 PROCEDURE — 6360000002 HC RX W HCPCS: Performed by: THORACIC SURGERY (CARDIOTHORACIC VASCULAR SURGERY)

## 2024-01-29 PROCEDURE — 81003 URINALYSIS AUTO W/O SCOPE: CPT

## 2024-01-29 PROCEDURE — 2720000010 HC SURG SUPPLY STERILE: Performed by: THORACIC SURGERY (CARDIOTHORACIC VASCULAR SURGERY)

## 2024-01-29 PROCEDURE — 85027 COMPLETE CBC AUTOMATED: CPT

## 2024-01-29 PROCEDURE — 6360000002 HC RX W HCPCS: Performed by: NURSE PRACTITIONER

## 2024-01-29 PROCEDURE — 5A1221Z PERFORMANCE OF CARDIAC OUTPUT, CONTINUOUS: ICD-10-PCS | Performed by: THORACIC SURGERY (CARDIOTHORACIC VASCULAR SURGERY)

## 2024-01-29 PROCEDURE — 71045 X-RAY EXAM CHEST 1 VIEW: CPT

## 2024-01-29 DEVICE — CLIP INT L ORNG TI TRNSVRS GRV CHEVRON SHP W/ PRECIS TIP TO: Type: IMPLANTABLE DEVICE | Status: FUNCTIONAL

## 2024-01-29 RX ORDER — HEPARIN SODIUM 1000 [USP'U]/ML
INJECTION, SOLUTION INTRAVENOUS; SUBCUTANEOUS PRN
Status: DISCONTINUED | OUTPATIENT
Start: 2024-01-29 | End: 2024-01-29 | Stop reason: SDUPTHER

## 2024-01-29 RX ORDER — DEXTROSE MONOHYDRATE 100 MG/ML
INJECTION, SOLUTION INTRAVENOUS CONTINUOUS PRN
Status: DISCONTINUED | OUTPATIENT
Start: 2024-01-29 | End: 2024-02-06 | Stop reason: HOSPADM

## 2024-01-29 RX ORDER — IPRATROPIUM BROMIDE AND ALBUTEROL SULFATE 2.5; .5 MG/3ML; MG/3ML
1 SOLUTION RESPIRATORY (INHALATION)
Status: DISCONTINUED | OUTPATIENT
Start: 2024-01-29 | End: 2024-02-03

## 2024-01-29 RX ORDER — POTASSIUM CHLORIDE 29.8 MG/ML
20 INJECTION INTRAVENOUS PRN
Status: DISCONTINUED | OUTPATIENT
Start: 2024-01-29 | End: 2024-02-06 | Stop reason: HOSPADM

## 2024-01-29 RX ORDER — FENTANYL CITRATE 0.05 MG/ML
INJECTION, SOLUTION INTRAMUSCULAR; INTRAVENOUS PRN
Status: DISCONTINUED | OUTPATIENT
Start: 2024-01-29 | End: 2024-01-29 | Stop reason: SDUPTHER

## 2024-01-29 RX ORDER — LIDOCAINE HYDROCHLORIDE 20 MG/ML
INJECTION, SOLUTION EPIDURAL; INFILTRATION; INTRACAUDAL; PERINEURAL PRN
Status: DISCONTINUED | OUTPATIENT
Start: 2024-01-29 | End: 2024-01-29 | Stop reason: SDUPTHER

## 2024-01-29 RX ORDER — ALBUMIN, HUMAN INJ 5% 5 %
SOLUTION INTRAVENOUS PRN
Status: DISCONTINUED | OUTPATIENT
Start: 2024-01-29 | End: 2024-01-29 | Stop reason: SDUPTHER

## 2024-01-29 RX ORDER — PAPAVERINE HYDROCHLORIDE 30 MG/ML
INJECTION INTRAMUSCULAR; INTRAVENOUS
Status: COMPLETED | OUTPATIENT
Start: 2024-01-29 | End: 2024-01-29

## 2024-01-29 RX ORDER — FAMOTIDINE 20 MG/1
20 TABLET, FILM COATED ORAL 2 TIMES DAILY
Status: DISCONTINUED | OUTPATIENT
Start: 2024-01-29 | End: 2024-02-06 | Stop reason: HOSPADM

## 2024-01-29 RX ORDER — POTASSIUM CHLORIDE 7.45 MG/ML
10 INJECTION INTRAVENOUS PRN
Status: DISCONTINUED | OUTPATIENT
Start: 2024-01-29 | End: 2024-02-06 | Stop reason: HOSPADM

## 2024-01-29 RX ORDER — GABAPENTIN 300 MG/1
300 CAPSULE ORAL 3 TIMES DAILY
Status: DISCONTINUED | OUTPATIENT
Start: 2024-01-29 | End: 2024-02-06 | Stop reason: HOSPADM

## 2024-01-29 RX ORDER — GLUCAGON 1 MG/ML
1 KIT INJECTION PRN
Status: DISCONTINUED | OUTPATIENT
Start: 2024-01-29 | End: 2024-02-06 | Stop reason: HOSPADM

## 2024-01-29 RX ORDER — ACETAMINOPHEN 500 MG
1000 TABLET ORAL EVERY 6 HOURS
Status: DISCONTINUED | OUTPATIENT
Start: 2024-01-29 | End: 2024-01-31

## 2024-01-29 RX ORDER — ASPIRIN 81 MG/1
81 TABLET, CHEWABLE ORAL DAILY
Status: DISCONTINUED | OUTPATIENT
Start: 2024-01-30 | End: 2024-02-06 | Stop reason: HOSPADM

## 2024-01-29 RX ORDER — VECURONIUM BROMIDE 1 MG/ML
INJECTION, POWDER, LYOPHILIZED, FOR SOLUTION INTRAVENOUS PRN
Status: DISCONTINUED | OUTPATIENT
Start: 2024-01-29 | End: 2024-01-29 | Stop reason: SDUPTHER

## 2024-01-29 RX ORDER — SODIUM CHLORIDE 0.9 % (FLUSH) 0.9 %
5-40 SYRINGE (ML) INJECTION EVERY 12 HOURS SCHEDULED
Status: DISCONTINUED | OUTPATIENT
Start: 2024-01-29 | End: 2024-01-29 | Stop reason: HOSPADM

## 2024-01-29 RX ORDER — MILRINONE LACTATE 0.2 MG/ML
.0625-.75 INJECTION, SOLUTION INTRAVENOUS CONTINUOUS
Status: DISCONTINUED | OUTPATIENT
Start: 2024-01-29 | End: 2024-01-29

## 2024-01-29 RX ORDER — CHLORHEXIDINE GLUCONATE 40 MG/ML
SOLUTION TOPICAL ONCE
Status: COMPLETED | OUTPATIENT
Start: 2024-01-29 | End: 2024-01-29

## 2024-01-29 RX ORDER — FAMOTIDINE 10 MG/ML
20 INJECTION, SOLUTION INTRAVENOUS 2 TIMES DAILY
Status: DISCONTINUED | OUTPATIENT
Start: 2024-01-29 | End: 2024-02-06 | Stop reason: HOSPADM

## 2024-01-29 RX ORDER — SENNA AND DOCUSATE SODIUM 50; 8.6 MG/1; MG/1
1 TABLET, FILM COATED ORAL 2 TIMES DAILY
Status: DISCONTINUED | OUTPATIENT
Start: 2024-01-29 | End: 2024-02-06 | Stop reason: HOSPADM

## 2024-01-29 RX ORDER — SODIUM CHLORIDE 9 MG/ML
INJECTION, SOLUTION INTRAVENOUS PRN
Status: DISCONTINUED | OUTPATIENT
Start: 2024-01-29 | End: 2024-01-29 | Stop reason: HOSPADM

## 2024-01-29 RX ORDER — ASPIRIN 325 MG
325 TABLET ORAL ONCE
Status: DISCONTINUED | OUTPATIENT
Start: 2024-01-29 | End: 2024-01-30

## 2024-01-29 RX ORDER — SODIUM CHLORIDE 0.9 % (FLUSH) 0.9 %
5-40 SYRINGE (ML) INJECTION PRN
Status: DISCONTINUED | OUTPATIENT
Start: 2024-01-29 | End: 2024-01-29 | Stop reason: HOSPADM

## 2024-01-29 RX ORDER — CHLORHEXIDINE GLUCONATE ORAL RINSE 1.2 MG/ML
15 SOLUTION DENTAL 2 TIMES DAILY
Status: DISCONTINUED | OUTPATIENT
Start: 2024-01-29 | End: 2024-02-06 | Stop reason: HOSPADM

## 2024-01-29 RX ORDER — ONDANSETRON 2 MG/ML
4 INJECTION INTRAMUSCULAR; INTRAVENOUS EVERY 6 HOURS PRN
Status: DISCONTINUED | OUTPATIENT
Start: 2024-01-29 | End: 2024-02-06 | Stop reason: HOSPADM

## 2024-01-29 RX ORDER — M-VIT,TX,IRON,MINS/CALC/FOLIC 27MG-0.4MG
1 TABLET ORAL
Status: DISCONTINUED | OUTPATIENT
Start: 2024-01-30 | End: 2024-02-06 | Stop reason: HOSPADM

## 2024-01-29 RX ORDER — ASPIRIN 300 MG/1
300 SUPPOSITORY RECTAL ONCE
Status: COMPLETED | OUTPATIENT
Start: 2024-01-29 | End: 2024-01-29

## 2024-01-29 RX ORDER — ALBUMIN, HUMAN INJ 5% 5 %
12.5 SOLUTION INTRAVENOUS PRN
Status: DISCONTINUED | OUTPATIENT
Start: 2024-01-29 | End: 2024-02-06 | Stop reason: HOSPADM

## 2024-01-29 RX ORDER — SODIUM CHLORIDE 0.9 % (FLUSH) 0.9 %
5-40 SYRINGE (ML) INJECTION EVERY 12 HOURS SCHEDULED
Status: DISCONTINUED | OUTPATIENT
Start: 2024-01-29 | End: 2024-02-06 | Stop reason: HOSPADM

## 2024-01-29 RX ORDER — MILRINONE LACTATE 0.2 MG/ML
0.15 INJECTION, SOLUTION INTRAVENOUS CONTINUOUS
Status: DISCONTINUED | OUTPATIENT
Start: 2024-01-29 | End: 2024-01-31

## 2024-01-29 RX ORDER — PROPOFOL 10 MG/ML
5-50 INJECTION, EMULSION INTRAVENOUS CONTINUOUS
Status: DISCONTINUED | OUTPATIENT
Start: 2024-01-29 | End: 2024-01-30

## 2024-01-29 RX ORDER — POLYETHYLENE GLYCOL 3350 17 G/17G
17 POWDER, FOR SOLUTION ORAL DAILY
Status: DISCONTINUED | OUTPATIENT
Start: 2024-01-29 | End: 2024-02-06 | Stop reason: HOSPADM

## 2024-01-29 RX ORDER — POTASSIUM CHLORIDE 20 MEQ/1
40 TABLET, EXTENDED RELEASE ORAL PRN
Status: DISCONTINUED | OUTPATIENT
Start: 2024-01-29 | End: 2024-02-06 | Stop reason: HOSPADM

## 2024-01-29 RX ORDER — SODIUM CHLORIDE 9 MG/ML
INJECTION, SOLUTION INTRAVENOUS CONTINUOUS
Status: DISCONTINUED | OUTPATIENT
Start: 2024-01-29 | End: 2024-01-31

## 2024-01-29 RX ORDER — PROTAMINE SULFATE 10 MG/ML
INJECTION, SOLUTION INTRAVENOUS PRN
Status: DISCONTINUED | OUTPATIENT
Start: 2024-01-29 | End: 2024-01-29 | Stop reason: SDUPTHER

## 2024-01-29 RX ORDER — NITROGLYCERIN 20 MG/100ML
INJECTION INTRAVENOUS PRN
Status: DISCONTINUED | OUTPATIENT
Start: 2024-01-29 | End: 2024-01-29 | Stop reason: SDUPTHER

## 2024-01-29 RX ORDER — TRAMADOL HYDROCHLORIDE 50 MG/1
50 TABLET ORAL EVERY 6 HOURS PRN
Status: DISCONTINUED | OUTPATIENT
Start: 2024-01-29 | End: 2024-01-30

## 2024-01-29 RX ORDER — ONDANSETRON 4 MG/1
4 TABLET, ORALLY DISINTEGRATING ORAL EVERY 8 HOURS PRN
Status: DISCONTINUED | OUTPATIENT
Start: 2024-01-29 | End: 2024-02-06 | Stop reason: HOSPADM

## 2024-01-29 RX ORDER — SODIUM CHLORIDE 9 MG/ML
INJECTION, SOLUTION INTRAVENOUS PRN
Status: DISCONTINUED | OUTPATIENT
Start: 2024-01-29 | End: 2024-02-06 | Stop reason: HOSPADM

## 2024-01-29 RX ORDER — CHLORHEXIDINE GLUCONATE ORAL RINSE 1.2 MG/ML
15 SOLUTION DENTAL ONCE
Status: COMPLETED | OUTPATIENT
Start: 2024-01-29 | End: 2024-01-29

## 2024-01-29 RX ORDER — FUROSEMIDE 10 MG/ML
40 INJECTION INTRAMUSCULAR; INTRAVENOUS ONCE
Status: COMPLETED | OUTPATIENT
Start: 2024-01-29 | End: 2024-01-29

## 2024-01-29 RX ORDER — SODIUM CHLORIDE 9 MG/ML
INJECTION, SOLUTION INTRAVENOUS CONTINUOUS PRN
Status: DISCONTINUED | OUTPATIENT
Start: 2024-01-29 | End: 2024-01-29 | Stop reason: SDUPTHER

## 2024-01-29 RX ORDER — CLOPIDOGREL BISULFATE 75 MG/1
75 TABLET ORAL DAILY
Status: DISCONTINUED | OUTPATIENT
Start: 2024-01-31 | End: 2024-02-06 | Stop reason: HOSPADM

## 2024-01-29 RX ORDER — PROPOFOL 10 MG/ML
INJECTION, EMULSION INTRAVENOUS CONTINUOUS PRN
Status: DISCONTINUED | OUTPATIENT
Start: 2024-01-29 | End: 2024-01-29 | Stop reason: SDUPTHER

## 2024-01-29 RX ORDER — GINSENG 100 MG
CAPSULE ORAL 2 TIMES DAILY
Status: DISCONTINUED | OUTPATIENT
Start: 2024-01-31 | End: 2024-02-06 | Stop reason: HOSPADM

## 2024-01-29 RX ORDER — MIDAZOLAM HYDROCHLORIDE 1 MG/ML
INJECTION INTRAMUSCULAR; INTRAVENOUS PRN
Status: DISCONTINUED | OUTPATIENT
Start: 2024-01-29 | End: 2024-01-29

## 2024-01-29 RX ORDER — FENTANYL CITRATE 50 UG/ML
25 INJECTION, SOLUTION INTRAMUSCULAR; INTRAVENOUS
Status: DISCONTINUED | OUTPATIENT
Start: 2024-01-29 | End: 2024-01-30

## 2024-01-29 RX ORDER — PROTAMINE SULFATE 10 MG/ML
50 INJECTION, SOLUTION INTRAVENOUS
Status: ACTIVE | OUTPATIENT
Start: 2024-01-29 | End: 2024-01-30

## 2024-01-29 RX ORDER — PROPOFOL 10 MG/ML
INJECTION, EMULSION INTRAVENOUS
Status: COMPLETED
Start: 2024-01-29 | End: 2024-01-29

## 2024-01-29 RX ORDER — BISACODYL 10 MG
10 SUPPOSITORY, RECTAL RECTAL DAILY PRN
Status: DISCONTINUED | OUTPATIENT
Start: 2024-01-29 | End: 2024-02-06 | Stop reason: HOSPADM

## 2024-01-29 RX ORDER — MAGNESIUM SULFATE IN WATER 40 MG/ML
2000 INJECTION, SOLUTION INTRAVENOUS PRN
Status: DISCONTINUED | OUTPATIENT
Start: 2024-01-29 | End: 2024-02-06 | Stop reason: HOSPADM

## 2024-01-29 RX ORDER — ASPIRIN 81 MG/1
81 TABLET, CHEWABLE ORAL DAILY
Status: DISCONTINUED | OUTPATIENT
Start: 2024-01-30 | End: 2024-01-29 | Stop reason: SDUPTHER

## 2024-01-29 RX ORDER — ATORVASTATIN CALCIUM 40 MG/1
40 TABLET, FILM COATED ORAL NIGHTLY
Status: DISCONTINUED | OUTPATIENT
Start: 2024-01-30 | End: 2024-02-06 | Stop reason: HOSPADM

## 2024-01-29 RX ORDER — ROCURONIUM BROMIDE 10 MG/ML
INJECTION, SOLUTION INTRAVENOUS PRN
Status: DISCONTINUED | OUTPATIENT
Start: 2024-01-29 | End: 2024-01-29 | Stop reason: SDUPTHER

## 2024-01-29 RX ORDER — HYDRALAZINE HYDROCHLORIDE 20 MG/ML
20 INJECTION INTRAMUSCULAR; INTRAVENOUS EVERY 6 HOURS PRN
Status: DISCONTINUED | OUTPATIENT
Start: 2024-02-01 | End: 2024-01-30

## 2024-01-29 RX ORDER — SODIUM CHLORIDE 0.9 % (FLUSH) 0.9 %
5-40 SYRINGE (ML) INJECTION PRN
Status: DISCONTINUED | OUTPATIENT
Start: 2024-01-29 | End: 2024-02-06 | Stop reason: HOSPADM

## 2024-01-29 RX ORDER — MIDAZOLAM HYDROCHLORIDE 1 MG/ML
INJECTION INTRAMUSCULAR; INTRAVENOUS PRN
Status: DISCONTINUED | OUTPATIENT
Start: 2024-01-29 | End: 2024-01-29 | Stop reason: SDUPTHER

## 2024-01-29 RX ORDER — PROPOFOL 10 MG/ML
INJECTION, EMULSION INTRAVENOUS PRN
Status: DISCONTINUED | OUTPATIENT
Start: 2024-01-29 | End: 2024-01-29 | Stop reason: SDUPTHER

## 2024-01-29 RX ORDER — DEXMEDETOMIDINE HYDROCHLORIDE 4 UG/ML
.1-1.5 INJECTION, SOLUTION INTRAVENOUS CONTINUOUS
Status: DISCONTINUED | OUTPATIENT
Start: 2024-01-29 | End: 2024-01-30

## 2024-01-29 RX ORDER — ENOXAPARIN SODIUM 100 MG/ML
30 INJECTION SUBCUTANEOUS 2 TIMES DAILY
Status: DISCONTINUED | OUTPATIENT
Start: 2024-01-30 | End: 2024-02-06 | Stop reason: HOSPADM

## 2024-01-29 RX ADMIN — VECURONIUM BROMIDE 5 MG: 1 INJECTION, POWDER, LYOPHILIZED, FOR SOLUTION INTRAVENOUS at 15:03

## 2024-01-29 RX ADMIN — FUROSEMIDE 40 MG: 10 INJECTION, SOLUTION INTRAMUSCULAR; INTRAVENOUS at 18:47

## 2024-01-29 RX ADMIN — SENNOSIDES, DOCUSATE SODIUM 1 TABLET: 8.6; 5 TABLET ORAL at 20:58

## 2024-01-29 RX ADMIN — SODIUM CHLORIDE: 900 INJECTION INTRAVENOUS at 13:38

## 2024-01-29 RX ADMIN — ROCURONIUM BROMIDE 100 MG: 10 INJECTION INTRAVENOUS at 13:45

## 2024-01-29 RX ADMIN — SODIUM CHLORIDE 1 MG/HR: 9 INJECTION, SOLUTION INTRAVENOUS at 17:07

## 2024-01-29 RX ADMIN — HEPARIN SODIUM 50000 UNITS: 1000 INJECTION INTRAVENOUS; SUBCUTANEOUS at 15:08

## 2024-01-29 RX ADMIN — ALBUMIN (HUMAN) 12.5 G: 12.5 INJECTION, SOLUTION INTRAVENOUS at 16:36

## 2024-01-29 RX ADMIN — SODIUM BICARBONATE 50 MEQ: 84 INJECTION, SOLUTION INTRAVENOUS at 23:57

## 2024-01-29 RX ADMIN — SODIUM CHLORIDE: 9 INJECTION, SOLUTION INTRAVENOUS at 10:57

## 2024-01-29 RX ADMIN — MILRINONE LACTATE IN DEXTROSE 0.25 MCG/KG/MIN: 200 INJECTION, SOLUTION INTRAVENOUS at 20:16

## 2024-01-29 RX ADMIN — PROPOFOL 20 MG: 10 INJECTION, EMULSION INTRAVENOUS at 14:29

## 2024-01-29 RX ADMIN — SODIUM CHLORIDE 3000 MG: 900 INJECTION INTRAVENOUS at 21:30

## 2024-01-29 RX ADMIN — NITROGLYCERIN 25 MCG: 20 INJECTION INTRAVENOUS at 17:03

## 2024-01-29 RX ADMIN — SODIUM CHLORIDE, PRESERVATIVE FREE 10 ML: 5 INJECTION INTRAVENOUS at 20:59

## 2024-01-29 RX ADMIN — SODIUM CHLORIDE 3000 MG: 900 INJECTION INTRAVENOUS at 13:50

## 2024-01-29 RX ADMIN — PROPOFOL 30 MCG/KG/MIN: 10 INJECTION, EMULSION INTRAVENOUS at 20:03

## 2024-01-29 RX ADMIN — LIDOCAINE HYDROCHLORIDE 100 MG: 20 INJECTION, SOLUTION EPIDURAL; INFILTRATION; INTRACAUDAL; PERINEURAL at 13:45

## 2024-01-29 RX ADMIN — SODIUM BICARBONATE 50 MEQ: 84 INJECTION, SOLUTION INTRAVENOUS at 18:00

## 2024-01-29 RX ADMIN — CHLORHEXIDINE GLUCONATE 0.12% ORAL RINSE 15 ML: 1.2 LIQUID ORAL at 11:03

## 2024-01-29 RX ADMIN — FENTANYL CITRATE 500 MCG: 50 INJECTION, SOLUTION INTRAMUSCULAR; INTRAVENOUS at 13:46

## 2024-01-29 RX ADMIN — MIDAZOLAM 4 MG: 1 INJECTION INTRAMUSCULAR; INTRAVENOUS at 13:45

## 2024-01-29 RX ADMIN — HEPARIN SODIUM: 1000 INJECTION INTRAVENOUS; SUBCUTANEOUS at 14:00

## 2024-01-29 RX ADMIN — POTASSIUM CHLORIDE 20 MEQ: 29.8 INJECTION, SOLUTION INTRAVENOUS at 19:45

## 2024-01-29 RX ADMIN — MIDAZOLAM 2 MG: 1 INJECTION INTRAMUSCULAR; INTRAVENOUS at 15:29

## 2024-01-29 RX ADMIN — PROPOFOL 50 MG: 10 INJECTION, EMULSION INTRAVENOUS at 13:45

## 2024-01-29 RX ADMIN — PROTAMINE SULFATE 400 MG: 10 INJECTION, SOLUTION INTRAVENOUS at 16:32

## 2024-01-29 RX ADMIN — MUPIROCIN: 20 OINTMENT TOPICAL at 20:58

## 2024-01-29 RX ADMIN — ASPIRIN 300 MG: 300 SUPPOSITORY RECTAL at 20:58

## 2024-01-29 RX ADMIN — SODIUM CHLORIDE: 9 INJECTION, SOLUTION INTRAVENOUS at 18:15

## 2024-01-29 RX ADMIN — FENTANYL CITRATE 25 MCG: 50 INJECTION, SOLUTION INTRAMUSCULAR; INTRAVENOUS at 19:57

## 2024-01-29 RX ADMIN — Medication 1 MCG/MIN: at 19:41

## 2024-01-29 RX ADMIN — METOPROLOL TARTRATE 12.5 MG: 25 TABLET, FILM COATED ORAL at 11:03

## 2024-01-29 RX ADMIN — FAMOTIDINE 20 MG: 10 INJECTION, SOLUTION INTRAVENOUS at 20:58

## 2024-01-29 RX ADMIN — CHLORHEXIDINE GLUCONATE 118 ML: 4 SOLUTION TOPICAL at 10:57

## 2024-01-29 RX ADMIN — ALBUMIN (HUMAN) 12.5 G: 12.5 INJECTION, SOLUTION INTRAVENOUS at 22:59

## 2024-01-29 RX ADMIN — POTASSIUM CHLORIDE 20 MEQ: 29.8 INJECTION, SOLUTION INTRAVENOUS at 23:00

## 2024-01-29 RX ADMIN — ACETAMINOPHEN 1000 MG: 500 TABLET ORAL at 23:00

## 2024-01-29 RX ADMIN — NITROGLYCERIN 25 MCG: 20 INJECTION INTRAVENOUS at 17:07

## 2024-01-29 RX ADMIN — PROTAMINE SULFATE 25 MG: 10 INJECTION, SOLUTION INTRAVENOUS at 16:41

## 2024-01-29 RX ADMIN — CHLORHEXIDINE GLUCONATE 0.12% ORAL RINSE 15 ML: 1.2 LIQUID ORAL at 20:58

## 2024-01-29 RX ADMIN — SODIUM CHLORIDE 6 UNITS/HR: 9 INJECTION, SOLUTION INTRAVENOUS at 16:08

## 2024-01-29 RX ADMIN — POTASSIUM CHLORIDE 20 MEQ: 29.8 INJECTION, SOLUTION INTRAVENOUS at 21:30

## 2024-01-29 RX ADMIN — PROPOFOL 20 MCG/KG/MIN: 10 INJECTION, EMULSION INTRAVENOUS at 17:01

## 2024-01-29 RX ADMIN — GABAPENTIN 300 MG: 300 CAPSULE ORAL at 20:58

## 2024-01-29 RX ADMIN — MUPIROCIN: 20 OINTMENT TOPICAL at 11:03

## 2024-01-29 RX ADMIN — VECURONIUM BROMIDE 5 MG: 1 INJECTION, POWDER, LYOPHILIZED, FOR SOLUTION INTRAVENOUS at 14:29

## 2024-01-29 RX ADMIN — HEPARIN SODIUM 5000 UNITS: 1000 INJECTION INTRAVENOUS; SUBCUTANEOUS at 15:18

## 2024-01-29 RX ADMIN — PHENYLEPHRINE HYDROCHLORIDE 25 MCG/MIN: 10 INJECTION INTRAVENOUS at 22:46

## 2024-01-29 RX ADMIN — FENTANYL CITRATE 500 MCG: 50 INJECTION, SOLUTION INTRAMUSCULAR; INTRAVENOUS at 13:45

## 2024-01-29 RX ADMIN — AMINOCAPROIC ACID 5 G/HR: 250 INJECTION, SOLUTION INTRAVENOUS at 14:33

## 2024-01-29 ASSESSMENT — PAIN - FUNCTIONAL ASSESSMENT: PAIN_FUNCTIONAL_ASSESSMENT: 0-10

## 2024-01-29 ASSESSMENT — PULMONARY FUNCTION TESTS
PIF_VALUE: 24
PIF_VALUE: 20
PIF_VALUE: 29
PIF_VALUE: 21
PIF_VALUE: 27
PIF_VALUE: 24
PIF_VALUE: 29
PIF_VALUE: 22
PIF_VALUE: 27
PIF_VALUE: 32
PIF_VALUE: 22
PIF_VALUE: 26
PIF_VALUE: 24
PIF_VALUE: 30
PIF_VALUE: 22
PIF_VALUE: 24
PIF_VALUE: 30
PIF_VALUE: 26
PIF_VALUE: 28
PIF_VALUE: 23
PIF_VALUE: 26
PIF_VALUE: 22

## 2024-01-29 NOTE — ANESTHESIA PROCEDURE NOTES
Arterial Line:    An arterial line was placed using surface landmarks, in the OR for the following indication(s): continuous blood pressure monitoring and blood sampling needed.    A 20 gauge (size), 1 and 3/8 inch (length), Angiocath (type) catheter was placed, Seldinger technique not used, into the radial artery, secured by tape and Tegaderm.  Anesthesia type: Local    Events:  patient tolerated procedure well with no complications and EBL < 5mL.1/29/2024 1:45 PM1/29/2024 1:50 PM  Anesthesiologist: Jacinto Dial MD  Resident/CRNA: Carlos Ohara APRN - CRNA  Preanesthetic Checklist  Completed: patient identified, IV checked, site marked, risks and benefits discussed, surgical/procedural consents, equipment checked, pre-op evaluation, timeout performed, anesthesia consent given, oxygen available and monitors applied/VS acknowledged

## 2024-01-29 NOTE — ANESTHESIA POSTPROCEDURE EVALUATION
Department of Anesthesiology  Postprocedure Note    Patient: Jose Figueroa  MRN: [id number]  YOB: 1965  Date of evaluation: 1/29/2024    Procedure Summary       Date: 01/29/24 Room / Location: [address]    Anesthesia Start: 1337 Anesthesia Stop: 1806    Procedure: CORONARY ARTERY BYPASS GRAFT X2, LIMA - LAD, SVG - PDA; LEFT ENDOSCOPIC GREATER SAPHENOUS VEIN HARVEST; INTRAOPERATIVE TRANSESOPHAGEAL ECHOCARDIOGRAM (Chest) Diagnosis:       Coronary artery disease, unspecified vessel or lesion type, unspecified whether angina present, unspecified whether native or transplanted heart      (Coronary artery disease, unspecified vessel or lesion type, unspecified whether angina present, unspecified whether native or transplanted heart [I25.10])    Surgeons: Kaden Trejo MD Responsible Provider: Jacinto Dial MD    Anesthesia Type: General ASA Status: 4            Anesthesia Type: General    Phuong Phase I: Phuong Score: 10    Phuong Phase II:      Anesthesia Post Evaluation    Patient location during evaluation: ICU  Patient participation: waiting for patient participation  Level of consciousness: sedated and ventilated  Airway patency: patent  Nausea & Vomiting: no nausea and no vomiting  Cardiovascular status: hemodynamically stable  Respiratory status: ventilator and intubated  Hydration status: euvolemic  Pain management: adequate    No notable events documented.

## 2024-01-29 NOTE — ANESTHESIA PROCEDURE NOTES
Central Venous Line:    A central venous line was placed using ultrasound guidance, in the OR for the following indication(s): central venous access.1/29/2024 1:48 PM1/29/2024 1:55 PM    Sterility preparation included the following: hand hygiene performed prior to procedure, maximum sterile barriers used and sterile technique used to drape from head to toe.    The patient was placed in Trendelenburg position.The right internal jugular vein was prepped.    The site was prepped with Chloraprep.  A 7 Fr (size), 16 (length), introducer triple lumen was placed.    During the procedure, the following specific steps were taken: target vein identified, needle advanced into vein and blood aspirated and guidewire advanced into vein.    Intravenous verification was obtained by ultrasound, venous blood return and manometry.    Post insertion care included: all ports aspirated, all ports flushed easily, guidewire removed intact, Biopatch applied, line sutured in place and dressing applied.    During the procedure the patient experienced: patient tolerated procedure well with no complications, EBL 0mL and EBL < 5mL.      Insertion site scrubbed per usage guidelines?: Yes  Skin prep agent dried for 3 minutes prior to procedure?:yes  Outcomes: uncomplicated  Real-time US image taken/store: yes  Anesthesia type: general  Staffing  Performed: Anesthesiologist   Anesthesiologist: Jacinto Dial MD  Performed by: Luis Felipe Cain APRN - CRNA  Authorized by: Jacinto Dial MD    Preanesthetic Checklist  Completed: patient identified, IV checked, site marked, risks and benefits discussed, surgical/procedural consents, equipment checked, pre-op evaluation, timeout performed, anesthesia consent given, oxygen available, monitors applied/VS acknowledged, fire risk safety assessment completed and verbalized and blood product R/B/A discussed and consented

## 2024-01-29 NOTE — BRIEF OP NOTE
Brief Postoperative Note      Patient: Jose Figueroa  YOB: 1965  MRN: [id number]    Date of Procedure: 1/29/2024    Pre-Op Diagnosis Codes:     * Coronary artery disease, unspecified vessel or lesion type, unspecified whether angina present, unspecified whether native or transplanted heart [I25.10]    Post-Op Diagnosis: Same       Procedure(s):  CORONARY ARTERY BYPASS GRAFT X2, LIMA - LAD, SVG - PDA; LEFT ENDOSCOPIC GREATER SAPHENOUS VEIN HARVEST; INTRAOPERATIVE TRANSESOPHAGEAL ECHOCARDIOGRAM    Surgeon(s):  Kaden Trejo MD    Assistant:  Surgical Assistant: Iva Martinez  Physician Assistant: Abdelrahman Tillman PA-C    Anesthesia: General    Estimated Blood Loss (mL): 400    Complications: None    Specimens:   * No specimens in log *    Implants:  Implant Name Type Inv. Item Serial No.  Lot No. LRB No. Used Action   CLIP INT L ORNG TI TRNSVRS GRV CHEVRON SHP W/ PRECIS TIP TO - [id number]  CLIP INT L ORNG TI TRNSVRS GRV CHEVRON SHP W/ PRECIS TIP TO  [employer] [id number] N/A 1 Implanted         Drains:   Chest Tube Left Pleural 1 (Active)   Dressing Status New dressing applied;Clean, dry & intact 01/29/24 1654   Chest Tube Dressing Split Gauze 01/29/24 1654       Chest Tube Mediastinal 2 (Active)   Dressing Status New dressing applied;Clean, dry & intact 01/29/24 1654   Chest Tube Dressing Split Gauze 01/29/24 1654       Urinary Catheter 01/29/24 Stanton-Temperature (Active)   $ Urethral catheter insertion Inserted for procedure 01/29/24 1355   Catheter Indications Perioperative use for selected surgical procedures 01/29/24 1355   Site Assessment No urethral drainage 01/29/24 1355   Urine Color Yellow 01/29/24 1355   Urine Appearance Clear 01/29/24 1355   Collection Container Standard 01/29/24 1355   Securement Method Tape 01/29/24 1355   Catheter Care  Perineal wipes 01/29/24 1355   Catheter Best Practices  Catheter secured to thigh;Tamper seal intact;Bag not on floor;Lack of dependent

## 2024-01-29 NOTE — CONSENT
Informed Consent for Blood Component Transfusion Note    I have discussed with the patient the rationale for blood component transfusion; its benefits in treating or preventing fatigue, organ damage, or death; and its risk which includes mild transfusion reactions, rare risk of blood borne infection, or more serious but rare reactions. I have discussed the alternatives to transfusion, including the risk and consequences of not receiving transfusion. The patient had an opportunity to ask questions and had agreed to proceed with transfusion of blood components.    Electronically signed by Kaden Trejo MD on 1/29/24 at 11:56 AM EST

## 2024-01-29 NOTE — ANESTHESIA PROCEDURE NOTES
Procedure Performed: GIOVANNA       Start Time:  1/29/2024 1:58 PM       End Time:   1/29/2024 2:08 PM    Preanesthesia Checklist:  Patient identified, IV assessed, risks and benefits discussed, monitors and equipment assessed, procedure being performed at surgeon's request and anesthesia consent obtained.    General Procedure Information  Diagnostic Indications for Echo:  assessment of surgical repair, hemodynamic monitoring and assessment of valve function  Physician Requesting Echo: Kaden Trejo MD  Location performed:  OR  Intubated  Heart visualized  Probe Insertion:  Easy  Probe Type:  Mulitplane  Modalities:  2D, color flow mapping, contrast, pulse wave Doppler and M-mode

## 2024-01-29 NOTE — PROGRESS NOTES
Patients home care nurse called to update his medication list Jyoti quintana at [facility]

## 2024-01-29 NOTE — INTERVAL H&P NOTE
Update History & Physical    The patient's History and Physical of January 22, 2024 was reviewed with the patient and I examined the patient. There was no change. The surgical site was confirmed by the patient and me.     Plan: The risks, benefits, expected outcome, and alternative to the recommended procedure have been discussed with the patient. Patient understands and wants to proceed with the procedure.     Electronically signed by Kaden Trejo MD on 1/29/2024 at 11:56 AM

## 2024-01-30 ENCOUNTER — APPOINTMENT (OUTPATIENT)
Dept: GENERAL RADIOLOGY | Age: 59
DRG: 166 | End: 2024-01-30
Attending: THORACIC SURGERY (CARDIOTHORACIC VASCULAR SURGERY)
Payer: COMMERCIAL

## 2024-01-30 LAB
ANION GAP SERPL CALCULATED.3IONS-SCNC: 12 MMOL/L (ref 7–16)
ANION GAP SERPL CALCULATED.3IONS-SCNC: 16 MMOL/L (ref 7–16)
ANION GAP SERPL CALCULATED.3IONS-SCNC: 17 MMOL/L (ref 7–16)
APTT: 26 SECONDS (ref 25.1–37.1)
BASE EXCESS MIXED: 1.3 (ref 0–3)
BASE EXCESS MIXED: 2.1 (ref 0–3)
BASE EXCESS MIXED: 2.4 (ref 0–3)
BASE EXCESS MIXED: 2.7 (ref 0–3)
BASE EXCESS MIXED: 2.8 (ref 0–3)
BASE EXCESS MIXED: 3.4 (ref 0–3)
BASE EXCESS MIXED: 3.7 (ref 0–3)
BASE EXCESS MIXED: 3.7 (ref 0–3)
BASE EXCESS MIXED: 4.6 (ref 0–3)
BASE EXCESS MIXED: 5 (ref 0–3)
BASE EXCESS: ABNORMAL (ref 0–3)
BUN SERPL-MCNC: 13 MG/DL (ref 6–23)
BUN SERPL-MCNC: 15 MG/DL (ref 6–23)
BUN SERPL-MCNC: 16 MG/DL (ref 6–23)
CALCIUM IONIZED: 4.2 MG/DL (ref 4.48–5.28)
CALCIUM IONIZED: 4.28 MG/DL (ref 4.48–5.28)
CALCIUM IONIZED: 4.68 MG/DL (ref 4.48–5.28)
CALCIUM SERPL-MCNC: 7.2 MG/DL (ref 8.3–10.6)
CALCIUM SERPL-MCNC: 7.8 MG/DL (ref 8.3–10.6)
CALCIUM SERPL-MCNC: 8.2 MG/DL (ref 8.3–10.6)
CHLORIDE BLD-SCNC: 101 MMOL/L (ref 99–110)
CHLORIDE BLD-SCNC: 104 MMOL/L (ref 99–110)
CHLORIDE BLD-SCNC: 107 MMOL/L (ref 99–110)
CO2: 20 MMOL/L (ref 21–32)
CO2: 20 MMOL/L (ref 21–32)
CO2: 21 MMOL/L (ref 21–32)
CO2: 22 MMOL/L (ref 21–32)
CO2: 23 MMOL/L (ref 21–32)
CO2: 24 MMOL/L (ref 21–32)
CO2: 25 MMOL/L (ref 21–32)
CO2: 25 MMOL/L (ref 21–32)
CREAT SERPL-MCNC: 1.2 MG/DL (ref 0.9–1.3)
CREAT SERPL-MCNC: 1.4 MG/DL (ref 0.9–1.3)
CREAT SERPL-MCNC: 1.6 MG/DL (ref 0.9–1.3)
EGFR, POC: 59 ML/MIN/1.73M2
EGFR, POC: 59 ML/MIN/1.73M2
EGFR, POC: >60 ML/MIN/1.73M2
FIBRINOGEN LEVEL: 374 MG/DL (ref 170–540)
GFR SERPL CREATININE-BSD FRML MDRD: 50 ML/MIN/1.73M2
GFR SERPL CREATININE-BSD FRML MDRD: 59 ML/MIN/1.73M2
GFR SERPL CREATININE-BSD FRML MDRD: >60 ML/MIN/1.73M2
GLUCOSE BLD-MCNC: 103 MG/DL (ref 70–99)
GLUCOSE BLD-MCNC: 109 MG/DL (ref 70–99)
GLUCOSE BLD-MCNC: 113 MG/DL (ref 70–99)
GLUCOSE BLD-MCNC: 114 MG/DL (ref 70–99)
GLUCOSE BLD-MCNC: 123 MG/DL (ref 70–99)
GLUCOSE BLD-MCNC: 127 MG/DL (ref 70–99)
GLUCOSE BLD-MCNC: 129 MG/DL (ref 70–99)
GLUCOSE BLD-MCNC: 130 MG/DL (ref 70–99)
GLUCOSE BLD-MCNC: 135 MG/DL (ref 70–99)
GLUCOSE BLD-MCNC: 144 MG/DL (ref 70–99)
GLUCOSE BLD-MCNC: 150 MG/DL (ref 70–99)
GLUCOSE BLD-MCNC: 156 MG/DL (ref 70–99)
GLUCOSE BLD-MCNC: 165 MG/DL (ref 70–99)
GLUCOSE BLD-MCNC: 176 MG/DL (ref 70–99)
GLUCOSE BLD-MCNC: 81 MG/DL (ref 70–99)
GLUCOSE BLD-MCNC: 95 MG/DL (ref 70–99)
GLUCOSE BLD-MCNC: 97 MG/DL (ref 70–99)
GLUCOSE SERPL-MCNC: 106 MG/DL (ref 70–99)
GLUCOSE SERPL-MCNC: 149 MG/DL (ref 70–99)
GLUCOSE SERPL-MCNC: 187 MG/DL (ref 70–99)
HCO3 ARTERIAL: 20.2 MMOL/L (ref 21–28)
HCO3 ARTERIAL: 20.8 MMOL/L (ref 21–28)
HCO3 ARTERIAL: 21.3 MMOL/L (ref 21–28)
HCO3 ARTERIAL: 21.6 MMOL/L (ref 21–28)
HCO3 ARTERIAL: 21.7 MMOL/L (ref 21–28)
HCO3 ARTERIAL: 22.2 MMOL/L (ref 21–28)
HCO3 ARTERIAL: 22.5 MMOL/L (ref 21–28)
HCO3 ARTERIAL: 22.6 MMOL/L (ref 21–28)
HCO3 ARTERIAL: 23.1 MMOL/L (ref 21–28)
HCO3 ARTERIAL: 23.9 MMOL/L (ref 21–28)
HCT VFR BLD CALC: 30 % (ref 38–51)
HCT VFR BLD CALC: 31 % (ref 38–51)
HCT VFR BLD CALC: 31 % (ref 38–51)
HCT VFR BLD CALC: 32 % (ref 38–51)
HCT VFR BLD CALC: 32 % (ref 38–51)
HCT VFR BLD CALC: 32 % (ref 42–52)
HCT VFR BLD CALC: 32.5 % (ref 42–52)
HCT VFR BLD CALC: 33 % (ref 38–51)
HCT VFR BLD CALC: 34 % (ref 38–51)
HEMOGLOBIN: 10.1 GM/DL (ref 12–17)
HEMOGLOBIN: 10.5 GM/DL (ref 12–17)
HEMOGLOBIN: 10.5 GM/DL (ref 13.5–18)
HEMOGLOBIN: 10.6 GM/DL (ref 12–17)
HEMOGLOBIN: 10.7 GM/DL (ref 13.5–18)
HEMOGLOBIN: 10.8 GM/DL (ref 12–17)
HEMOGLOBIN: 10.9 GM/DL (ref 12–17)
HEMOGLOBIN: 11.1 GM/DL (ref 12–17)
HEMOGLOBIN: 11.5 GM/DL (ref 12–17)
INR BLD: 1.1 INDEX
IONIZED CA: 1.05 MMOL/L (ref 1.12–1.32)
IONIZED CA: 1.07 MMOL/L (ref 1.12–1.32)
IONIZED CA: 1.17 MMOL/L (ref 1.12–1.32)
MAGNESIUM: 2.1 MG/DL (ref 1.8–2.4)
MAGNESIUM: 2.3 MG/DL (ref 1.8–2.4)
MAGNESIUM: 2.4 MG/DL (ref 1.8–2.4)
MCH RBC QN AUTO: 28.4 PG (ref 27–31)
MCH RBC QN AUTO: 28.5 PG (ref 27–31)
MCHC RBC AUTO-ENTMCNC: 32.8 % (ref 32–36)
MCHC RBC AUTO-ENTMCNC: 32.9 % (ref 32–36)
MCV RBC AUTO: 86.2 FL (ref 78–100)
MCV RBC AUTO: 87 FL (ref 78–100)
O2 SATURATION: 92.5 % (ref 94–98)
O2 SATURATION: 93.6 % (ref 94–98)
O2 SATURATION: 93.7 % (ref 94–98)
O2 SATURATION: 93.9 % (ref 94–98)
O2 SATURATION: 94.6 % (ref 94–98)
O2 SATURATION: 94.8 % (ref 94–98)
O2 SATURATION: 94.8 % (ref 94–98)
O2 SATURATION: 95 % (ref 94–98)
O2 SATURATION: 95.4 % (ref 94–98)
O2 SATURATION: 95.7 % (ref 94–98)
PCO2 ARTERIAL: 37.1 MMHG (ref 35–48)
PCO2 ARTERIAL: 37.3 MMHG (ref 35–48)
PCO2 ARTERIAL: 37.3 MMHG (ref 35–48)
PCO2 ARTERIAL: 38 MMHG (ref 35–48)
PCO2 ARTERIAL: 38.1 MMHG (ref 35–48)
PCO2 ARTERIAL: 38.6 MMHG (ref 35–48)
PCO2 ARTERIAL: 38.8 MMHG (ref 35–48)
PCO2 ARTERIAL: 39.2 MMHG (ref 35–48)
PCO2 ARTERIAL: 41.4 MMHG (ref 35–48)
PCO2 ARTERIAL: 43.3 MMHG (ref 35–48)
PDW BLD-RTO: 11.9 % (ref 11.7–14.9)
PDW BLD-RTO: 12.1 % (ref 11.7–14.9)
PH BLOOD: 7.33 (ref 7.35–7.45)
PH BLOOD: 7.34 (ref 7.35–7.45)
PH BLOOD: 7.34 (ref 7.35–7.45)
PH BLOOD: 7.35 (ref 7.35–7.45)
PH BLOOD: 7.36 (ref 7.35–7.45)
PH BLOOD: 7.36 (ref 7.35–7.45)
PH BLOOD: 7.37 (ref 7.35–7.45)
PH BLOOD: 7.37 (ref 7.35–7.45)
PH BLOOD: 7.38 (ref 7.35–7.45)
PH BLOOD: 7.39 (ref 7.35–7.45)
PHOSPHORUS: 1.6 MG/DL (ref 2.5–4.9)
PHOSPHORUS: 2.4 MG/DL (ref 2.5–4.9)
PHOSPHORUS: 4.2 MG/DL (ref 2.5–4.9)
PLATELET # BLD: 214 K/CU MM (ref 140–440)
PLATELET # BLD: 284 K/CU MM (ref 140–440)
PMV BLD AUTO: 9.4 FL (ref 7.5–11.1)
PMV BLD AUTO: 9.8 FL (ref 7.5–11.1)
PO2 ARTERIAL: 68.5 MMHG (ref 83–108)
PO2 ARTERIAL: 71.9 MMHG (ref 83–108)
PO2 ARTERIAL: 72.4 MMHG (ref 83–108)
PO2 ARTERIAL: 72.6 MMHG (ref 83–108)
PO2 ARTERIAL: 74.7 MMHG (ref 83–108)
PO2 ARTERIAL: 76.1 MMHG (ref 83–108)
PO2 ARTERIAL: 76.8 MMHG (ref 83–108)
PO2 ARTERIAL: 78.9 MMHG (ref 83–108)
PO2 ARTERIAL: 82.2 MMHG (ref 83–108)
PO2 ARTERIAL: 83.2 MMHG (ref 83–108)
POC CALCIUM: 1.13 MMOL/L (ref 1.15–1.33)
POC CALCIUM: 1.15 MMOL/L (ref 1.15–1.33)
POC CALCIUM: 1.2 MMOL/L (ref 1.15–1.33)
POC CALCIUM: 1.21 MMOL/L (ref 1.15–1.33)
POC CHLORIDE: 108 MMOL/L (ref 99–100)
POC CHLORIDE: 109 MMOL/L (ref 99–100)
POC CHLORIDE: 111 MMOL/L (ref 99–100)
POC CREATININE: 1.2 MG/DL (ref 0.5–1.2)
POC CREATININE: 1.3 MG/DL (ref 0.5–1.2)
POC CREATININE: 1.4 MG/DL (ref 0.5–1.2)
POC CREATININE: 1.4 MG/DL (ref 0.5–1.2)
POTASSIUM SERPL-SCNC: 4 MMOL/L (ref 3.5–5.1)
POTASSIUM SERPL-SCNC: 4.1 MMOL/L (ref 3.5–5.1)
POTASSIUM SERPL-SCNC: 4.2 MMOL/L (ref 3.5–5.1)
POTASSIUM SERPL-SCNC: 4.2 MMOL/L (ref 3.5–5.1)
POTASSIUM SERPL-SCNC: 4.3 MMOL/L (ref 3.5–5.1)
POTASSIUM SERPL-SCNC: 4.4 MMOL/L (ref 3.5–5.1)
PROTHROMBIN TIME: 14.2 SECONDS (ref 11.7–14.5)
RBC # BLD: 3.68 M/CU MM (ref 4.6–6.2)
RBC # BLD: 3.77 M/CU MM (ref 4.6–6.2)
SODIUM BLD-SCNC: 138 MMOL/L (ref 135–145)
SODIUM BLD-SCNC: 141 MMOL/L (ref 135–145)
SODIUM BLD-SCNC: 142 MMOL/L (ref 135–145)
SODIUM BLD-SCNC: 143 MMOL/L (ref 135–145)
SODIUM BLD-SCNC: 143 MMOL/L (ref 135–145)
SODIUM BLD-SCNC: 144 MMOL/L (ref 135–145)
SODIUM BLD-SCNC: 144 MMOL/L (ref 135–145)
SODIUM BLD-SCNC: 145 MMOL/L (ref 135–145)
SOURCE, BLOOD GAS: ABNORMAL
WBC # BLD: 21.1 K/CU MM (ref 4–10.5)
WBC # BLD: 21.7 K/CU MM (ref 4–10.5)

## 2024-01-30 PROCEDURE — 80051 ELECTROLYTE PANEL: CPT

## 2024-01-30 PROCEDURE — P9045 ALBUMIN (HUMAN), 5%, 250 ML: HCPCS | Performed by: PHYSICIAN ASSISTANT

## 2024-01-30 PROCEDURE — 97166 OT EVAL MOD COMPLEX 45 MIN: CPT

## 2024-01-30 PROCEDURE — 97530 THERAPEUTIC ACTIVITIES: CPT

## 2024-01-30 PROCEDURE — 6360000002 HC RX W HCPCS: Performed by: PHYSICIAN ASSISTANT

## 2024-01-30 PROCEDURE — 82565 ASSAY OF CREATININE: CPT

## 2024-01-30 PROCEDURE — 94150 VITAL CAPACITY TEST: CPT

## 2024-01-30 PROCEDURE — 94761 N-INVAS EAR/PLS OXIMETRY MLT: CPT

## 2024-01-30 PROCEDURE — 6360000002 HC RX W HCPCS: Performed by: THORACIC SURGERY (CARDIOTHORACIC VASCULAR SURGERY)

## 2024-01-30 PROCEDURE — 80048 BASIC METABOLIC PNL TOTAL CA: CPT

## 2024-01-30 PROCEDURE — 6370000000 HC RX 637 (ALT 250 FOR IP): Performed by: PHYSICIAN ASSISTANT

## 2024-01-30 PROCEDURE — 2580000003 HC RX 258: Performed by: PHYSICIAN ASSISTANT

## 2024-01-30 PROCEDURE — 2100000000 HC CCU R&B

## 2024-01-30 PROCEDURE — 85610 PROTHROMBIN TIME: CPT

## 2024-01-30 PROCEDURE — 97163 PT EVAL HIGH COMPLEX 45 MIN: CPT

## 2024-01-30 PROCEDURE — 94640 AIRWAY INHALATION TREATMENT: CPT

## 2024-01-30 PROCEDURE — 85027 COMPLETE CBC AUTOMATED: CPT

## 2024-01-30 PROCEDURE — 85018 HEMOGLOBIN: CPT

## 2024-01-30 PROCEDURE — 85014 HEMATOCRIT: CPT

## 2024-01-30 PROCEDURE — 6370000000 HC RX 637 (ALT 250 FOR IP): Performed by: THORACIC SURGERY (CARDIOTHORACIC VASCULAR SURGERY)

## 2024-01-30 PROCEDURE — 2500000003 HC RX 250 WO HCPCS: Performed by: PHYSICIAN ASSISTANT

## 2024-01-30 PROCEDURE — 99254 IP/OBS CNSLTJ NEW/EST MOD 60: CPT | Performed by: INTERNAL MEDICINE

## 2024-01-30 PROCEDURE — 83735 ASSAY OF MAGNESIUM: CPT

## 2024-01-30 PROCEDURE — 2700000000 HC OXYGEN THERAPY PER DAY

## 2024-01-30 PROCEDURE — 85730 THROMBOPLASTIN TIME PARTIAL: CPT

## 2024-01-30 PROCEDURE — 97116 GAIT TRAINING THERAPY: CPT

## 2024-01-30 PROCEDURE — 85384 FIBRINOGEN ACTIVITY: CPT

## 2024-01-30 PROCEDURE — 82962 GLUCOSE BLOOD TEST: CPT

## 2024-01-30 PROCEDURE — 82330 ASSAY OF CALCIUM: CPT

## 2024-01-30 PROCEDURE — 71045 X-RAY EXAM CHEST 1 VIEW: CPT

## 2024-01-30 PROCEDURE — 6360000002 HC RX W HCPCS

## 2024-01-30 PROCEDURE — 84100 ASSAY OF PHOSPHORUS: CPT

## 2024-01-30 PROCEDURE — 82803 BLOOD GASES ANY COMBINATION: CPT

## 2024-01-30 RX ORDER — FUROSEMIDE 10 MG/ML
40 INJECTION INTRAMUSCULAR; INTRAVENOUS ONCE
Status: COMPLETED | OUTPATIENT
Start: 2024-01-30 | End: 2024-01-30

## 2024-01-30 RX ORDER — PROPOFOL 10 MG/ML
INJECTION, EMULSION INTRAVENOUS
Status: COMPLETED
Start: 2024-01-30 | End: 2024-01-30

## 2024-01-30 RX ORDER — OXYCODONE HYDROCHLORIDE 5 MG/1
5 TABLET ORAL EVERY 6 HOURS PRN
Status: DISCONTINUED | OUTPATIENT
Start: 2024-01-30 | End: 2024-02-05

## 2024-01-30 RX ORDER — MORPHINE SULFATE 2 MG/ML
2 INJECTION, SOLUTION INTRAMUSCULAR; INTRAVENOUS ONCE
Status: COMPLETED | OUTPATIENT
Start: 2024-01-30 | End: 2024-01-30

## 2024-01-30 RX ORDER — HYDRALAZINE HYDROCHLORIDE 20 MG/ML
10 INJECTION INTRAMUSCULAR; INTRAVENOUS EVERY 6 HOURS PRN
Status: DISCONTINUED | OUTPATIENT
Start: 2024-01-30 | End: 2024-02-06 | Stop reason: HOSPADM

## 2024-01-30 RX ADMIN — MILRINONE LACTATE IN DEXTROSE 0.15 MCG/KG/MIN: 200 INJECTION, SOLUTION INTRAVENOUS at 20:29

## 2024-01-30 RX ADMIN — ASPIRIN 81 MG 81 MG: 81 TABLET ORAL at 09:56

## 2024-01-30 RX ADMIN — GABAPENTIN 300 MG: 300 CAPSULE ORAL at 09:54

## 2024-01-30 RX ADMIN — TRAMADOL HYDROCHLORIDE 50 MG: 50 TABLET, COATED ORAL at 17:16

## 2024-01-30 RX ADMIN — FAMOTIDINE 20 MG: 20 TABLET ORAL at 09:55

## 2024-01-30 RX ADMIN — ALBUMIN (HUMAN) 12.5 G: 12.5 INJECTION, SOLUTION INTRAVENOUS at 09:21

## 2024-01-30 RX ADMIN — ACETAMINOPHEN 1000 MG: 500 TABLET ORAL at 06:01

## 2024-01-30 RX ADMIN — FAMOTIDINE 20 MG: 20 TABLET ORAL at 20:27

## 2024-01-30 RX ADMIN — ACETAMINOPHEN 1000 MG: 500 TABLET ORAL at 23:00

## 2024-01-30 RX ADMIN — SENNOSIDES, DOCUSATE SODIUM 1 TABLET: 8.6; 5 TABLET ORAL at 09:58

## 2024-01-30 RX ADMIN — SODIUM CHLORIDE, PRESERVATIVE FREE 10 ML: 5 INJECTION INTRAVENOUS at 20:28

## 2024-01-30 RX ADMIN — TRAMADOL HYDROCHLORIDE 50 MG: 50 TABLET, COATED ORAL at 10:56

## 2024-01-30 RX ADMIN — METOPROLOL TARTRATE 12.5 MG: 25 TABLET, FILM COATED ORAL at 17:08

## 2024-01-30 RX ADMIN — SODIUM CHLORIDE 3000 MG: 900 INJECTION INTRAVENOUS at 15:11

## 2024-01-30 RX ADMIN — CALCIUM CHLORIDE 1000 MG: 100 INJECTION, SOLUTION INTRAVENOUS at 22:46

## 2024-01-30 RX ADMIN — ALBUMIN (HUMAN) 12.5 G: 12.5 INJECTION, SOLUTION INTRAVENOUS at 08:02

## 2024-01-30 RX ADMIN — METOPROLOL TARTRATE 12.5 MG: 25 TABLET, FILM COATED ORAL at 15:25

## 2024-01-30 RX ADMIN — PHENYLEPHRINE HYDROCHLORIDE 100 MCG/MIN: 10 INJECTION INTRAVENOUS at 10:05

## 2024-01-30 RX ADMIN — IPRATROPIUM BROMIDE AND ALBUTEROL SULFATE 1 DOSE: 2.5; .5 SOLUTION RESPIRATORY (INHALATION) at 03:23

## 2024-01-30 RX ADMIN — FENTANYL CITRATE 25 MCG: 50 INJECTION, SOLUTION INTRAMUSCULAR; INTRAVENOUS at 00:00

## 2024-01-30 RX ADMIN — PROPOFOL 20 MCG/KG/MIN: 10 INJECTION, EMULSION INTRAVENOUS at 00:48

## 2024-01-30 RX ADMIN — IPRATROPIUM BROMIDE AND ALBUTEROL SULFATE 1 DOSE: 2.5; .5 SOLUTION RESPIRATORY (INHALATION) at 08:25

## 2024-01-30 RX ADMIN — POTASSIUM CHLORIDE 20 MEQ: 29.8 INJECTION, SOLUTION INTRAVENOUS at 00:58

## 2024-01-30 RX ADMIN — ACETAMINOPHEN 1000 MG: 500 TABLET ORAL at 11:24

## 2024-01-30 RX ADMIN — POTASSIUM CHLORIDE 20 MEQ: 29.8 INJECTION, SOLUTION INTRAVENOUS at 06:02

## 2024-01-30 RX ADMIN — IPRATROPIUM BROMIDE AND ALBUTEROL SULFATE 1 DOSE: 2.5; .5 SOLUTION RESPIRATORY (INHALATION) at 19:50

## 2024-01-30 RX ADMIN — SODIUM PHOSPHATE, MONOBASIC, MONOHYDRATE AND SODIUM PHOSPHATE, DIBASIC, ANHYDROUS 20 MMOL: 142; 276 INJECTION, SOLUTION INTRAVENOUS at 14:56

## 2024-01-30 RX ADMIN — FUROSEMIDE 40 MG: 10 INJECTION, SOLUTION INTRAMUSCULAR; INTRAVENOUS at 17:08

## 2024-01-30 RX ADMIN — GABAPENTIN 300 MG: 300 CAPSULE ORAL at 15:00

## 2024-01-30 RX ADMIN — SODIUM CHLORIDE 3000 MG: 900 INJECTION INTRAVENOUS at 21:26

## 2024-01-30 RX ADMIN — CALCIUM CHLORIDE 1000 MG: 100 INJECTION, SOLUTION INTRAVENOUS at 01:27

## 2024-01-30 RX ADMIN — Medication 1 TABLET: at 09:54

## 2024-01-30 RX ADMIN — DEXMEDETOMIDINE HYDROCHLORIDE 0.2 MCG/KG/HR: 4 INJECTION, SOLUTION INTRAVENOUS at 00:11

## 2024-01-30 RX ADMIN — FENTANYL CITRATE 25 MCG: 50 INJECTION, SOLUTION INTRAMUSCULAR; INTRAVENOUS at 06:00

## 2024-01-30 RX ADMIN — HYDROMORPHONE HYDROCHLORIDE 0.5 MG: 1 INJECTION, SOLUTION INTRAMUSCULAR; INTRAVENOUS; SUBCUTANEOUS at 23:00

## 2024-01-30 RX ADMIN — FENTANYL CITRATE 25 MCG: 50 INJECTION, SOLUTION INTRAMUSCULAR; INTRAVENOUS at 09:50

## 2024-01-30 RX ADMIN — MORPHINE SULFATE 2 MG: 2 INJECTION, SOLUTION INTRAMUSCULAR; INTRAVENOUS at 20:26

## 2024-01-30 RX ADMIN — MUPIROCIN: 20 OINTMENT TOPICAL at 20:27

## 2024-01-30 RX ADMIN — CHLORHEXIDINE GLUCONATE 0.12% ORAL RINSE 15 ML: 1.2 LIQUID ORAL at 20:27

## 2024-01-30 RX ADMIN — IPRATROPIUM BROMIDE AND ALBUTEROL SULFATE 1 DOSE: 2.5; .5 SOLUTION RESPIRATORY (INHALATION) at 11:32

## 2024-01-30 RX ADMIN — HYDROMORPHONE HYDROCHLORIDE 0.5 MG: 1 INJECTION, SOLUTION INTRAMUSCULAR; INTRAVENOUS; SUBCUTANEOUS at 15:34

## 2024-01-30 RX ADMIN — SODIUM CHLORIDE 3000 MG: 900 INJECTION INTRAVENOUS at 05:52

## 2024-01-30 RX ADMIN — ATORVASTATIN CALCIUM 40 MG: 40 TABLET, FILM COATED ORAL at 20:27

## 2024-01-30 RX ADMIN — CHLORHEXIDINE GLUCONATE 0.12% ORAL RINSE 15 ML: 1.2 LIQUID ORAL at 09:56

## 2024-01-30 RX ADMIN — POTASSIUM CHLORIDE 20 MEQ: 29.8 INJECTION, SOLUTION INTRAVENOUS at 21:22

## 2024-01-30 RX ADMIN — SODIUM BICARBONATE 50 MEQ: 84 INJECTION, SOLUTION INTRAVENOUS at 02:00

## 2024-01-30 RX ADMIN — POTASSIUM CHLORIDE 20 MEQ: 29.8 INJECTION, SOLUTION INTRAVENOUS at 02:02

## 2024-01-30 RX ADMIN — POLYETHYLENE GLYCOL (3350) 17 G: 17 POWDER, FOR SOLUTION ORAL at 09:57

## 2024-01-30 RX ADMIN — POTASSIUM CHLORIDE 20 MEQ: 29.8 INJECTION, SOLUTION INTRAVENOUS at 03:58

## 2024-01-30 RX ADMIN — MUPIROCIN: 20 OINTMENT TOPICAL at 09:56

## 2024-01-30 RX ADMIN — SENNOSIDES, DOCUSATE SODIUM 1 TABLET: 8.6; 5 TABLET ORAL at 20:27

## 2024-01-30 RX ADMIN — GABAPENTIN 300 MG: 300 CAPSULE ORAL at 20:27

## 2024-01-30 RX ADMIN — ACETAMINOPHEN 1000 MG: 500 TABLET ORAL at 17:07

## 2024-01-30 RX ADMIN — ENOXAPARIN SODIUM 30 MG: 100 INJECTION SUBCUTANEOUS at 20:26

## 2024-01-30 RX ADMIN — IPRATROPIUM BROMIDE AND ALBUTEROL SULFATE 1 DOSE: 2.5; .5 SOLUTION RESPIRATORY (INHALATION) at 16:54

## 2024-01-30 RX ADMIN — ONDANSETRON 4 MG: 2 INJECTION INTRAMUSCULAR; INTRAVENOUS at 06:34

## 2024-01-30 RX ADMIN — ENOXAPARIN SODIUM 30 MG: 100 INJECTION SUBCUTANEOUS at 09:55

## 2024-01-30 ASSESSMENT — PAIN SCALES - GENERAL
PAINLEVEL_OUTOF10: 8
PAINLEVEL_OUTOF10: 0
PAINLEVEL_OUTOF10: 10
PAINLEVEL_OUTOF10: 0
PAINLEVEL_OUTOF10: 10
PAINLEVEL_OUTOF10: 10
PAINLEVEL_OUTOF10: 7
PAINLEVEL_OUTOF10: 0
PAINLEVEL_OUTOF10: 8
PAINLEVEL_OUTOF10: 8
PAINLEVEL_OUTOF10: 3
PAINLEVEL_OUTOF10: 10

## 2024-01-30 ASSESSMENT — PULMONARY FUNCTION TESTS
PIF_VALUE: 19
PIF_VALUE: 20
PIF_VALUE: 16
PIF_VALUE: 16
PIF_VALUE: 21
PIF_VALUE: 0
PIF_VALUE: 19
PIF_VALUE: 16
PIF_VALUE: 20
PIF_VALUE: 15
PIF_VALUE: 16
PIF_VALUE: 20
PIF_VALUE: 16
PIF_VALUE: 21

## 2024-01-30 ASSESSMENT — PAIN DESCRIPTION - ONSET
ONSET: GRADUAL
ONSET: GRADUAL
ONSET: ON-GOING
ONSET: GRADUAL
ONSET: GRADUAL

## 2024-01-30 ASSESSMENT — PAIN DESCRIPTION - DESCRIPTORS
DESCRIPTORS: ACHING
DESCRIPTORS: ACHING;SHARP
DESCRIPTORS: ACHING
DESCRIPTORS: ACHING
DESCRIPTORS: ACHING;SHARP
DESCRIPTORS: ACHING;SHARP
DESCRIPTORS: ACHING
DESCRIPTORS: ACHING;SHARP
DESCRIPTORS: SHARP

## 2024-01-30 ASSESSMENT — PAIN - FUNCTIONAL ASSESSMENT
PAIN_FUNCTIONAL_ASSESSMENT: PREVENTS OR INTERFERES SOME ACTIVE ACTIVITIES AND ADLS
PAIN_FUNCTIONAL_ASSESSMENT: ACTIVITIES ARE NOT PREVENTED
PAIN_FUNCTIONAL_ASSESSMENT: PREVENTS OR INTERFERES SOME ACTIVE ACTIVITIES AND ADLS

## 2024-01-30 ASSESSMENT — PAIN DESCRIPTION - LOCATION
LOCATION: CHEST
LOCATION: CHEST;INCISION
LOCATION: CHEST
LOCATION: CHEST;STERNUM
LOCATION: CHEST;INCISION
LOCATION: STERNUM
LOCATION: CHEST;INCISION

## 2024-01-30 ASSESSMENT — PAIN DESCRIPTION - PAIN TYPE
TYPE: ACUTE PAIN;SURGICAL PAIN
TYPE: SURGICAL PAIN
TYPE: ACUTE PAIN;SURGICAL PAIN
TYPE: SURGICAL PAIN
TYPE: SURGICAL PAIN;ACUTE PAIN

## 2024-01-30 ASSESSMENT — PAIN DESCRIPTION - FREQUENCY
FREQUENCY: INTERMITTENT
FREQUENCY: CONTINUOUS
FREQUENCY: CONTINUOUS
FREQUENCY: INTERMITTENT
FREQUENCY: CONTINUOUS

## 2024-01-30 ASSESSMENT — PAIN DESCRIPTION - ORIENTATION
ORIENTATION: MID
ORIENTATION: MID;UPPER
ORIENTATION: MID

## 2024-01-30 NOTE — PROGRESS NOTES
GLENN Hu PA-C at bedside talking with Dr Trejo via telephone.  Updates give re ABP, ABG results, and UOP for last hour.  Fio2 decreased to 70%.  Continue with current and notify Dr Trejo if low UOP or any needs.

## 2024-01-30 NOTE — PROGRESS NOTES
Called and notified Dr Trejo of 's/50's, CVP 10-11.  UOP 385ml and 200ml for last 2 hours.  Ordered do not start Chuy drip at this time.  OK to start Chuy for low UOP and/or low BP.

## 2024-01-30 NOTE — PROGRESS NOTES
GLENN Hu PA-C talking with Dr Trejo via phone.  Ordered stop cardene drip, Start Epi drip at 1 mcg/min and may increase to 2 mcg/min to keep -140's.  May start Chuy drip if Epi at 2 mcg/min and ABP <130.  Start Primacor at 0.25 mcg/kg/min.  Turn pacemaker off.  Tlm showing NSR with HR 70's.

## 2024-01-30 NOTE — PROGRESS NOTES
V2.0  [facility] Progress Note      Name:  Jose Figueroa /Age/Sex: 1965  (58 y.o. male)   MRN & CSN:  [id number] & [id number] Encounter Date/Time: 2024 8:40 AM EST    Location:  -A PCP: Arabella Boles MD       Hospital Day: 2    Assessment and Plan:   Jose Figueroa is a 58 y.o. male with pmh of  Hypertension, hyperlipidemia, diabetes mellitus and previous stroke  now s/p CABG x 2     HTN  CAD  S/p CABG  DM on metformin    Plan:  - extubated per protocol.   - complaints of pain. On tramadol/gabapentin and tylenol. Recommend dilaudid x1  - on epi and dheeraj gtt  - on milrinone per CTS  - chest tube management per CTS  - fevers post op - on ancef    Hx of DM  - on metformin at home. A1C 6.6.   - still with high requirements on insulin gtt. Will continue till AM and then transition to [facility]    I have performed 50 minutes of critical care time, excluding and separately billable procedures      Diet ADULT DIET; Clear Liquid; 4 carb choices (60 gm/meal); Low Fat/Low Chol/High Fiber/2 gm Na   DVT Prophylaxis [] Lovenox, []  Heparin, [x] SCDs, [] Ambulation,  [] Eliquis, [] Xarelto  [] Coumadin   GI Prophylaxis [x] Yes          [] No   Code Status Full Code    Disposition Patient requires continued ICU care due to CABG     Subjective:     Extubated per protocol.   On pressors.   Complains of severe incisional pain and difficulty with deep breaths         Review of Systems:    Review of Systems   Constitutional: Negative.    HENT: Negative.     Respiratory:  Positive for chest tightness.    Cardiovascular:  Positive for chest pain.   Gastrointestinal: Negative.    Genitourinary: Negative.    Musculoskeletal: Negative.            Objective:     Intake/Output Summary (Last 24 hours) at 2024 0840  Last data filed at 2024 0823  Gross per 24 hour   Intake 4597.61 ml   Output 3152 ml   Net 1445.61 ml        Vitals:   Vitals:    24 0827   BP:    Pulse: 93   Resp: 22  tube.  Similar position of devices.  Similar pulmonary vascular congestion and pleural effusions.  No pneumothorax.  No gross bony abnormality.     Interval removal of endotracheal tube.  Otherwise stable chest.     XR ABDOMEN (KUB) (SINGLE AP VIEW)    Result Date: 1/29/2024  EXAMINATION: ONE SUPINE XRAY VIEW(S) OF THE ABDOMEN 1/29/2024 8:44 pm COMPARISON: None. HISTORY: ORDERING SYSTEM PROVIDED HISTORY: verify OGT placement TECHNOLOGIST PROVIDED HISTORY: Reason for exam:->verify OGT placement Reason for Exam: verify OGT placement FINDINGS: Enteric tube tip and side holes are confirmed in the lumen of the stomach. Nonspecific bowel gas pattern in the upper abdomen.  Left-sided chest tube appears in place with a possible right chest tube as well.  Asymmetric ground-glass attenuation at the left base.  No skeletal finding.     Appropriate positioning of enteric tube.  Asymmetric airspace changes partially observed in the lower left chest.     XR CHEST PORTABLE    Result Date: 1/29/2024  EXAMINATION: ONE XRAY VIEW OF THE CHEST 1/29/2024 5:48 pm COMPARISON: 01/22/2024 HISTORY: ORDERING SYSTEM PROVIDED HISTORY: Post op open heart surgery TECHNOLOGIST PROVIDED HISTORY: Reason for exam:->Post op open heart surgery Reason for Exam: Post op open heart surgery Additional signs and symptoms: na Relevant Medical/Surgical History: diabetes, hypertension FINDINGS: Post sternotomy changes are seen.  Heart size is enlarged.  Right IJ catheter tip projects in region of SVC. Tip of ET tube projects in the region of the lower thoracic trachea. Right basilar opacity is seen Heterogeneous opacity is seen throughout the left lung, likely a combination of left-sided pleural effusion left lung consolidation. Left-sided chest tube is seen     Postop lines and tubes as above.  There is bilateral pleuroparenchymal disease, left greater than right       Electronically signed by Neyda Zambrano MD on 1/30/2024 at 8:40 AM

## 2024-01-30 NOTE — PROGRESS NOTES
Patient following commands.  KAUR.  Calm, cooperative.  Able to raise and hold head off pillow for >10 seconds.  NIF -23.  RSBI 42.  Dr Heck called and notified at 0308 and ordered ok to extubate to N/C.  Patient extubated at 0315 to 6 l/min N/C.  Tolerated well.

## 2024-01-30 NOTE — PLAN OF CARE
Problem: Chronic Conditions and Co-morbidities  Goal: Patient's chronic conditions and co-morbidity symptoms are monitored and maintained or improved  1/30/2024 0945 by Linh Lazo RN  Outcome: Progressing  1/29/2024 2319 by Volodymyr Wheeler RN  Outcome: Progressing     Problem: Discharge Planning  Goal: Discharge to home or other facility with appropriate resources  1/30/2024 0945 by Linh Lazo RN  Outcome: Progressing  1/29/2024 2319 by Volodymyr Wheeler RN  Outcome: Progressing     Problem: Pain  Goal: Verbalizes/displays adequate comfort level or baseline comfort level  1/30/2024 0945 by Linh Lazo RN  Outcome: Progressing  1/29/2024 2319 by Volodymyr Wheeler RN  Outcome: Progressing     Problem: Skin/Tissue Integrity  Goal: Absence of new skin breakdown  Description: 1.  Monitor for areas of redness and/or skin breakdown  2.  Assess vascular access sites hourly  3.  Every 4-6 hours minimum:  Change oxygen saturation probe site  4.  Every 4-6 hours:  If on nasal continuous positive airway pressure, respiratory therapy assess nares and determine need for appliance change or resting period.  1/30/2024 0945 by Linh Lazo RN  Outcome: Progressing  1/29/2024 2319 by Volodymyr Wheeler RN  Outcome: Progressing     Problem: Safety - Adult  Goal: Free from fall injury  1/30/2024 0945 by Linh Lazo RN  Outcome: Progressing  1/29/2024 2319 by Volodymyr Wheeler RN  Outcome: Progressing     Problem: Neurosensory - Adult  Goal: Achieves stable or improved neurological status  1/30/2024 0945 by Linh Lazo RN  Outcome: Progressing  1/29/2024 2319 by Volodymyr Wheeler RN  Outcome: Progressing  Goal: Achieves maximal functionality and self care  1/30/2024 0945 by Linh Lazo RN  Outcome: Progressing  1/29/2024 2319 by Volodymyr Wheeler RN  Outcome: Progressing     Problem: Respiratory - Adult  Goal: Achieves optimal ventilation and oxygenation  1/30/2024 0945 by Linh Lazo RN  Outcome:  Progressing  1/29/2024 2319 by Volodymyr Wheeler RN  Outcome: Progressing     Problem: Cardiovascular - Adult  Goal: Maintains optimal cardiac output and hemodynamic stability  1/30/2024 0945 by Linh Lazo RN  Outcome: Progressing  1/29/2024 2319 by Volodymyr Wheeler RN  Outcome: Progressing  Goal: Absence of cardiac dysrhythmias or at baseline  1/30/2024 0945 by Linh Lazo RN  Outcome: Progressing  1/29/2024 2319 by Volodymyr Wheeler RN  Outcome: Progressing     Problem: Skin/Tissue Integrity - Adult  Goal: Skin integrity remains intact  1/30/2024 0945 by Linh Lazo RN  Outcome: Progressing  Flowsheets (Taken 1/30/2024 0934)  Skin Integrity Remains Intact:   Monitor for areas of redness and/or skin breakdown   Assess vascular access sites hourly  1/29/2024 2319 by Volodymyr Wheeler RN  Outcome: Progressing  Goal: Incisions, wounds, or drain sites healing without S/S of infection  1/30/2024 0945 by Linh Lazo RN  Outcome: Progressing  1/29/2024 2319 by Volodymyr Wheeler RN  Outcome: Progressing  Goal: Oral mucous membranes remain intact  1/30/2024 0945 by Linh Lazo RN  Outcome: Progressing  1/29/2024 2319 by Volodymyr Wheeler RN  Outcome: Progressing     Problem: Musculoskeletal - Adult  Goal: Return mobility to safest level of function  1/30/2024 0945 by Linh Lazo RN  Outcome: Progressing  1/29/2024 2319 by Volodymyr Wheeler RN  Outcome: Progressing  Goal: Maintain proper alignment of affected body part  1/30/2024 0945 by Linh Lazo RN  Outcome: Progressing  1/29/2024 2319 by Volodymyr Wheeler RN  Outcome: Progressing  Goal: Return ADL status to a safe level of function  1/30/2024 0945 by Linh Lazo RN  Outcome: Progressing  1/29/2024 2319 by Volodymyr Wheeler RN  Outcome: Progressing     Problem: Gastrointestinal - Adult  Goal: Minimal or absence of nausea and vomiting  1/30/2024 0945 by Linh Lazo RN  Outcome: Progressing  1/29/2024 2319 by Volodymyr Wheeler RN  Outcome:

## 2024-01-30 NOTE — CONSULTS
[facility] ACUTE CARE PHYSICAL THERAPY EVALUATION  Jose Figueroa, 1965, 2004/2004-A, 1/30/2024    History  [place]:  There were no encounter diagnoses.  Patient  has a past medical history of CVA (cerebral vascular accident) (HCC), Diabetes mellitus (HCC), Hyperlipidemia, and Hypertension.  Patient  has a past surgical history that includes brain surgery; Cardiac procedure (N/A, 1/9/2024); and invasive vascular (N/A, 1/9/2024).    Discharge Recommendation: ARU    Subjective:    Patient states:  \"I can't do it\"      Pain:  4/10 at rest, 9/10 w mobility, at surgery site, pt vocal about pain levels throughout the session, pt educated on deep breathing for pain management throughout    Communication with other providers:  Handoff to RN, OT    Restrictions: Fall precautions, Sternal precautions, 2 chest tube, aiken catheter, O2 6L, [place] kristian    Home Setup/Prior level of function   Social/Functional History  Lives With: Son  Type of Home: Trailer  Home Layout: One level  Home Access: Level entry, Stairs to enter with rails  Entrance Stairs - Number of Steps: 6  Bathroom Shower/Tub: Tub/Shower unit  Bathroom Toilet: Standard  Home Equipment: Cane  Has the patient had two or more falls in the past year or any fall with injury in the past year?: No  ADL Assistance: Independent  Homemaking Assistance: Independent  Homemaking Responsibilities: Yes  Ambulation Assistance: Independent  Transfer Assistance: Independent  Active : Yes     The above information was pulled from previous evaluation and verified with the patient    Examination of body systems (includes body structures/functions, activity/participation limitations):  Observation:  Pt in chair upon arrival and agreeable to therapy  Vision: Glasses  Hearing:  WFL  Cardiopulmonary:  O2 6L, telemetry, vitals stable throughout  Cognition: AOx4, see OT/SLP note for further evaluation.    Musculoskeletal  ROM R/L:  WFL.    Strength R/L:  4/5, mild in

## 2024-01-30 NOTE — PROGRESS NOTES
01/30/24 0315   Patient Observation   Pulse 95   Respirations 15   SpO2 97 %   Vent Information   Vent Mode CPAP/PS   Ventilator Settings   Pressure Support (cm H2O) 10 cm H2O   Vent Patient Data (Readings)   Backup Apnea On   Backup Rate 16 Breaths Per Minute   Backup Vt 500   Vent Alarm Settings   Low Minute Volume (lpm) 2.5 L/min   High Minute Volume (lpm) 20 L/min   Low Exhaled Vt (ml) 250 mL   High Exhaled Vt (ml) 1000 mL   RR High (bpm) 40 br/min   Apnea (secs) 20 secs   Additional Respiratoray Assessments   Humidification Source HME   Ambu Bag With Mask At Bedside Yes   Airway Clearance   Suction ET Tube   Suction Device Inline suction catheter   Sputum Method Obtained Endotracheal   Sputum Amount Small   Sputum Color/Odor White   Sputum Consistency Thick   Ventilator Associated Pneumonia Bundle   Elevation of Head of Bed to 30-45 Degrees  Yes   Weaning Parameters   Spontaneous Breathing Trial Complete Yes   Respiratory Rate Observed 22   Ve 474   VT 10.4   RSBI 45   NIF 23   Sarmiento Agitation Sedation Scale (RASS) 0     Pt extubated and placed on nasal cannula @6lpm with O2 Sat 96%. HR90.,Resp 21  Will continue to monitor Pt closely.

## 2024-01-30 NOTE — CONSULTS
Nutrition Education    Educated on Heart Healthy Nutrition Therapy  Learners: Patient  Method: Handout  Response: Needs Reinforcement; pt POD1, in pain at visit, not appropriate for ed discussion at this time; handouts provided for review. Will follow up during stay for ongoing diet education needs  Contact name and number provided.    Caitlyn Flores RD  Contact Number: [phone]

## 2024-01-30 NOTE — PROGRESS NOTES
[facility] Cardiothoracic Surgery  Daily Progress Note    Surgeon:  Dr. Kaden Trejo       Subjective:  Mr. Figueroa is a 58 y.o. year-old male status post CORONARY ARTERY BYPASS GRAFT X2, LIM[place] - LAD, SVG - PDA; LEFT ENDOSCOPIC GREATER SAPHENOUS VEIN HARVEST; INTRAOPERATIVE TRANSESOPHAGEAL ECHOCARDIOGRAM  on 1/29/24.      Patient was seen and examined at bedside this morning without any complaints.      Vital Signs: BP (!) 141/76   Pulse 95   Temp (!) 100.8 °F (38.2 °C) (Core)   Resp 27   Ht 1.753 m (5' 9\")   Wt 124.6 kg (274 lb 9.6 oz)   SpO2 93%   BMI 40.55 kg/m²  O2 Flow Rate (L/min): 6 L/min   Admit Weight: Weight - Scale: 120.3 kg (265 lb 2 oz)   WEIGHTWeight - Scale: 124.6 kg (274 lb 9.6 oz)     I/O's:  I/O last 3 completed shifts:  In: 4581.5 [P.O.:340; I.V.:2684.8; Blood:200; NG/GT:120; IV Piggyback:1236.8]  Out: 3152 [Urine:2001; Emesis/NG output:500; Blood:300; Chest Tube:351]    Data:    CBC:   Recent Labs     01/29/24 1810 01/29/24 1852 01/30/24  0000 01/30/24  0153 01/30/24  0556 01/30/24  0600 01/30/24  0817   WBC 16.2*  --  21.7*  --   --  21.1*  --    HGB 10.4*   < > 10.5*   < > 11.1* 10.7* 11.5*   HCT 31.8*   < > 32.0*   < > 33.0* 32.5* 34.0*   MCV 87.4  --  87.0  --   --  86.2  --      --  214  --   --  284  --     < > = values in this interval not displayed.     BMP:   Recent Labs     01/29/24 1810 01/29/24  1852 01/30/24  0000 01/30/24  0052 01/30/24  0556 01/30/24  0600 01/30/24  0651 01/30/24  0817      < > 141   < > 145 143  --  145   K 4.0   < > 4.3   < > 4.2 4.3  --  4.3     --  104  --   --  107  --   --    CO2 23   < > 20*   < > 23 20* 23 24   PHOS 2.6  --  2.4*  --   --  1.6*  --   --    BUN 13  --  15  --   --  16  --   --    CREATININE 1.0   < > 1.4*   < > 1.4* 1.6*  --  1.3*    < > = values in this interval not displayed.     PT/INR:   Recent Labs     01/29/24  1810 01/30/24  0600   PROTIME 15.7* 14.2   INR 1.2 1.1  TRANSESOPHAGEAL ECHOCARDIOGRAM  on 1/29/24.   1/29/24:      Plan:  Tramadol PRN  Wean dheeraj then epi for goal MAP>65  Hourly UOP  Give 500cc albumin 5% now  Keep chest tubes and central line  PT/OT evaluation  Sodium phos 20mmol x1 dose    Level of Care:  ICU     The above recommendations including medications and orders were discussed and agreed upon with Dr. Neil Andrade PA-C 01/30/24 12:57 PM        New Consults 8:00AM-4:30PM: Call Office, 4:30PM to 8:00AM Surgeon on-call    CVICU or other units patient follow up: Rashad author of this note 8:00AM-4:30PM    CVICU patient or urgent follow up: 4:30PM to 8:00AM Call or Page Surgeon on-call     Step-down patient follow up: 4:30PM to 8:00AM Page or secure chat PA on-call

## 2024-01-30 NOTE — PROGRESS NOTES
Occupational Therapy  [facility] ACUTE CARE OCCUPATIONAL THERAPY EVALUATION  Jose Figueroa, 1965, 2004/2004-A, 1/30/2024    Discharge Recommendation:  Inpatient Rehabilitation    History  [place]:  There were no encounter diagnoses.  Patient  has a past medical history of CVA (cerebral vascular accident) (HCC), Diabetes mellitus (HCC), Hyperlipidemia, and Hypertension.  Patient  has a past surgical history that includes brain surgery; Cardiac procedure (N/A, 1/9/2024); and invasive vascular (N/A, 1/9/2024).    Subjective:  Patient states:  \"I can't do this\".    Pain:  4-5/10 at rest, 8-9/10 w/ mobility, RN in room at end of session w/ pain meds    Communication with other providers: RN, co-eval with PT Desi and SPT Sanna.  Restrictions: General Precautions, Fall Risk, Sternal precautions , 2 chest tubes, [place] line, aiken, hx of CVA w/ R sided deficits     Home Setup/Prior level of function   Social/Functional History  Lives With: Son  Type of Home: Trailer  Home Layout: One level  Home Access: Level entry, Stairs to enter with rails  Entrance Stairs - Number of Steps: 6  Bathroom Shower/Tub: Tub/Shower unit  Bathroom Toilet: Standard  Home Equipment: Cane (does not use at baseline)  Has the patient had two or more falls in the past year or any fall with injury in the past year?: No  ADL Assistance: Independent  Homemaking Assistance: Independent  Homemaking Responsibilities: Yes  Ambulation Assistance: Independent  Transfer Assistance: Independent  Active : Yes    The above information was pulled from a previous encounter and verified with the patient. Pt reports 2 falls since last admission     Examination of body systems (includes body structures/functions, activity/participation limitations):  Observation:  Seated in chair upon arrival, agreeable to therapy   Vision:  Glasses prn  Hearing: WFL  Cardiopulmonary:  On 6L O2, tele, vitals remained stable throughout session       Body Systems  scooting, sternal precautions)   Stand to sit: Juan (from cardiac walker to EOB for eccentric control, verbal cues for adherence to sternal precautions)   Sit to supine: maxA x2 (to advance BLE into bed and for safe eccentric torso control, verbal cues for adherence to precautions)   Functional Mobility: modA progressing to CGA (w/ cardiac walker, approx 50ft in vivar, w/ increased time, very close chair follow, see PT note for more details on gait)      Balance:   Sitting balance: Fair+  Standing balance: Fair-    Pt educated on role of therapy, POC, safety awareness, importance of OOB activity, sternal precautions     Treatment:  Therapeutic Activity Training:   Therapeutic activity training was instructed today.  Cues were given for safety, sequence, UE/LE placement, awareness, and balance.    Activities performed today included sit-stand, functional mobility, stand-sit, sit-sup.    Safety: Patient left in bed w/ RN present, call light within reach, RN notified, gait belt used.    Assessment:  Pt is a 59 y/o male admitted from home for s/p CABG. Pt at baseline is Ira for ADLs and ind for functional transfers/mobility. Pt currently presents w/ deficits relating to ADLs, IADLs, UE strength/ROM, functional activity tolerance, safety awareness, and functional mobility. Pt would benefit from continued acute care OT services w/ discharge to ARU    Complexity: Moderate   Prognosis: Good, no significant barriers to participation at this time.   Occupational Therapy Plan  Times Per Week: 3x+  Times Per Day: Once a day  Current Treatment Recommendations: Strengthening, Safety education & training, ROM, Balance training, Home management training, Patient/Caregiver education & training, Functional mobility training, Endurance training, Equipment evaluation, education, & procurement, Neuromuscular re-education, Pain management, Self-Care / ADL, Positioning, Cognitive/Perceptual training, Coordination training, Co-Treatment

## 2024-01-30 NOTE — PLAN OF CARE
Problem: Chronic Conditions and Co-morbidities  Goal: Patient's chronic conditions and co-morbidity symptoms are monitored and maintained or improved  Outcome: Progressing     Problem: Discharge Planning  Goal: Discharge to home or other facility with appropriate resources  Outcome: Progressing     Problem: Pain  Goal: Verbalizes/displays adequate comfort level or baseline comfort level  Outcome: Progressing     Problem: Skin/Tissue Integrity  Goal: Absence of new skin breakdown  Description: 1.  Monitor for areas of redness and/or skin breakdown  2.  Assess vascular access sites hourly  3.  Every 4-6 hours minimum:  Change oxygen saturation probe site  4.  Every 4-6 hours:  If on nasal continuous positive airway pressure, respiratory therapy assess nares and determine need for appliance change or resting period.  Outcome: Progressing     Problem: Safety - Adult  Goal: Free from fall injury  Outcome: Progressing     Problem: Neurosensory - Adult  Goal: Achieves stable or improved neurological status  Outcome: Progressing  Goal: Achieves maximal functionality and self care  Outcome: Progressing     Problem: Respiratory - Adult  Goal: Achieves optimal ventilation and oxygenation  Outcome: Progressing     Problem: Cardiovascular - Adult  Goal: Maintains optimal cardiac output and hemodynamic stability  Outcome: Progressing  Goal: Absence of cardiac dysrhythmias or at baseline  Outcome: Progressing     Problem: Skin/Tissue Integrity - Adult  Goal: Skin integrity remains intact  Outcome: Progressing  Goal: Incisions, wounds, or drain sites healing without S/S of infection  Outcome: Progressing  Goal: Oral mucous membranes remain intact  Outcome: Progressing     Problem: Musculoskeletal - Adult  Goal: Return mobility to safest level of function  Outcome: Progressing  Goal: Maintain proper alignment of affected body part  Outcome: Progressing  Goal: Return ADL status to a safe level of function  Outcome: Progressing

## 2024-01-30 NOTE — CONSULTS
--  161  --      BMP:    Recent Labs     01/29/24  1753 01/29/24  1810 01/29/24  1852    138 142   K 4.0 4.0 3.9   CL  --  105  --    CO2 25 23 26   BUN  --  13  --    CREATININE 1.0 1.0 1.1   GLUCOSE  --  123*  --      Hepatic: No results for input(s): \"AST\", \"ALT\", \"ALB\", \"BILITOT\", \"ALKPHOS\" in the last 72 hours.  Lipids:   Lab Results   Component Value Date/Time    CHOL 278 07/27/2023 05:15 AM    HDL 36 07/27/2023 05:15 AM    TRIG 261 07/27/2023 05:15 AM     Hemoglobin A1C:   Lab Results   Component Value Date/Time    LABA1C 6.6 01/22/2024 09:25 AM     TSH: No results found for: \"TSH\"  Troponin: No results found for: \"TROPONINT\"  Lactic Acid: No results for input(s): \"LACTA\" in the last 72 hours.  BNP: No results for input(s): \"PROBNP\" in the last 72 hours.  UA:  Lab Results   Component Value Date/Time    NITRU NEGATIVE 01/22/2024 09:25 AM    COLORU YELLOW 01/22/2024 09:25 AM    CLARITYU CLEAR 01/22/2024 09:25 AM    SPECGRAV 1.020 01/22/2024 09:25 AM    LEUKOCYTESUR NEGATIVE 01/22/2024 09:25 AM    UROBILINOGEN 0.2 01/22/2024 09:25 AM    BILIRUBINUR NEGATIVE 01/22/2024 09:25 AM    BLOODU NEGATIVE 01/22/2024 09:25 AM    KETUA NEGATIVE 01/22/2024 09:25 AM     Urine Cultures: No results found for: \"LABURIN\"  Blood Cultures: No results found for: \"BC\"  No results found for: \"BLOODCULT2\"  Organism: No results found for: \"ORG\"      Assessment & Plan:  Jose Figueroa is a 58 y.o. male admitted for Triple vessel coronary artery disease     Problem list  Principal Problem:    Triple vessel coronary artery disease  Active Problems:    S/P 2-vessel coronary artery bypass  Resolved Problems:    * No resolved hospital problems. *     Plan:    -CNS:   Neurochecks per protocol, delirium precautions.    -Resp:   Acute hypoxic respiratory failure, intubated for the procedure  wean off the vent, plan to extubate within 6 hours if tolerating PS   Target SpO2 92 to 94%  Bronchodilators PRN      -CVS:   Telemetry and close  hemodynamic monitoring per ICU protocol.  MAP goal >65 using vasopressors  monitor volume status, diurese as needed   monitor drain and chest tube output  further management per cardiothoracic surgery    -GI/:   start enteral/tube feeding as soon as possible after extubation, bowel regimen as needed    -ID:   Maintain euthermia, monitor fever curve.   Follow-up cultures and sensitivities.    -Renal:   Monitor urine output, avoid nephrotoxic agents  Monitor BMPs and replace electrolytes as needed     -Endocrine:   Target glucose is 140-180 using insulin drip    -Hematology: Hgb Target >7, transfuse as needed  monitor CBCs and coags    -DVT PPX: SCDs,  anticoagulation per CT surgery    -Disposition: ICU    Extended Emergency Contact Information  Primary Emergency Contact: Sharon Mascorro  Home Phone: [phone]  Mobile Phone: [phone]  Relation: Brother/Sister  Secondary Emergency Contact: Wendy Arauz, [facility]  Home Phone: [phone]  Relation: Friend       I have personally reviewed the patient's history and interval events in the EMR, along with vitals, labs and radiology imaging. Critical care time was spent personally providing care for this patient, excluding billable procedures, and no overlapping with any other provider. The patient has high probability of deterioration required my full and direct attention, intervention, and personal management. Total nonprocedural critical care time spent 70 minutes.

## 2024-01-30 NOTE — CONSULTS
Name:  Jose Figueroa /Age/Sex: 1965  (58 y.o. male)   MRN & CSN:  [id number] & [id number] Admission Date/Time: 2024  9:54 AM   Location:  -A PCP: Arabella Boles MD       Hospital Day: 2          Referring physician:  Kaden Trejo MD         Reason for consultation: Post CABG        Thanks for referral.    Information source: Patient    CC; post CABG      HPI:   Thank you for involving me in taking  care of Jose Figueroa who  is a 58 y.o.year  Old male  Presents with history of CAD had a CABG performed yesterday here for post CABG follow-up         Procedure(s):  CORONARY ARTERY BYPASS GRAFT X2, LIMA - LAD, SVG - PDA; LEFT ENDOSCOPIC GREATER SAPHENOUS VEIN HARVEST; INTRAOPERATIVE TRANSESOPHAGEAL ECHOCARDIOGRAM              Past medical history:    has a past medical history of CVA (cerebral vascular accident) (HCC), Diabetes mellitus (HCC), Hyperlipidemia, and Hypertension.  Past surgical history:   has a past surgical history that includes brain surgery; Cardiac procedure (N/A, 2024); and invasive vascular (N/A, 2024).  Social History:   reports that he quit smoking about 21 years ago. His smoking use included cigarettes and cigars. He started smoking about 56 years ago. He has a 45.0 pack-year smoking history. He has never used smokeless tobacco. He reports that he does not currently use alcohol. He reports that he does not use drugs.  Family history:  family history includes Diabetes in his maternal grandmother; Hypertension in his maternal uncle; Stroke in his maternal grandfather.    No Known Allergies    [Held by provider] sertraline (ZOLOFT) tablet 100 mg, Daily  dexmedeTOMIDine (PRECEDEX) 400 mcg in sodium chloride 0.9 % 100 mL infusion, Continuous  [START ON 2024] bacitracin ointment, BID  gabapentin (NEURONTIN) capsule 300 mg, TID  potassium chloride (KLOR-CON M) extended release tablet 40 mEq, PRN   Or  potassium bicarb-citric acid (EFFER-K)  Ritika Andrade PA-C        enoxaparin Sodium (LOVENOX) injection 30 mg  30 mg SubCUTAneous BID Ritika Andrade PA-C        ondansetron (ZOFRAN-ODT) disintegrating tablet 4 mg  4 mg Oral Q8H PRN Ritika Andrade PA-C        Or    ondansetron (ZOFRAN) injection 4 mg  4 mg IntraVENous Q6H PRN Ritika Andrade PA-C   4 mg at 01/30/24 0634    aspirin tablet 325 mg  325 mg Oral Once Ritika Andrade PA-C        [START ON 1/31/2024] clopidogrel (PLAVIX) tablet 75 mg  75 mg Oral Daily Ritika Andrade PA-C        acetaminophen (TYLENOL) tablet 1,000 mg  1,000 mg Oral Q6H Ritika Andrade PA-C   1,000 mg at 01/30/24 0601    traMADol (ULTRAM) tablet 50 mg  50 mg Oral Q6H PRN Ritika Andrade PA-C        fentaNYL (SUBLIMAZE) injection 25 mcg  25 mcg IntraVENous Q1H PRN Ritika Andrade PA-C   25 mcg at 01/30/24 0600    chlorhexidine (PERIDEX) 0.12 % solution 15 mL  15 mL Mouth/Throat BID Ritika Andrade PA-C   15 mL at 01/29/24 2058    [START ON 2/1/2024] hydrALAZINE (APRESOLINE) injection 20 mg  20 mg IntraVENous Q6H PRN Ritika Andrade PA-C        mupirocin (BACTROBAN) 2 % ointment   Each Nostril BID Ritika Andrade PA-C   Given at 01/29/24 2058    propofol infusion  5-50 mcg/kg/min IntraVENous Continuous Ritika Andrade PA-C   Stopped at 01/30/24 0115    therapeutic multivitamin-minerals 1 tablet  1 tablet Oral Daily with breakfast Ritika Andrade PA-C        polyethylene glycol (GLYCOLAX) packet 17 g  17 g Oral Daily Ritika Andrade PA-C        sennosides-docusate sodium (SENOKOT-S) 8.6-50 MG tablet 1 tablet  1 tablet Oral BID Ritika Andrade PA-C   1 tablet at 01/29/24 2058    bisacodyl (DULCOLAX) suppository 10 mg  10 mg Rectal Daily PRN Ritika Andrade PA-C        atorvastatin (LIPITOR) tablet 40 mg  40 mg Oral Nightly Ritika Andrade PA-C        famotidine (PEPCID) tablet 20 mg  20 mg Oral BID Ritika Andrade PA-C        Or    famotidine (PEPCID) injection 20 mg  20 mg IntraVENous BID

## 2024-01-30 NOTE — PROGRESS NOTES
Patient following commands.  KAUR.  Able to raise and hold head off pillow for >10 seconds.  Placed on CPAP 10/5, fio2 40%.  Will remain at bedside and monitor.  Repeat ABG around 30-60 minutes.

## 2024-01-31 ENCOUNTER — APPOINTMENT (OUTPATIENT)
Dept: GENERAL RADIOLOGY | Age: 59
DRG: 166 | End: 2024-01-31
Attending: THORACIC SURGERY (CARDIOTHORACIC VASCULAR SURGERY)
Payer: COMMERCIAL

## 2024-01-31 LAB
ANION GAP SERPL CALCULATED.3IONS-SCNC: 8 MMOL/L (ref 7–16)
APTT: 32.6 SECONDS (ref 25.1–37.1)
BUN SERPL-MCNC: 12 MG/DL (ref 6–23)
CALCIUM IONIZED: 4.56 MG/DL (ref 4.48–5.28)
CALCIUM SERPL-MCNC: 7.7 MG/DL (ref 8.3–10.6)
CHLORIDE BLD-SCNC: 104 MMOL/L (ref 99–110)
CO2: 25 MMOL/L (ref 21–32)
CREAT SERPL-MCNC: 1.2 MG/DL (ref 0.9–1.3)
FIBRINOGEN LEVEL: 636 MG/DL (ref 170–540)
GFR SERPL CREATININE-BSD FRML MDRD: >60 ML/MIN/1.73M2
GLUCOSE BLD-MCNC: 107 MG/DL (ref 70–99)
GLUCOSE BLD-MCNC: 114 MG/DL (ref 70–99)
GLUCOSE BLD-MCNC: 123 MG/DL (ref 70–99)
GLUCOSE BLD-MCNC: 147 MG/DL (ref 70–99)
GLUCOSE BLD-MCNC: 155 MG/DL (ref 70–99)
GLUCOSE BLD-MCNC: 185 MG/DL (ref 70–99)
GLUCOSE BLD-MCNC: 208 MG/DL (ref 70–99)
GLUCOSE BLD-MCNC: 223 MG/DL (ref 70–99)
GLUCOSE BLD-MCNC: 89 MG/DL (ref 70–99)
GLUCOSE BLD-MCNC: 93 MG/DL (ref 70–99)
GLUCOSE BLD-MCNC: 93 MG/DL (ref 70–99)
GLUCOSE BLD-MCNC: 98 MG/DL (ref 70–99)
GLUCOSE SERPL-MCNC: 89 MG/DL (ref 70–99)
HCT VFR BLD CALC: 29.7 % (ref 42–52)
HEMOGLOBIN: 9.5 GM/DL (ref 13.5–18)
INR BLD: 1.2 INDEX
IONIZED CA: 1.14 MMOL/L (ref 1.12–1.32)
MAGNESIUM: 2.2 MG/DL (ref 1.8–2.4)
MCH RBC QN AUTO: 28.2 PG (ref 27–31)
MCHC RBC AUTO-ENTMCNC: 32 % (ref 32–36)
MCV RBC AUTO: 88.1 FL (ref 78–100)
PDW BLD-RTO: 12.4 % (ref 11.7–14.9)
PHOSPHORUS: 3.1 MG/DL (ref 2.5–4.9)
PLATELET # BLD: 171 K/CU MM (ref 140–440)
PMV BLD AUTO: 9.6 FL (ref 7.5–11.1)
POTASSIUM SERPL-SCNC: 3.9 MMOL/L (ref 3.5–5.1)
PROTHROMBIN TIME: 15.8 SECONDS (ref 11.7–14.5)
RBC # BLD: 3.37 M/CU MM (ref 4.6–6.2)
SODIUM BLD-SCNC: 137 MMOL/L (ref 135–145)
WBC # BLD: 12.2 K/CU MM (ref 4–10.5)

## 2024-01-31 PROCEDURE — 97116 GAIT TRAINING THERAPY: CPT

## 2024-01-31 PROCEDURE — 97530 THERAPEUTIC ACTIVITIES: CPT

## 2024-01-31 PROCEDURE — 97110 THERAPEUTIC EXERCISES: CPT

## 2024-01-31 PROCEDURE — 2700000000 HC OXYGEN THERAPY PER DAY

## 2024-01-31 PROCEDURE — 71045 X-RAY EXAM CHEST 1 VIEW: CPT

## 2024-01-31 PROCEDURE — 2100000000 HC CCU R&B

## 2024-01-31 PROCEDURE — 6360000002 HC RX W HCPCS: Performed by: PHYSICIAN ASSISTANT

## 2024-01-31 PROCEDURE — 84100 ASSAY OF PHOSPHORUS: CPT

## 2024-01-31 PROCEDURE — 6370000000 HC RX 637 (ALT 250 FOR IP): Performed by: STUDENT IN AN ORGANIZED HEALTH CARE EDUCATION/TRAINING PROGRAM

## 2024-01-31 PROCEDURE — 6370000000 HC RX 637 (ALT 250 FOR IP): Performed by: PHYSICIAN ASSISTANT

## 2024-01-31 PROCEDURE — 85730 THROMBOPLASTIN TIME PARTIAL: CPT

## 2024-01-31 PROCEDURE — 83735 ASSAY OF MAGNESIUM: CPT

## 2024-01-31 PROCEDURE — 2580000003 HC RX 258: Performed by: PHYSICIAN ASSISTANT

## 2024-01-31 PROCEDURE — 6370000000 HC RX 637 (ALT 250 FOR IP): Performed by: THORACIC SURGERY (CARDIOTHORACIC VASCULAR SURGERY)

## 2024-01-31 PROCEDURE — 85027 COMPLETE CBC AUTOMATED: CPT

## 2024-01-31 PROCEDURE — 99232 SBSQ HOSP IP/OBS MODERATE 35: CPT | Performed by: INTERNAL MEDICINE

## 2024-01-31 PROCEDURE — 80048 BASIC METABOLIC PNL TOTAL CA: CPT

## 2024-01-31 PROCEDURE — 94761 N-INVAS EAR/PLS OXIMETRY MLT: CPT

## 2024-01-31 PROCEDURE — 94640 AIRWAY INHALATION TREATMENT: CPT

## 2024-01-31 PROCEDURE — 82330 ASSAY OF CALCIUM: CPT

## 2024-01-31 PROCEDURE — 85384 FIBRINOGEN ACTIVITY: CPT

## 2024-01-31 PROCEDURE — 76937 US GUIDE VASCULAR ACCESS: CPT

## 2024-01-31 PROCEDURE — 85610 PROTHROMBIN TIME: CPT

## 2024-01-31 RX ORDER — INSULIN LISPRO 100 [IU]/ML
0-16 INJECTION, SOLUTION INTRAVENOUS; SUBCUTANEOUS EVERY 4 HOURS
Status: DISCONTINUED | OUTPATIENT
Start: 2024-01-31 | End: 2024-02-01

## 2024-01-31 RX ORDER — FUROSEMIDE 10 MG/ML
40 INJECTION INTRAMUSCULAR; INTRAVENOUS 2 TIMES DAILY
Status: DISCONTINUED | OUTPATIENT
Start: 2024-01-31 | End: 2024-02-01

## 2024-01-31 RX ORDER — POTASSIUM CHLORIDE 20 MEQ/1
20 TABLET, EXTENDED RELEASE ORAL 2 TIMES DAILY WITH MEALS
Status: DISCONTINUED | OUTPATIENT
Start: 2024-01-31 | End: 2024-02-01

## 2024-01-31 RX ORDER — ACETAMINOPHEN 500 MG
1000 TABLET ORAL EVERY 8 HOURS PRN
Status: DISCONTINUED | OUTPATIENT
Start: 2024-01-31 | End: 2024-01-31

## 2024-01-31 RX ORDER — FUROSEMIDE 10 MG/ML
40 INJECTION INTRAMUSCULAR; INTRAVENOUS ONCE
Status: COMPLETED | OUTPATIENT
Start: 2024-01-31 | End: 2024-01-31

## 2024-01-31 RX ORDER — INSULIN GLARGINE 100 [IU]/ML
10 INJECTION, SOLUTION SUBCUTANEOUS DAILY
Status: DISCONTINUED | OUTPATIENT
Start: 2024-01-31 | End: 2024-02-01

## 2024-01-31 RX ORDER — OXYCODONE HYDROCHLORIDE AND ACETAMINOPHEN 5; 325 MG/1; MG/1
1 TABLET ORAL ONCE
Status: DISCONTINUED | OUTPATIENT
Start: 2024-01-31 | End: 2024-01-31

## 2024-01-31 RX ORDER — ACETAMINOPHEN 500 MG
1000 TABLET ORAL EVERY 8 HOURS SCHEDULED
Status: DISCONTINUED | OUTPATIENT
Start: 2024-01-31 | End: 2024-02-06 | Stop reason: HOSPADM

## 2024-01-31 RX ADMIN — POLYETHYLENE GLYCOL (3350) 17 G: 17 POWDER, FOR SOLUTION ORAL at 08:37

## 2024-01-31 RX ADMIN — SODIUM CHLORIDE: 9 INJECTION, SOLUTION INTRAVENOUS at 01:45

## 2024-01-31 RX ADMIN — SODIUM CHLORIDE, PRESERVATIVE FREE 10 ML: 5 INJECTION INTRAVENOUS at 08:43

## 2024-01-31 RX ADMIN — IPRATROPIUM BROMIDE AND ALBUTEROL SULFATE 1 DOSE: 2.5; .5 SOLUTION RESPIRATORY (INHALATION) at 07:18

## 2024-01-31 RX ADMIN — INSULIN LISPRO 2 UNITS: 100 INJECTION, SOLUTION INTRAVENOUS; SUBCUTANEOUS at 20:15

## 2024-01-31 RX ADMIN — POTASSIUM CHLORIDE 20 MEQ: 29.8 INJECTION, SOLUTION INTRAVENOUS at 06:41

## 2024-01-31 RX ADMIN — ENOXAPARIN SODIUM 30 MG: 100 INJECTION SUBCUTANEOUS at 08:37

## 2024-01-31 RX ADMIN — ATORVASTATIN CALCIUM 40 MG: 40 TABLET, FILM COATED ORAL at 20:14

## 2024-01-31 RX ADMIN — FAMOTIDINE 20 MG: 20 TABLET ORAL at 20:14

## 2024-01-31 RX ADMIN — BACITRACIN: 500 OINTMENT TOPICAL at 20:15

## 2024-01-31 RX ADMIN — METOPROLOL TARTRATE 25 MG: 25 TABLET, FILM COATED ORAL at 20:14

## 2024-01-31 RX ADMIN — POTASSIUM CHLORIDE 20 MEQ: 1500 TABLET, EXTENDED RELEASE ORAL at 17:05

## 2024-01-31 RX ADMIN — MUPIROCIN: 20 OINTMENT TOPICAL at 20:17

## 2024-01-31 RX ADMIN — CHLORHEXIDINE GLUCONATE 0.12% ORAL RINSE 15 ML: 1.2 LIQUID ORAL at 08:38

## 2024-01-31 RX ADMIN — SODIUM CHLORIDE, PRESERVATIVE FREE 10 ML: 5 INJECTION INTRAVENOUS at 20:18

## 2024-01-31 RX ADMIN — Medication 1 TABLET: at 08:37

## 2024-01-31 RX ADMIN — CLOPIDOGREL BISULFATE 75 MG: 75 TABLET ORAL at 08:37

## 2024-01-31 RX ADMIN — SENNOSIDES, DOCUSATE SODIUM 1 TABLET: 8.6; 5 TABLET ORAL at 08:36

## 2024-01-31 RX ADMIN — IPRATROPIUM BROMIDE AND ALBUTEROL SULFATE 1 DOSE: 2.5; .5 SOLUTION RESPIRATORY (INHALATION) at 19:47

## 2024-01-31 RX ADMIN — FUROSEMIDE 40 MG: 10 INJECTION, SOLUTION INTRAMUSCULAR; INTRAVENOUS at 17:05

## 2024-01-31 RX ADMIN — METOPROLOL TARTRATE 12.5 MG: 25 TABLET, FILM COATED ORAL at 08:36

## 2024-01-31 RX ADMIN — SODIUM CHLORIDE 3000 MG: 900 INJECTION INTRAVENOUS at 05:26

## 2024-01-31 RX ADMIN — ACETAMINOPHEN 1000 MG: 500 TABLET ORAL at 05:25

## 2024-01-31 RX ADMIN — MUPIROCIN: 20 OINTMENT TOPICAL at 08:37

## 2024-01-31 RX ADMIN — CHLORHEXIDINE GLUCONATE 0.12% ORAL RINSE 15 ML: 1.2 LIQUID ORAL at 20:14

## 2024-01-31 RX ADMIN — SENNOSIDES, DOCUSATE SODIUM 1 TABLET: 8.6; 5 TABLET ORAL at 20:14

## 2024-01-31 RX ADMIN — SERTRALINE HYDROCHLORIDE 100 MG: 50 TABLET ORAL at 08:37

## 2024-01-31 RX ADMIN — INSULIN LISPRO 4 UNITS: 100 INJECTION, SOLUTION INTRAVENOUS; SUBCUTANEOUS at 16:36

## 2024-01-31 RX ADMIN — GABAPENTIN 300 MG: 300 CAPSULE ORAL at 08:37

## 2024-01-31 RX ADMIN — OXYCODONE HYDROCHLORIDE 5 MG: 5 TABLET ORAL at 16:59

## 2024-01-31 RX ADMIN — OXYCODONE HYDROCHLORIDE 5 MG: 5 TABLET ORAL at 09:17

## 2024-01-31 RX ADMIN — ASPIRIN 81 MG 81 MG: 81 TABLET ORAL at 08:37

## 2024-01-31 RX ADMIN — GABAPENTIN 300 MG: 300 CAPSULE ORAL at 14:34

## 2024-01-31 RX ADMIN — FUROSEMIDE 40 MG: 10 INJECTION, SOLUTION INTRAMUSCULAR; INTRAVENOUS at 08:57

## 2024-01-31 RX ADMIN — BACITRACIN: 500 OINTMENT TOPICAL at 11:41

## 2024-01-31 RX ADMIN — ACETAMINOPHEN 1000 MG: 500 TABLET ORAL at 22:46

## 2024-01-31 RX ADMIN — FAMOTIDINE 20 MG: 20 TABLET ORAL at 08:37

## 2024-01-31 RX ADMIN — METOPROLOL TARTRATE 12.5 MG: 25 TABLET, FILM COATED ORAL at 08:44

## 2024-01-31 RX ADMIN — INSULIN GLARGINE 10 UNITS: 100 INJECTION, SOLUTION SUBCUTANEOUS at 08:59

## 2024-01-31 RX ADMIN — BISACODYL 10 MG: 10 SUPPOSITORY RECTAL at 16:48

## 2024-01-31 RX ADMIN — IPRATROPIUM BROMIDE AND ALBUTEROL SULFATE 1 DOSE: 2.5; .5 SOLUTION RESPIRATORY (INHALATION) at 15:12

## 2024-01-31 RX ADMIN — FUROSEMIDE 40 MG: 10 INJECTION, SOLUTION INTRAMUSCULAR; INTRAVENOUS at 12:09

## 2024-01-31 RX ADMIN — ENOXAPARIN SODIUM 30 MG: 100 INJECTION SUBCUTANEOUS at 20:15

## 2024-01-31 RX ADMIN — POTASSIUM CHLORIDE 20 MEQ: 1500 TABLET, EXTENDED RELEASE ORAL at 08:59

## 2024-01-31 RX ADMIN — HYDROMORPHONE HYDROCHLORIDE 0.5 MG: 1 INJECTION, SOLUTION INTRAMUSCULAR; INTRAVENOUS; SUBCUTANEOUS at 12:43

## 2024-01-31 RX ADMIN — OXYCODONE HYDROCHLORIDE 5 MG: 5 TABLET ORAL at 22:46

## 2024-01-31 RX ADMIN — GABAPENTIN 300 MG: 300 CAPSULE ORAL at 20:14

## 2024-01-31 RX ADMIN — IPRATROPIUM BROMIDE AND ALBUTEROL SULFATE 1 DOSE: 2.5; .5 SOLUTION RESPIRATORY (INHALATION) at 11:43

## 2024-01-31 ASSESSMENT — PAIN SCALES - GENERAL
PAINLEVEL_OUTOF10: 7
PAINLEVEL_OUTOF10: 0
PAINLEVEL_OUTOF10: 2
PAINLEVEL_OUTOF10: 0
PAINLEVEL_OUTOF10: 10
PAINLEVEL_OUTOF10: 0
PAINLEVEL_OUTOF10: 8
PAINLEVEL_OUTOF10: 8
PAINLEVEL_OUTOF10: 0
PAINLEVEL_OUTOF10: 0
PAINLEVEL_OUTOF10: 8
PAINLEVEL_OUTOF10: 0

## 2024-01-31 ASSESSMENT — PAIN DESCRIPTION - DESCRIPTORS
DESCRIPTORS: ACHING

## 2024-01-31 ASSESSMENT — PAIN DESCRIPTION - FREQUENCY: FREQUENCY: INTERMITTENT

## 2024-01-31 ASSESSMENT — PAIN DESCRIPTION - ORIENTATION
ORIENTATION: ANTERIOR
ORIENTATION: MID

## 2024-01-31 ASSESSMENT — PAIN DESCRIPTION - PAIN TYPE
TYPE: SURGICAL PAIN
TYPE: ACUTE PAIN

## 2024-01-31 ASSESSMENT — PAIN - FUNCTIONAL ASSESSMENT
PAIN_FUNCTIONAL_ASSESSMENT: ACTIVITIES ARE NOT PREVENTED
PAIN_FUNCTIONAL_ASSESSMENT: ACTIVITIES ARE NOT PREVENTED
PAIN_FUNCTIONAL_ASSESSMENT: PREVENTS OR INTERFERES SOME ACTIVE ACTIVITIES AND ADLS
PAIN_FUNCTIONAL_ASSESSMENT: ACTIVITIES ARE NOT PREVENTED

## 2024-01-31 ASSESSMENT — PAIN DESCRIPTION - LOCATION
LOCATION: CHEST;INCISION
LOCATION: HEAD
LOCATION: HEAD
LOCATION: CHEST
LOCATION: CHEST

## 2024-01-31 ASSESSMENT — PAIN SCALES - WONG BAKER
WONGBAKER_NUMERICALRESPONSE: 0
WONGBAKER_NUMERICALRESPONSE: 0

## 2024-01-31 ASSESSMENT — PAIN DESCRIPTION - ONSET: ONSET: GRADUAL

## 2024-01-31 NOTE — PROGRESS NOTES
Physical Therapy    Physical Therapy Treatment Note  Name: Jose Figueroa MRN: [id number] :   1965   Date:  2024   Admission Date: 2024 Room:     Restrictions/Precautions:        Sternal, no pushing, pulling or lifting more than 5 pounds for the first 2 weeks and then 10 pounds up to 3 months,   Arms are to support chest when changing position, coughing or sneezing. No lifting arms above 90 degrees except with the ex's    Recommended discharge to ARU from [facility] on     Communication with other providers:  Renee JUNE states pt is ok to see for therapy  Subjective:  Patient states:  he is sore everywhere  Pain:   Location, Type, Intensity (0/10 to 10/10):  just had pain meds per nursing  Objective:    Observation:  pt was sitting up in the chair  Treatment, including education/measures:  Vital Signs  HR: 92, B/P 131/61, O2 92% on 5 L of NC O2  Education:  Pt and his family were instructed in Sternal Precautions, Purpose of Exercise Program, [place] Scale and Signs and Symptoms of Exercise Intolerance per Cardiac Protocol  Pt and his family were instructed in Intermediate Cardiac ex program:sitting  Overhead Side Stretch x 10  Ankle Pumps/ Heel Raises x 10  Marching Seated x 10  Forward Arm Raises x 10  Side Arm Raises x 10  Arm Crosses x 10  Arm Circles Forwards and Backwards x 10  SittingTrunkTwist x 10  Transfers with line management of IV's, CT x 2, tele, aiken  Scooting :SBA and VC's for sternal precautions  Sit to stand :min A of 1 and VC's for sternal precautions  Stand to sit :min A of 1 and VC's for sternal precautions  Gait:  Pt amb with CW for 120 ft with CGA  Pt needed VC's for posture, PLB, and pathway  Safety  Patient left safely in the chair, with call light/phone in reach with nursing in the room. Gait belt was used for transfers and gait.  Assessment / Impression:    Patient's tolerance of treatment:  good, receptive to education and ex   Adverse Reaction: none  Significant  no

## 2024-01-31 NOTE — PROGRESS NOTES
V2.0  [facility] Critical Care Progress Note      Name:  Jose Figueroa /Age/Sex: 1965  (58 y.o. male)   MRN & CSN:  [id number] & [id number] Encounter Date/Time: 2024 8:40 AM EST    Location:  -A PCP: Arabella Boles MD       Hospital Day: 3    Assessment and Plan:   Jose Figueroa is a 58 y.o. male with pmh of  Hypertension, hyperlipidemia, diabetes mellitus and previous stroke  now s/p CABG x 2     HTN  CAD  S/p CABG  DM on metformin    Plan:  - extubated per protocol.   - Pain better controlled today per patient  - on milrinone per CTS  - chest tube management per CTS  - fevers post op - on ancef    Hx of DM  - on metformin at home. A1C 6.6.   - will transition to high ISS and lantus 10     I have performed 50 minutes of critical care time, excluding and separately billable procedures      Diet ADULT DIET; Clear Liquid; 4 carb choices (60 gm/meal); Low Fat/Low Chol/High Fiber/2 gm Na   DVT Prophylaxis [] Lovenox, []  Heparin, [x] SCDs, [] Ambulation,  [] Eliquis, [] Xarelto  [] Coumadin   GI Prophylaxis [x] Yes          [] No   Code Status Full Code    Disposition Patient requires continued ICU care due to CABG     Subjective:     Awake and alert. OOBC. Pain more controlled per patient.   On insulin gtt at 8.          Review of Systems:    Review of Systems   Constitutional: Negative.    HENT: Negative.     Respiratory:  Positive for chest tightness.    Cardiovascular: Negative.    Gastrointestinal: Negative.    Genitourinary: Negative.    Musculoskeletal: Negative.            Objective:     Intake/Output Summary (Last 24 hours) at 2024 0821  Last data filed at 2024 0700  Gross per 24 hour   Intake 4896.84 ml   Output 3920 ml   Net 976.84 ml          Vitals:   Vitals:    24 0818   BP:    Pulse: 91   Resp: 19   Temp: 100 °F (37.8 °C)   SpO2: 92%       Physical Exam:     General: mild distress from pain  Eyes: EOMI  ENT: neck supple  Cardiovascular: Regular  01/31/24  4:59 AM   Result Value Ref Range    POC Glucose 114 (H) 70 - 99 MG/DL   POCT Glucose    Collection Time: 01/31/24  5:54 AM   Result Value Ref Range    POC Glucose 93 70 - 99 MG/DL   Calcium, Ionized    Collection Time: 01/31/24  6:00 AM   Result Value Ref Range    Ionized Ca 1.14 1.12 - 1.32 mMOL/L    Calcium, Ionized 4.56 4.48 - 5.28 MG/DL   Phosphorus    Collection Time: 01/31/24  6:00 AM   Result Value Ref Range    Phosphorus 3.1 2.5 - 4.9 MG/DL   Magnesium    Collection Time: 01/31/24  6:00 AM   Result Value Ref Range    Magnesium 2.2 1.8 - 2.4 mg/dl   Basic Metabolic Panel    Collection Time: 01/31/24  6:00 AM   Result Value Ref Range    Sodium 137 135 - 145 MMOL/L    Potassium 3.9 3.5 - 5.1 MMOL/L    Chloride 104 99 - 110 mMol/L    CO2 25 21 - 32 MMOL/L    Anion Gap 8 7 - 16    Glucose 89 70 - 99 MG/DL    BUN 12 6 - 23 MG/DL    Creatinine 1.2 0.9 - 1.3 MG/DL    Est, Glom Filt Rate >60 >60 mL/min/1.73m2    Calcium 7.7 (L) 8.3 - 10.6 MG/DL   CBC    Collection Time: 01/31/24  6:00 AM   Result Value Ref Range    WBC 12.2 (H) 4.0 - 10.5 K/CU MM    RBC 3.37 (L) 4.6 - 6.2 M/CU MM    Hemoglobin 9.5 (L) 13.5 - 18.0 GM/DL    Hematocrit 29.7 (L) 42 - 52 %    MCV 88.1 78 - 100 FL    MCH 28.2 27 - 31 PG    MCHC 32.0 32.0 - 36.0 %    RDW 12.4 11.7 - 14.9 %    Platelets 171 140 - 440 K/CU MM    MPV 9.6 7.5 - 11.1 FL   Protime-INR    Collection Time: 01/31/24  6:00 AM   Result Value Ref Range    Protime 15.8 (H) 11.7 - 14.5 SECONDS    INR 1.2 INDEX   APTT    Collection Time: 01/31/24  6:00 AM   Result Value Ref Range    aPTT 32.6 25.1 - 37.1 SECONDS   Fibrinogen    Collection Time: 01/31/24  6:00 AM   Result Value Ref Range    Fibrinogen 636 (H) 170 - 540 MG/DL   POCT Glucose    Collection Time: 01/31/24  6:52 AM   Result Value Ref Range    POC Glucose 155 (H) 70 - 99 MG/DL        Imaging/Diagnostics Last 24 Hours   XR CHEST PORTABLE    Result Date: 1/30/2024  EXAMINATION: ONE XRAY VIEW OF THE CHEST 1/30/2024 4:36

## 2024-01-31 NOTE — CARE COORDINATION
Chart reviewed. PT/O rec ARU. Contacted ARU liaison with referral. Will warrant precert. Kimberly Cabrera RN

## 2024-01-31 NOTE — PROGRESS NOTES
[facility] Cardiothoracic Surgery  Daily Progress Note    Surgeon:  Dr. Kaden Trejo       Subjective:  Mr. Figueroa is a 58 y.o. year-old male status post CORONARY ARTERY BYPASS GRAFT X2, LIMA - LAD, SVG - PDA; LEFT ENDOSCOPIC GREATER SAPHENOUS VEIN HARVEST; INTRAOPERATIVE TRANSESOPHAGEAL ECHOCARDIOGRAM  on 1/29/24.      Patient was seen and examined at bedside this morning without any complaints. Pain better controlled this morning      Vital Signs: /75   Pulse 92   Temp 100 °F (37.8 °C) (Bladder)   Resp 19   Ht 1.753 m (5' 9\")   Wt 128.9 kg (284 lb 3.2 oz)   SpO2 92%   BMI 41.97 kg/m²  O2 Flow Rate (L/min): 5 L/min   Admit Weight: Weight - Scale: 120.3 kg (265 lb 2 oz)   WEIGHTWeight - Scale: 128.9 kg (284 lb 3.2 oz)     I/O's:  I/O last 3 completed shifts:  In: 7228.4 [P.O.:2770; I.V.:2242.3; NG/GT:120; IV Piggyback:2096.1]  Out: 5938 [Urine:4295; Emesis/NG output:500; Chest Tube:1143]    Data:    CBC:   Recent Labs     01/30/24  0000 01/30/24  0153 01/30/24  0600 01/30/24  0817 01/30/24  0944 01/30/24  1105 01/31/24  0600   WBC 21.7*  --  21.1*  --   --   --  12.2*   HGB 10.5*   < > 10.7*   < > 10.8* 10.1* 9.5*   HCT 32.0*   < > 32.5*   < > 32.0* 30.0* 29.7*   MCV 87.0  --  86.2  --   --   --  88.1     --  284  --   --   --  171    < > = values in this interval not displayed.       BMP:   Recent Labs     01/30/24  0600 01/30/24  0651 01/30/24  1105 01/30/24  1900 01/31/24  0600      < > 142 138 137   K 4.3   < > 4.1 4.0 3.9     --   --  101 104   CO2 20*   < > 25 25 25   PHOS 1.6*  --   --  4.2 3.1   BUN 16  --   --  13 12   CREATININE 1.6*   < > 1.2 1.2 1.2    < > = values in this interval not displayed.       PT/INR:   Recent Labs     01/29/24  1810 01/30/24  0600 01/31/24  0600   PROTIME 15.7* 14.2 15.8*   INR 1.2 1.1 1.2       APTT:   Recent Labs     01/29/24  1810 01/30/24  0600 01/31/24  0600   APTT 28.3 26.0 32.6         Chest

## 2024-01-31 NOTE — CONSULTS
24 hour central line rounding on CVC & Introducer completed. Sterile dressing change completed per protocol. Patient continues to meet the following criteria for central vascular access: Hemodynamically unstable, requiring monitoring lines, vasopressors, or volume resuscitation    Consult the Vascular Access Team for questions, concerns, or change in patient's condition.

## 2024-01-31 NOTE — OP NOTE
1/29/2024    PATIENT: Jose Figueroa    PREOPERATIVE DIAGNOSIS:  Severe coronary artery disease.    POSTOPERATIVE DIAGNOSIS:  Severe coronary artery disease.     ASSISTANT:  RON Musa SA    OPERATION:  Left internal mammary artery graft to the mid left anterior descending coronary artery, saphenous vein bypass graft from the aorta to PDA coronary artery (two distal anastamosis) using cardiopulmonary bypass, mild hypothermia, cold cardioplegia.      FINDINGS:  The heart has severe hypertrophic cardiomyopathy and aorta were of normal size.  The LAD and PDA were 1.5 and 1.25 mm in diameter. PDA had mild calcification but the LAD was severely calcified.      EBL:  300 mL    PQRI measures:  1. Patient received pre-operative beta-blockers.  2. Patient received pre-operative antibiotics.  3. Internal mammary artery was used.    TECHNIQUE:  Routine pressure lines were inserted for monitoring.  Suitable segments of long saphenous vein were removed from the left leg using endoscopic harvesting technique.  A primary median sternotomy was performed.  The left internal mammary artery was taken down; this vessel had good caliber and flow.  Cardiopulmonary bypass was established with a single cannula in the ascending aorta and a single cannula in the right atrium.  When on bypass, the patient was cooled to 32 degrees centigrade.  The ascending aorta was cross-clamped, and cardioplegic solution was infused into the aortic root.  The heart was topically cooled with cold saline.  Antegrade cardioplegia was used.        The PDA coronary artery was identified and opened, and a segment of long saphenous vein was anastomosed to this artery.  The mid LAD was identified and opened, and the left internal mammary artery was run extrapericardially, brought through a slit in the pericardium and anastomosed to the LAD. The distal anastomoses were performed with continuous 7-0 Prolene (two distal anastomoses).  Rewarming

## 2024-01-31 NOTE — CONSULTS
Consult completed. Procedure/rationale explained to pt & consent obtained. #20ga Nexiva extra-long, 1.75\" PIV initiated using UltraSound-guided technique without difficulty/complications. #20ga Nexiva extra-long, 1.75\" PIV initiated as secondary access using UltraSound-guided technique without difficulty/complications. After 2x PIV's obtained verified Physician order for R IJ Introducer/CVC removal and that no culture was ordered - line removed per protocol and dressing applied/labeled - Primary RN notified and will continue to monitor pt, post line removal per protocol .Pt tolerated well and no other c/o or needs noted or reported.

## 2024-01-31 NOTE — CARE COORDINATION
Received referral for ARU.  Will review patients clinicals and PT/OT notes.  Thank you for the referral.

## 2024-01-31 NOTE — PROGRESS NOTES
Called and notified ERICA Andrade PA-C that patients pain is 7-8/10, -160's, RR low 30's.  Ordered Dilaudid 0.5mg IVP now, Oxy-IR 5mg PO q6hr PRN for moderate pain, d/c ultram, Hydralazine 10mg IVP q6h PRN SBP >160.  Patient updated on new orders.

## 2024-01-31 NOTE — PROGRESS NOTES
After Your Colonoscopy and EGD (Esophagogastroduodenoscopy) Instructions    DIET:  · Resume your usual diet as able  · Nausea may be expected for the first 24 to 48 hours.  Start eating a bland diet (toast, gelatin, 7-up, hot cereal, crackers, sherbet, broth and soup).  · Drink plenty of fluids (6-8 glasses of water a day).  · Avoid greasy or spicy foods for 24 hours.    ACTIVITY:  · Rest quietly at home for the remainder of the day. You may resume normal activities tomorrow.  · Do not do anything that requires coordination the day of the procedure, such as climbing ladders, using a sharp knife, operating machinery, driving.   · May resume driving and/or operating machinery in 24 hours  · May Shower tomorrow  · Follow for your well being safety (given & explained)  · Follow for your well being care after anesthesia or sedation (given & explained)       WHAT TO EXPECT:  · Mild abdominal discomfort, bloating and gas pains.  · Mild throat soreness.  Warm salt-water gargle or lozenges may relieve your discomfort.  · A scant amount of rectal bleeding following a biopsy, polypectomy or if you have hemorrhoids is normal.  · Return of your usual bowel movements in 1-2 days.  · A tired feeling after the procedure due to the medications given to help you relax.       PROBLEMS TO WATCH FOR - NOTIFY YOUR SURGEON IF YOU HAVE ANY OF THESE SYMPTOMS:  · Severe abdominal pain or bloating.  · Difficulty swallowing or breathing  · Neck swelling  · Excessive pain    · Signs of infection including chills or temperature over 101 degrees.  · Large amounts of bright red rectal bleeding, blood clots or black colored stool.  · Vomiting blood or black colored liquid.    FOLLOW -UP:  · If a specimen was taken, please allow at least 7-10  business days for pathology results.  · Someone will either call or send a letter with your pathology results.      If you have not received a letter or phone call, or have any  Patient refusing to get OOB and/or walk.  Notified of importance for early activity after surgery to prevent complications with verbal of understanding.  Still refusing to get OOB/walk.  Request to \"rest.\"   questions or concerns, please call   DR. Gonzalez [phone]    Patient/Family given For Your Well-Being Teaching Sheet:       x     Partners in Safety                        x    Care After Anesthesia/Sedation       x    Pain Management Tips

## 2024-01-31 NOTE — PROGRESS NOTES
Called and notified Dr Trejo of patients c/o chest/incision pain uncontrolled by current medications and -170's with grunting resp.  Ordered Morphine 2mg IVP x1 dose and call the on-call PA for further pain management.

## 2024-01-31 NOTE — PROGRESS NOTES
mellitus (HCC), Hyperlipidemia, and Hypertension.   has a past surgical history that includes brain surgery; Cardiac procedure (N/A, 1/9/2024); invasive vascular (N/A, 1/9/2024); and Coronary artery bypass graft (N/A, 1/29/2024).  Physical Exam:  General:  Awake, alert, NAD  Head:normal  Eye: Pupils equal and round  Neck:  No JVD, no carotid bruit noted   Chest:  Clear to auscultation, no signs of respiratory distress  Cardiovascular:  Normal rate and rhythm. S1 and S2 noted. No murmurs rubs or gallops  Abdomen:   nontender  Extremities: Trace edema  Pulses; palpable  Neuro: grossly normal    Medications:    metoprolol tartrate  12.5 mg Oral BID    sertraline  100 mg Oral Daily    bacitracin   Topical BID    gabapentin  300 mg Oral TID    sodium chloride flush  5-40 mL IntraVENous 2 times per day    enoxaparin  30 mg SubCUTAneous BID    clopidogrel  75 mg Oral Daily    acetaminophen  1,000 mg Oral Q6H    chlorhexidine  15 mL Mouth/Throat BID    mupirocin   Each Nostril BID    multivitamin  1 tablet Oral Daily with breakfast    polyethylene glycol  17 g Oral Daily    sennosides-docusate sodium  1 tablet Oral BID    atorvastatin  40 mg Oral Nightly    famotidine  20 mg Oral BID    Or    famotidine (PEPCID) injection  20 mg IntraVENous BID    ipratropium 0.5 mg-albuterol 2.5 mg  1 Dose Inhalation Q4H WA RT    aspirin  81 mg Oral Daily      sodium chloride Stopped (01/31/24 0641)    sodium chloride      phenylephrine 100 mcg/min (01/30/24 1005)    niCARdipine Stopped (01/29/24 1921)    insulin 4.44 Units/hr (01/31/24 0653)    dextrose      EPINEPHrine Stopped (01/30/24 1054)    milrinone 0.15 mcg/kg/min (01/31/24 0700)     oxyCODONE, hydrALAZINE, potassium chloride **OR** potassium alternative oral replacement **OR** potassium chloride, magnesium sulfate, sodium chloride flush, sodium chloride, ondansetron **OR** ondansetron, bisacodyl, potassium chloride, calcium chloride 1,000 mg in sodium chloride 0.9 % 100 mL IVPB

## 2024-01-31 NOTE — PROGRESS NOTES
Notified ERICA Andrade PA-C temp remains at 38.9.  Ordered to continue to monitor and will evaluate during morning rounds.

## 2024-02-01 ENCOUNTER — APPOINTMENT (OUTPATIENT)
Dept: GENERAL RADIOLOGY | Age: 59
DRG: 166 | End: 2024-02-01
Attending: THORACIC SURGERY (CARDIOTHORACIC VASCULAR SURGERY)
Payer: COMMERCIAL

## 2024-02-01 PROBLEM — R50.82 POSTSURGICAL FEVER: Status: ACTIVE | Noted: 2024-02-01

## 2024-02-01 LAB
ANION GAP SERPL CALCULATED.3IONS-SCNC: 7 MMOL/L (ref 7–16)
APTT: 33.5 SECONDS (ref 25.1–37.1)
BACTERIA: NEGATIVE /HPF
BILIRUBIN URINE: NEGATIVE MG/DL
BLOOD, URINE: ABNORMAL
BUN SERPL-MCNC: 13 MG/DL (ref 6–23)
CALCIUM IONIZED: 4.32 MG/DL (ref 4.48–5.28)
CALCIUM SERPL-MCNC: 7.5 MG/DL (ref 8.3–10.6)
CHLORIDE BLD-SCNC: 99 MMOL/L (ref 99–110)
CLARITY: CLEAR
CO2: 28 MMOL/L (ref 21–32)
COLOR: YELLOW
CREAT SERPL-MCNC: 1.1 MG/DL (ref 0.9–1.3)
CRP SERPL HS-MCNC: 244.1 MG/L
FIBRINOGEN LEVEL: 733 MG/DL (ref 170–540)
GFR SERPL CREATININE-BSD FRML MDRD: >60 ML/MIN/1.73M2
GLUCOSE BLD-MCNC: 168 MG/DL (ref 70–99)
GLUCOSE BLD-MCNC: 179 MG/DL (ref 70–99)
GLUCOSE BLD-MCNC: 185 MG/DL (ref 70–99)
GLUCOSE BLD-MCNC: 187 MG/DL (ref 70–99)
GLUCOSE BLD-MCNC: 199 MG/DL (ref 70–99)
GLUCOSE BLD-MCNC: 252 MG/DL (ref 70–99)
GLUCOSE SERPL-MCNC: 163 MG/DL (ref 70–99)
GLUCOSE, URINE: 500 MG/DL
HCT VFR BLD CALC: 29.8 % (ref 42–52)
HEMOGLOBIN: 9.6 GM/DL (ref 13.5–18)
INR BLD: 1.2 INDEX
IONIZED CA: 1.08 MMOL/L (ref 1.12–1.32)
KETONES, URINE: NEGATIVE MG/DL
LEUKOCYTE ESTERASE, URINE: NEGATIVE
MAGNESIUM: 2.2 MG/DL (ref 1.8–2.4)
MCH RBC QN AUTO: 28.1 PG (ref 27–31)
MCHC RBC AUTO-ENTMCNC: 32.2 % (ref 32–36)
MCV RBC AUTO: 87.1 FL (ref 78–100)
NITRITE URINE, QUANTITATIVE: NEGATIVE
PDW BLD-RTO: 12.1 % (ref 11.7–14.9)
PH, URINE: 6 (ref 5–8)
PHOSPHORUS: 2.8 MG/DL (ref 2.5–4.9)
PLATELET # BLD: 158 K/CU MM (ref 140–440)
PMV BLD AUTO: 9.6 FL (ref 7.5–11.1)
POTASSIUM SERPL-SCNC: 4.2 MMOL/L (ref 3.5–5.1)
PROCALCITONIN SERPL-MCNC: 0.28 NG/ML
PROTEIN UA: NEGATIVE MG/DL
PROTHROMBIN TIME: 15.7 SECONDS (ref 11.7–14.5)
RBC # BLD: 3.42 M/CU MM (ref 4.6–6.2)
RBC URINE: 8 /HPF (ref 0–3)
SODIUM BLD-SCNC: 134 MMOL/L (ref 135–145)
SPECIFIC GRAVITY UA: 1.01 (ref 1–1.03)
SQUAMOUS EPITHELIAL: <1 /HPF
TRICHOMONAS: ABNORMAL /HPF
UROBILINOGEN, URINE: 0.2 MG/DL (ref 0.2–1)
WBC # BLD: 9.2 K/CU MM (ref 4–10.5)
WBC UA: 3 /HPF (ref 0–2)

## 2024-02-01 PROCEDURE — 6370000000 HC RX 637 (ALT 250 FOR IP): Performed by: PHYSICIAN ASSISTANT

## 2024-02-01 PROCEDURE — 6360000002 HC RX W HCPCS: Performed by: PHYSICIAN ASSISTANT

## 2024-02-01 PROCEDURE — 82330 ASSAY OF CALCIUM: CPT

## 2024-02-01 PROCEDURE — 94664 DEMO&/EVAL PT USE INHALER: CPT

## 2024-02-01 PROCEDURE — 94150 VITAL CAPACITY TEST: CPT

## 2024-02-01 PROCEDURE — 6370000000 HC RX 637 (ALT 250 FOR IP): Performed by: STUDENT IN AN ORGANIZED HEALTH CARE EDUCATION/TRAINING PROGRAM

## 2024-02-01 PROCEDURE — 85610 PROTHROMBIN TIME: CPT

## 2024-02-01 PROCEDURE — 2060000000 HC ICU INTERMEDIATE R&B

## 2024-02-01 PROCEDURE — 87040 BLOOD CULTURE FOR BACTERIA: CPT

## 2024-02-01 PROCEDURE — 87070 CULTURE OTHR SPECIMN AEROBIC: CPT

## 2024-02-01 PROCEDURE — 97110 THERAPEUTIC EXERCISES: CPT

## 2024-02-01 PROCEDURE — 85027 COMPLETE CBC AUTOMATED: CPT

## 2024-02-01 PROCEDURE — APPSS60 APP SPLIT SHARED TIME 46-60 MINUTES: Performed by: NURSE PRACTITIONER

## 2024-02-01 PROCEDURE — 80048 BASIC METABOLIC PNL TOTAL CA: CPT

## 2024-02-01 PROCEDURE — 86140 C-REACTIVE PROTEIN: CPT

## 2024-02-01 PROCEDURE — 94640 AIRWAY INHALATION TREATMENT: CPT

## 2024-02-01 PROCEDURE — 2700000000 HC OXYGEN THERAPY PER DAY

## 2024-02-01 PROCEDURE — 94669 MECHANICAL CHEST WALL OSCILL: CPT

## 2024-02-01 PROCEDURE — 83735 ASSAY OF MAGNESIUM: CPT

## 2024-02-01 PROCEDURE — 71045 X-RAY EXAM CHEST 1 VIEW: CPT

## 2024-02-01 PROCEDURE — 99255 IP/OBS CONSLTJ NEW/EST HI 80: CPT | Performed by: INTERNAL MEDICINE

## 2024-02-01 PROCEDURE — 85384 FIBRINOGEN ACTIVITY: CPT

## 2024-02-01 PROCEDURE — 2100000000 HC CCU R&B

## 2024-02-01 PROCEDURE — 85730 THROMBOPLASTIN TIME PARTIAL: CPT

## 2024-02-01 PROCEDURE — 84145 PROCALCITONIN (PCT): CPT

## 2024-02-01 PROCEDURE — 87205 SMEAR GRAM STAIN: CPT

## 2024-02-01 PROCEDURE — 94761 N-INVAS EAR/PLS OXIMETRY MLT: CPT

## 2024-02-01 PROCEDURE — 84100 ASSAY OF PHOSPHORUS: CPT

## 2024-02-01 PROCEDURE — 97116 GAIT TRAINING THERAPY: CPT

## 2024-02-01 PROCEDURE — 99232 SBSQ HOSP IP/OBS MODERATE 35: CPT | Performed by: INTERNAL MEDICINE

## 2024-02-01 PROCEDURE — 81001 URINALYSIS AUTO W/SCOPE: CPT

## 2024-02-01 PROCEDURE — 2580000003 HC RX 258: Performed by: PHYSICIAN ASSISTANT

## 2024-02-01 PROCEDURE — 6370000000 HC RX 637 (ALT 250 FOR IP): Performed by: THORACIC SURGERY (CARDIOTHORACIC VASCULAR SURGERY)

## 2024-02-01 PROCEDURE — 82962 GLUCOSE BLOOD TEST: CPT

## 2024-02-01 RX ORDER — INSULIN LISPRO 100 [IU]/ML
0-16 INJECTION, SOLUTION INTRAVENOUS; SUBCUTANEOUS
Status: DISCONTINUED | OUTPATIENT
Start: 2024-02-01 | End: 2024-02-06 | Stop reason: HOSPADM

## 2024-02-01 RX ORDER — FUROSEMIDE 10 MG/ML
40 INJECTION INTRAMUSCULAR; INTRAVENOUS DAILY
Status: DISCONTINUED | OUTPATIENT
Start: 2024-02-02 | End: 2024-02-05

## 2024-02-01 RX ORDER — FUROSEMIDE 10 MG/ML
INJECTION INTRAMUSCULAR; INTRAVENOUS
Status: DISPENSED
Start: 2024-02-01 | End: 2024-02-01

## 2024-02-01 RX ORDER — FUROSEMIDE 10 MG/ML
40 INJECTION INTRAMUSCULAR; INTRAVENOUS ONCE
Status: COMPLETED | OUTPATIENT
Start: 2024-02-01 | End: 2024-02-01

## 2024-02-01 RX ORDER — POTASSIUM CHLORIDE 20 MEQ/1
20 TABLET, EXTENDED RELEASE ORAL ONCE
Status: COMPLETED | OUTPATIENT
Start: 2024-02-01 | End: 2024-02-01

## 2024-02-01 RX ORDER — POTASSIUM CHLORIDE 20 MEQ/1
20 TABLET, EXTENDED RELEASE ORAL
Status: DISCONTINUED | OUTPATIENT
Start: 2024-02-02 | End: 2024-02-05

## 2024-02-01 RX ORDER — INSULIN GLARGINE 100 [IU]/ML
10 INJECTION, SOLUTION SUBCUTANEOUS 2 TIMES DAILY
Status: DISCONTINUED | OUTPATIENT
Start: 2024-02-01 | End: 2024-02-02

## 2024-02-01 RX ADMIN — ENOXAPARIN SODIUM 30 MG: 100 INJECTION SUBCUTANEOUS at 20:20

## 2024-02-01 RX ADMIN — FAMOTIDINE 20 MG: 20 TABLET ORAL at 09:31

## 2024-02-01 RX ADMIN — ASPIRIN 81 MG 81 MG: 81 TABLET ORAL at 09:30

## 2024-02-01 RX ADMIN — IPRATROPIUM BROMIDE AND ALBUTEROL SULFATE 1 DOSE: 2.5; .5 SOLUTION RESPIRATORY (INHALATION) at 20:04

## 2024-02-01 RX ADMIN — ATORVASTATIN CALCIUM 40 MG: 40 TABLET, FILM COATED ORAL at 20:20

## 2024-02-01 RX ADMIN — IPRATROPIUM BROMIDE AND ALBUTEROL SULFATE 1 DOSE: 2.5; .5 SOLUTION RESPIRATORY (INHALATION) at 15:18

## 2024-02-01 RX ADMIN — CHLORHEXIDINE GLUCONATE 0.12% ORAL RINSE 15 ML: 1.2 LIQUID ORAL at 20:20

## 2024-02-01 RX ADMIN — FAMOTIDINE 20 MG: 20 TABLET ORAL at 20:20

## 2024-02-01 RX ADMIN — GABAPENTIN 300 MG: 300 CAPSULE ORAL at 15:56

## 2024-02-01 RX ADMIN — OXYCODONE HYDROCHLORIDE 5 MG: 5 TABLET ORAL at 18:41

## 2024-02-01 RX ADMIN — METOPROLOL TARTRATE 25 MG: 25 TABLET, FILM COATED ORAL at 09:31

## 2024-02-01 RX ADMIN — MUPIROCIN: 20 OINTMENT TOPICAL at 09:46

## 2024-02-01 RX ADMIN — POTASSIUM CHLORIDE 20 MEQ: 1500 TABLET, EXTENDED RELEASE ORAL at 09:43

## 2024-02-01 RX ADMIN — SERTRALINE HYDROCHLORIDE 100 MG: 50 TABLET ORAL at 09:31

## 2024-02-01 RX ADMIN — CLOPIDOGREL BISULFATE 75 MG: 75 TABLET ORAL at 09:30

## 2024-02-01 RX ADMIN — INSULIN GLARGINE 10 UNITS: 100 INJECTION, SOLUTION SUBCUTANEOUS at 09:40

## 2024-02-01 RX ADMIN — GABAPENTIN 300 MG: 300 CAPSULE ORAL at 20:20

## 2024-02-01 RX ADMIN — IPRATROPIUM BROMIDE AND ALBUTEROL SULFATE 1 DOSE: 2.5; .5 SOLUTION RESPIRATORY (INHALATION) at 11:06

## 2024-02-01 RX ADMIN — INSULIN LISPRO 4 UNITS: 100 INJECTION, SOLUTION INTRAVENOUS; SUBCUTANEOUS at 16:41

## 2024-02-01 RX ADMIN — BACITRACIN: 500 OINTMENT TOPICAL at 09:46

## 2024-02-01 RX ADMIN — INSULIN LISPRO 8 UNITS: 100 INJECTION, SOLUTION INTRAVENOUS; SUBCUTANEOUS at 09:42

## 2024-02-01 RX ADMIN — INSULIN LISPRO 2 UNITS: 100 INJECTION, SOLUTION INTRAVENOUS; SUBCUTANEOUS at 06:30

## 2024-02-01 RX ADMIN — OXYCODONE HYDROCHLORIDE 5 MG: 5 TABLET ORAL at 11:04

## 2024-02-01 RX ADMIN — METOPROLOL TARTRATE 25 MG: 25 TABLET, FILM COATED ORAL at 20:20

## 2024-02-01 RX ADMIN — IPRATROPIUM BROMIDE AND ALBUTEROL SULFATE 1 DOSE: 2.5; .5 SOLUTION RESPIRATORY (INHALATION) at 07:07

## 2024-02-01 RX ADMIN — CALCIUM GLUCONATE 2000 MG: 98 INJECTION, SOLUTION INTRAVENOUS at 10:00

## 2024-02-01 RX ADMIN — CHLORHEXIDINE GLUCONATE 0.12% ORAL RINSE 15 ML: 1.2 LIQUID ORAL at 09:30

## 2024-02-01 RX ADMIN — Medication 1 TABLET: at 09:31

## 2024-02-01 RX ADMIN — SODIUM CHLORIDE, PRESERVATIVE FREE 10 ML: 5 INJECTION INTRAVENOUS at 09:47

## 2024-02-01 RX ADMIN — ACETAMINOPHEN 1000 MG: 500 TABLET ORAL at 06:30

## 2024-02-01 RX ADMIN — OXYCODONE HYDROCHLORIDE 5 MG: 5 TABLET ORAL at 06:30

## 2024-02-01 RX ADMIN — INSULIN GLARGINE 10 UNITS: 100 INJECTION, SOLUTION SUBCUTANEOUS at 20:20

## 2024-02-01 RX ADMIN — INSULIN LISPRO 4 UNITS: 100 INJECTION, SOLUTION INTRAVENOUS; SUBCUTANEOUS at 11:33

## 2024-02-01 RX ADMIN — MUPIROCIN: 20 OINTMENT TOPICAL at 20:21

## 2024-02-01 RX ADMIN — ENOXAPARIN SODIUM 30 MG: 100 INJECTION SUBCUTANEOUS at 09:30

## 2024-02-01 RX ADMIN — POLYETHYLENE GLYCOL (3350) 17 G: 17 POWDER, FOR SOLUTION ORAL at 09:36

## 2024-02-01 RX ADMIN — SENNOSIDES, DOCUSATE SODIUM 1 TABLET: 8.6; 5 TABLET ORAL at 20:20

## 2024-02-01 RX ADMIN — BACITRACIN: 500 OINTMENT TOPICAL at 20:21

## 2024-02-01 RX ADMIN — INSULIN LISPRO 2 UNITS: 100 INJECTION, SOLUTION INTRAVENOUS; SUBCUTANEOUS at 01:50

## 2024-02-01 RX ADMIN — INSULIN LISPRO 4 UNITS: 100 INJECTION, SOLUTION INTRAVENOUS; SUBCUTANEOUS at 20:20

## 2024-02-01 RX ADMIN — FUROSEMIDE 40 MG: 10 INJECTION, SOLUTION INTRAMUSCULAR; INTRAVENOUS at 09:43

## 2024-02-01 RX ADMIN — SODIUM CHLORIDE, PRESERVATIVE FREE 10 ML: 5 INJECTION INTRAVENOUS at 21:43

## 2024-02-01 RX ADMIN — ACETAMINOPHEN 1000 MG: 500 TABLET ORAL at 21:43

## 2024-02-01 RX ADMIN — SENNOSIDES, DOCUSATE SODIUM 1 TABLET: 8.6; 5 TABLET ORAL at 09:30

## 2024-02-01 RX ADMIN — ACETAMINOPHEN 1000 MG: 500 TABLET ORAL at 15:56

## 2024-02-01 RX ADMIN — GABAPENTIN 300 MG: 300 CAPSULE ORAL at 09:30

## 2024-02-01 ASSESSMENT — PAIN DESCRIPTION - LOCATION
LOCATION: HEAD
LOCATION: CHEST
LOCATION: CHEST;INCISION

## 2024-02-01 ASSESSMENT — PAIN DESCRIPTION - ORIENTATION
ORIENTATION: MID

## 2024-02-01 ASSESSMENT — PAIN - FUNCTIONAL ASSESSMENT
PAIN_FUNCTIONAL_ASSESSMENT: ACTIVITIES ARE NOT PREVENTED

## 2024-02-01 ASSESSMENT — PAIN DESCRIPTION - DESCRIPTORS
DESCRIPTORS: ACHING

## 2024-02-01 ASSESSMENT — PAIN SCALES - GENERAL
PAINLEVEL_OUTOF10: 7
PAINLEVEL_OUTOF10: 0
PAINLEVEL_OUTOF10: 0
PAINLEVEL_OUTOF10: 9
PAINLEVEL_OUTOF10: 8
PAINLEVEL_OUTOF10: 0
PAINLEVEL_OUTOF10: 0

## 2024-02-01 ASSESSMENT — PAIN SCALES - WONG BAKER
WONGBAKER_NUMERICALRESPONSE: 0

## 2024-02-01 NOTE — PROGRESS NOTES
surgical history that includes brain surgery; Cardiac procedure (N/A, 1/9/2024); invasive vascular (N/A, 1/9/2024); and Coronary artery bypass graft (N/A, 1/29/2024).  Physical Exam:  General:  Awake, alert, NAD  Head:normal  Eye: Pupils equal and round  Neck:  No JVD, no carotid bruit noted   Chest:  Clear to auscultation, no signs of respiratory distress  Cardiovascular:  Normal rate and rhythm. S1 and S2 noted. No murmurs rubs or gallops  Abdomen:   nontender  Extremities: Trace edema  Pulses; palpable  Neuro: grossly normal    Medications:    [START ON 2/2/2024] furosemide  40 mg IntraVENous Daily    [START ON 2/2/2024] potassium chloride  20 mEq Oral Daily with breakfast    insulin glargine  10 Units SubCUTAneous BID    insulin lispro  0-16 Units SubCUTAneous 4x Daily WC    metoprolol tartrate  25 mg Oral BID    acetaminophen  1,000 mg Oral 3 times per day    sertraline  100 mg Oral Daily    bacitracin   Topical BID    gabapentin  300 mg Oral TID    sodium chloride flush  5-40 mL IntraVENous 2 times per day    enoxaparin  30 mg SubCUTAneous BID    clopidogrel  75 mg Oral Daily    chlorhexidine  15 mL Mouth/Throat BID    mupirocin   Each Nostril BID    multivitamin  1 tablet Oral Daily with breakfast    polyethylene glycol  17 g Oral Daily    sennosides-docusate sodium  1 tablet Oral BID    atorvastatin  40 mg Oral Nightly    famotidine  20 mg Oral BID    Or    famotidine (PEPCID) injection  20 mg IntraVENous BID    ipratropium 0.5 mg-albuterol 2.5 mg  1 Dose Inhalation Q4H WA RT    aspirin  81 mg Oral Daily      sodium chloride      phenylephrine 100 mcg/min (01/30/24 1005)    dextrose       oxyCODONE, hydrALAZINE, potassium chloride **OR** potassium alternative oral replacement **OR** potassium chloride, magnesium sulfate, sodium chloride flush, sodium chloride, ondansetron **OR** ondansetron, bisacodyl, potassium chloride, calcium chloride 1,000 mg in sodium chloride 0.9 % 100 mL IVPB **OR** calcium chloride  2,000 mg in sodium chloride 0.9 % 100 mL IVPB, albumin human 5%, phenylephrine, glucose, dextrose bolus **OR** dextrose bolus, glucagon (rDNA), dextrose    Lab Data:  CBC:   Recent Labs     01/30/24  0600 01/30/24  0817 01/30/24  1105 01/31/24  0600 02/01/24  0455   WBC 21.1*  --   --  12.2* 9.2   HGB 10.7*   < > 10.1* 9.5* 9.6*   HCT 32.5*   < > 30.0* 29.7* 29.8*   MCV 86.2  --   --  88.1 87.1     --   --  171 158    < > = values in this interval not displayed.     BMP:   Recent Labs     01/30/24  1900 01/31/24  0600 02/01/24  0455    137 134*   K 4.0 3.9 4.2    104 99   CO2 25 25 28   PHOS 4.2 3.1 2.8   BUN 13 12 13   CREATININE 1.2 1.2 1.1     LIVER PROFILE: No results for input(s): \"AST\", \"ALT\", \"LIPASE\", \"AMYLASE\", \"ALB\", \"BILIDIR\", \"BILITOT\", \"ALKPHOS\" in the last 72 hours.  PT/INR:   Recent Labs     01/30/24  0600 01/31/24  0600 02/01/24  0455   PROTIME 14.2 15.8* 15.7*   INR 1.1 1.2 1.2     APTT:   Recent Labs     01/30/24  0600 01/31/24  0600 02/01/24  0455   APTT 26.0 32.6 33.5     BNP:  No results for input(s): \"BNP\" in the last 72 hours.  TROPONIN: No results for input(s): \"TROPONINI\" in the last 72 hours. No results for input(s): \"TROPONINT\" in the last 72 hours.     Labs, consult, tests reviewed                    Bradley Jim MD, PA-C 2/1/2024 3:05 PM     Please note this report has been partially produced using speech recognition software and may contain errors related to that system including errors in grammar, punctuation, and spelling, as well as words and phrases that may be inappropriate. If there are any questions or concerns please feel free to contact the dictating provider for clarification.

## 2024-02-01 NOTE — PROGRESS NOTES
Physical Therapy    Physical Therapy Treatment Note  Name: Jose Figueroa MRN: [id number] :   1965   Date:  2024   Admission Date: 2024 Room:  A   Restrictions/Precautions:        Sternal, no pushing, pulling or lifting more than 5 pounds for the first 2 weeks and then 10 pounds up to 3 months,   Arms are to support chest when changing position, coughing or sneezing. No lifting arms above 90 degrees except with the ex's  Communication with other providers:  Renee JUNE states pt is ok to see for therapy  Subjective:  Patient states:  he is ready to move  Pain:   Location, Type, Intensity (0/10 to 10/10):  0/10  Objective:    Observation:  pt was sitting up in the chair  Treatment, including education/measures:  Vital Signs  HR: 94, B/P 129/97, O2 95% on 2l of NC O2  Education:  Pt was re- instructed in Sternal Precautions, Purpose of Exercise Program,  Pt was instructed in Intermediate Cardiac ex program:sitting  Overhead Side Stretch x 10  Ankle Pumps/ Heel Raises x 10  Marching Seated x 10  Forward Arm Raises x 10  Side Arm Raises x 10  Arm Crosses x 10  Arm Circles Forwards and Backwards x 10  SittingTrunkTwist x 10  Transfers with line management of IV, tele, CT x 2, aiken  Scooting :SBA and VC's for sternal precautions  Sit to stand :min ot mod A of 1 and VC's for sternal precautions  Stand to sit : min A of 1 and VC's for sternal precautions  Gait:  Pt amb with CW for 200 ft with min A of 1 for steering CW and 3 standing rest breaks for sob   Pt needed VC's for posture frequently with gt, PLB and pathway  Safety  Patient left safely in the chair, with call light/phone in reach with nursing in the room. Gait belt was used for transfers and gait.  Assessment / Impression:    Patient's tolerance of treatment:  good, remembered some education from yesterday   Adverse Reaction: none  Significant change in status and impact:  progressing well, has sob with gt and decreased

## 2024-02-01 NOTE — PROGRESS NOTES
[facility] Cardiothoracic Surgery  Daily Progress Note    Surgeon:  Dr. Kaden Trejo       Subjective:  Mr. Figueroa is a 58 y.o. year-old male status post CORONARY ARTERY BYPASS GRAFT X2, LIMA - LAD, SVG - PDA; LEFT ENDOSCOPIC GREATER SAPHENOUS VEIN HARVEST; INTRAOPERATIVE TRANSESOPHAGEAL ECHOCARDIOGRAM  on 1/29/24.      Patient was seen and examined at bedside this morning without any complaints. Temp up to 100.6 overnight after CVC removed, despite scheduled tylenol.    Hospital Course:  1/30/24 extubated to NC overnight. Wean dheeraj then epi for goal MAP>65. 500cc albumin 5%. Keep chest tubes and central line.    1/31/24 Increase metoprolol to 25mg PO BID. Lasix 40mg + K 20meq IV BID today. Extra 40 IV lasix this morning for total of 80 mg. Discontinue milrinone   2/1/24    Vital Signs: BP (!) 129/97   Pulse 94   Temp 100.2 °F (37.9 °C) (Bladder)   Resp 19   Ht 1.753 m (5' 9\")   Wt 126.1 kg (278 lb)   SpO2 95%   BMI 41.05 kg/m²  O2 Flow Rate (L/min): 2 L/min   Admit Weight: Weight - Scale: 120.3 kg (265 lb 2 oz)   WEIGHTWeight - Scale: 126.1 kg (278 lb)     I/O's:  I/O last 3 completed shifts:  In: 3698.9 [P.O.:930; I.V.:1525.5; IV Piggyback:1243.4]  Out: 6955 [Urine:5835; Chest Tube:1120]    Data:    CBC:   Recent Labs     01/30/24  0600 01/30/24  0817 01/30/24  1105 01/31/24  0600 02/01/24  0455   WBC 21.1*  --   --  12.2* 9.2   HGB 10.7*   < > 10.1* 9.5* 9.6*   HCT 32.5*   < > 30.0* 29.7* 29.8*   MCV 86.2  --   --  88.1 87.1     --   --  171 158    < > = values in this interval not displayed.       BMP:   Recent Labs     01/30/24  1900 01/31/24  0600 02/01/24  0455    137 134*   K 4.0 3.9 4.2    104 99   CO2 25 25 28   PHOS 4.2 3.1 2.8   BUN 13 12 13   CREATININE 1.2 1.2 1.1       PT/INR:   Recent Labs     01/30/24  0600 01/31/24  0600 02/01/24  0455   PROTIME 14.2 15.8* 15.7*   INR 1.1 1.2 1.2       APTT:   Recent Labs     01/30/24  0600
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
## 2024-02-01 NOTE — PROGRESS NOTES
V2.0  [facility] [place] [facility] Progress Note      Name:  Jose Figueroa /Age/Sex: 1965  (58 y.o. male)   MRN & CSN:  [id number] & [id number] Encounter Date/Time: 2024 8:40 AM EST    Location:  -A PCP: Arabella Boles MD       Hospital Day: 4    Assessment and Plan:   Jose Figueroa is a 58 y.o. male with pmh of  Hypertension, hyperlipidemia, diabetes mellitus and previous stroke  now s/p CABG x 2     HTN  CAD  S/p CABG  DM on metformin    Plan:  - extubated per protocol.   - Pain better controlled today per patient  - off pressors and milrinone  - chest tube management per CTS  - fevers post op - was on ancef, now stopped. No leukocytosis.  Monitor off abx. Can check LE dopplers    Hx of DM  - on metformin at home. A1C 6.6.   - on high ISS and lantus 10, FS still elevated. Will increase to Lantus 10q12 and switch ISS to ACHS    I have performed 50 minutes of critical care time, excluding and separately billable procedures      Diet ADULT DIET; Regular; 4 carb choices (60 gm/meal); Low Fat/Low Chol/High Fiber/2 gm Na   DVT Prophylaxis [] Lovenox, []  Heparin, [x] SCDs, [] Ambulation,  [] Eliquis, [] Xarelto  [] Coumadin   GI Prophylaxis [x] Yes          [] No   Code Status Full Code    Disposition Patient requires continued ICU care due to CABG     Subjective:     Awake and alert. OOBC. Pain more controlled per patient.   FS remains high despite q4 ISS and lantus coverage.   Still with low grade temps, no leukocytosis,      Review of Systems:    Review of Systems   Constitutional: Negative.    HENT: Negative.     Respiratory:  Positive for chest tightness.    Cardiovascular: Negative.    Gastrointestinal: Negative.    Genitourinary: Negative.    Musculoskeletal: Negative.            Objective:     Intake/Output Summary (Last 24 hours) at 2024 1008  Last data filed at 2024 0900  Gross per 24 hour   Intake 364.94 ml   Output 5860 ml   Net -5495.06 ml          Vitals:   Vitals:    24  PRN   Or  ondansetron, 4 mg, Q6H PRN  bisacodyl, 10 mg, Daily PRN  potassium chloride, 20 mEq, PRN  calcium chloride 1,000 mg in sodium chloride 0.9 % 100 mL IVPB, 1,000 mg, PRN   Or  calcium chloride 2,000 mg in sodium chloride 0.9 % 100 mL IVPB, 2,000 mg, PRN  albumin human 5%, 12.5 g, PRN  phenylephrine,  mcg/min, Continuous PRN  niCARdipine, 2.5-15 mg/hr, Continuous PRN  glucose, 4 tablet, PRN  dextrose bolus, 125 mL, PRN   Or  dextrose bolus, 250 mL, PRN  glucagon (rDNA), 1 mg, PRN  dextrose, , Continuous PRN        Labs      Recent Results (from the past 24 hour(s))   POCT Glucose    Collection Time: 01/31/24 10:42 AM   Result Value Ref Range    POC Glucose 123 (H) 70 - 99 MG/DL   POCT Glucose    Collection Time: 01/31/24 11:49 AM   Result Value Ref Range    POC Glucose 147 (H) 70 - 99 MG/DL   POCT Glucose    Collection Time: 01/31/24  4:22 PM   Result Value Ref Range    POC Glucose 223 (H) 70 - 99 MG/DL   POCT Glucose    Collection Time: 01/31/24  8:02 PM   Result Value Ref Range    POC Glucose 185 (H) 70 - 99 MG/DL   POCT Glucose    Collection Time: 02/01/24  1:43 AM   Result Value Ref Range    POC Glucose 185 (H) 70 - 99 MG/DL   Calcium, Ionized    Collection Time: 02/01/24  4:55 AM   Result Value Ref Range    Ionized Ca 1.08 (L) 1.12 - 1.32 mMOL/L    Calcium, Ionized 4.32 (L) 4.48 - 5.28 MG/DL   Phosphorus    Collection Time: 02/01/24  4:55 AM   Result Value Ref Range    Phosphorus 2.8 2.5 - 4.9 MG/DL   Magnesium    Collection Time: 02/01/24  4:55 AM   Result Value Ref Range    Magnesium 2.2 1.8 - 2.4 mg/dl   Basic Metabolic Panel    Collection Time: 02/01/24  4:55 AM   Result Value Ref Range    Sodium 134 (L) 135 - 145 MMOL/L    Potassium 4.2 3.5 - 5.1 MMOL/L    Chloride 99 99 - 110 mMol/L    CO2 28 21 - 32 MMOL/L    Anion Gap 7 7 - 16    Glucose 163 (H) 70 - 99 MG/DL    BUN 13 6 - 23 MG/DL    Creatinine 1.1 0.9 - 1.3 MG/DL    Est, Glom Filt Rate >60 >60 mL/min/1.73m2    Calcium 7.5 (L) 8.3 - 10.6

## 2024-02-01 NOTE — PROGRESS NOTES
Ritika VELASQUEZ called at this time. Notified that patient was still febrile, bladder temp 100.8, oral temp 98.4. Notified that central access was removed and now has peripheral access. No new orders at this time.    Electronically signed by Yohana Brandon RN on 2/1/2024 at 12:29 AM

## 2024-02-01 NOTE — PLAN OF CARE
Problem: Pain  Goal: Verbalizes/displays adequate comfort level or baseline comfort level  Outcome: Progressing     Problem: Safety - Adult  Goal: Free from fall injury  Outcome: Progressing     Problem: Cardiovascular - Adult  Goal: Maintains optimal cardiac output and hemodynamic stability  Outcome: Progressing     Problem: Skin/Tissue Integrity - Adult  Goal: Skin integrity remains intact  Outcome: Progressing

## 2024-02-02 ENCOUNTER — TELEPHONE (OUTPATIENT)
Dept: CARDIOTHORACIC SURGERY | Age: 59
End: 2024-02-02

## 2024-02-02 ENCOUNTER — APPOINTMENT (OUTPATIENT)
Dept: GENERAL RADIOLOGY | Age: 59
DRG: 166 | End: 2024-02-02
Attending: THORACIC SURGERY (CARDIOTHORACIC VASCULAR SURGERY)
Payer: COMMERCIAL

## 2024-02-02 LAB
ANION GAP SERPL CALCULATED.3IONS-SCNC: 11 MMOL/L (ref 7–16)
APTT: 34.3 SECONDS (ref 25.1–37.1)
BUN SERPL-MCNC: 13 MG/DL (ref 6–23)
CALCIUM IONIZED: 4.32 MG/DL (ref 4.48–5.28)
CALCIUM SERPL-MCNC: 7.9 MG/DL (ref 8.3–10.6)
CHLORIDE BLD-SCNC: 94 MMOL/L (ref 99–110)
CO2: 27 MMOL/L (ref 21–32)
CREAT SERPL-MCNC: 1 MG/DL (ref 0.9–1.3)
CRP SERPL HS-MCNC: 212.7 MG/L
FIBRINOGEN LEVEL: 901 MG/DL (ref 170–540)
GFR SERPL CREATININE-BSD FRML MDRD: >60 ML/MIN/1.73M2
GLUCOSE BLD-MCNC: 138 MG/DL (ref 70–99)
GLUCOSE BLD-MCNC: 156 MG/DL (ref 70–99)
GLUCOSE BLD-MCNC: 165 MG/DL (ref 70–99)
GLUCOSE BLD-MCNC: 183 MG/DL (ref 70–99)
GLUCOSE SERPL-MCNC: 175 MG/DL (ref 70–99)
HCT VFR BLD CALC: 30.5 % (ref 42–52)
HEMOGLOBIN: 9.7 GM/DL (ref 13.5–18)
INR BLD: 1.1 INDEX
IONIZED CA: 1.08 MMOL/L (ref 1.12–1.32)
MAGNESIUM: 2.5 MG/DL (ref 1.8–2.4)
MCH RBC QN AUTO: 27.8 PG (ref 27–31)
MCHC RBC AUTO-ENTMCNC: 31.8 % (ref 32–36)
MCV RBC AUTO: 87.4 FL (ref 78–100)
PDW BLD-RTO: 12.1 % (ref 11.7–14.9)
PHOSPHORUS: 2.3 MG/DL (ref 2.5–4.9)
PLATELET # BLD: 208 K/CU MM (ref 140–440)
PMV BLD AUTO: 9.5 FL (ref 7.5–11.1)
POTASSIUM SERPL-SCNC: 4 MMOL/L (ref 3.5–5.1)
PROCALCITONIN SERPL-MCNC: 0.24 NG/ML
PROTHROMBIN TIME: 14.1 SECONDS (ref 11.7–14.5)
RBC # BLD: 3.49 M/CU MM (ref 4.6–6.2)
SODIUM BLD-SCNC: 132 MMOL/L (ref 135–145)
WBC # BLD: 8.7 K/CU MM (ref 4–10.5)

## 2024-02-02 PROCEDURE — 85730 THROMBOPLASTIN TIME PARTIAL: CPT

## 2024-02-02 PROCEDURE — 84100 ASSAY OF PHOSPHORUS: CPT

## 2024-02-02 PROCEDURE — 82962 GLUCOSE BLOOD TEST: CPT

## 2024-02-02 PROCEDURE — 6360000002 HC RX W HCPCS: Performed by: PHYSICIAN ASSISTANT

## 2024-02-02 PROCEDURE — 71046 X-RAY EXAM CHEST 2 VIEWS: CPT

## 2024-02-02 PROCEDURE — 85027 COMPLETE CBC AUTOMATED: CPT

## 2024-02-02 PROCEDURE — 94761 N-INVAS EAR/PLS OXIMETRY MLT: CPT

## 2024-02-02 PROCEDURE — 84145 PROCALCITONIN (PCT): CPT

## 2024-02-02 PROCEDURE — 82330 ASSAY OF CALCIUM: CPT

## 2024-02-02 PROCEDURE — 86140 C-REACTIVE PROTEIN: CPT

## 2024-02-02 PROCEDURE — 97530 THERAPEUTIC ACTIVITIES: CPT

## 2024-02-02 PROCEDURE — 85610 PROTHROMBIN TIME: CPT

## 2024-02-02 PROCEDURE — 6370000000 HC RX 637 (ALT 250 FOR IP): Performed by: PHYSICIAN ASSISTANT

## 2024-02-02 PROCEDURE — 94150 VITAL CAPACITY TEST: CPT

## 2024-02-02 PROCEDURE — 94640 AIRWAY INHALATION TREATMENT: CPT

## 2024-02-02 PROCEDURE — 6370000000 HC RX 637 (ALT 250 FOR IP): Performed by: STUDENT IN AN ORGANIZED HEALTH CARE EDUCATION/TRAINING PROGRAM

## 2024-02-02 PROCEDURE — 85384 FIBRINOGEN ACTIVITY: CPT

## 2024-02-02 PROCEDURE — 6370000000 HC RX 637 (ALT 250 FOR IP): Performed by: INTERNAL MEDICINE

## 2024-02-02 PROCEDURE — 2700000000 HC OXYGEN THERAPY PER DAY

## 2024-02-02 PROCEDURE — 2580000003 HC RX 258: Performed by: PHYSICIAN ASSISTANT

## 2024-02-02 PROCEDURE — 2500000003 HC RX 250 WO HCPCS: Performed by: PHYSICIAN ASSISTANT

## 2024-02-02 PROCEDURE — 94669 MECHANICAL CHEST WALL OSCILL: CPT

## 2024-02-02 PROCEDURE — 6370000000 HC RX 637 (ALT 250 FOR IP): Performed by: THORACIC SURGERY (CARDIOTHORACIC VASCULAR SURGERY)

## 2024-02-02 PROCEDURE — 80048 BASIC METABOLIC PNL TOTAL CA: CPT

## 2024-02-02 PROCEDURE — 97116 GAIT TRAINING THERAPY: CPT

## 2024-02-02 PROCEDURE — 83735 ASSAY OF MAGNESIUM: CPT

## 2024-02-02 PROCEDURE — 2060000000 HC ICU INTERMEDIATE R&B

## 2024-02-02 PROCEDURE — 71045 X-RAY EXAM CHEST 1 VIEW: CPT

## 2024-02-02 PROCEDURE — 99232 SBSQ HOSP IP/OBS MODERATE 35: CPT | Performed by: INTERNAL MEDICINE

## 2024-02-02 RX ORDER — CALCIUM GLUCONATE 20 MG/ML
1000 INJECTION, SOLUTION INTRAVENOUS ONCE
Status: COMPLETED | OUTPATIENT
Start: 2024-02-02 | End: 2024-02-02

## 2024-02-02 RX ORDER — INSULIN GLARGINE 100 [IU]/ML
14 INJECTION, SOLUTION SUBCUTANEOUS 2 TIMES DAILY
Status: DISCONTINUED | OUTPATIENT
Start: 2024-02-02 | End: 2024-02-06 | Stop reason: HOSPADM

## 2024-02-02 RX ORDER — TRAZODONE HYDROCHLORIDE 50 MG/1
50 TABLET ORAL ONCE
Status: COMPLETED | OUTPATIENT
Start: 2024-02-02 | End: 2024-02-02

## 2024-02-02 RX ORDER — ENALAPRIL MALEATE 5 MG/1
5 TABLET ORAL DAILY
Status: DISCONTINUED | OUTPATIENT
Start: 2024-02-02 | End: 2024-02-06 | Stop reason: HOSPADM

## 2024-02-02 RX ORDER — IPRATROPIUM BROMIDE AND ALBUTEROL SULFATE 2.5; .5 MG/3ML; MG/3ML
1 SOLUTION RESPIRATORY (INHALATION) EVERY 4 HOURS PRN
Status: DISCONTINUED | OUTPATIENT
Start: 2024-02-02 | End: 2024-02-06 | Stop reason: HOSPADM

## 2024-02-02 RX ADMIN — CHLORHEXIDINE GLUCONATE 0.12% ORAL RINSE 15 ML: 1.2 LIQUID ORAL at 09:54

## 2024-02-02 RX ADMIN — CHLORHEXIDINE GLUCONATE 0.12% ORAL RINSE 15 ML: 1.2 LIQUID ORAL at 21:53

## 2024-02-02 RX ADMIN — METOPROLOL TARTRATE 37.5 MG: 25 TABLET, FILM COATED ORAL at 21:53

## 2024-02-02 RX ADMIN — FUROSEMIDE 40 MG: 10 INJECTION, SOLUTION INTRAMUSCULAR; INTRAVENOUS at 09:55

## 2024-02-02 RX ADMIN — GABAPENTIN 300 MG: 300 CAPSULE ORAL at 21:53

## 2024-02-02 RX ADMIN — INSULIN GLARGINE 14 UNITS: 100 INJECTION, SOLUTION SUBCUTANEOUS at 10:48

## 2024-02-02 RX ADMIN — Medication 1 TABLET: at 09:52

## 2024-02-02 RX ADMIN — POLYETHYLENE GLYCOL (3350) 17 G: 17 POWDER, FOR SOLUTION ORAL at 09:52

## 2024-02-02 RX ADMIN — ENOXAPARIN SODIUM 30 MG: 100 INJECTION SUBCUTANEOUS at 21:53

## 2024-02-02 RX ADMIN — SERTRALINE HYDROCHLORIDE 100 MG: 50 TABLET ORAL at 09:54

## 2024-02-02 RX ADMIN — ENOXAPARIN SODIUM 30 MG: 100 INJECTION SUBCUTANEOUS at 09:54

## 2024-02-02 RX ADMIN — SENNOSIDES, DOCUSATE SODIUM 1 TABLET: 8.6; 5 TABLET ORAL at 09:53

## 2024-02-02 RX ADMIN — GABAPENTIN 300 MG: 300 CAPSULE ORAL at 15:35

## 2024-02-02 RX ADMIN — ACETAMINOPHEN 1000 MG: 500 TABLET ORAL at 21:53

## 2024-02-02 RX ADMIN — BACITRACIN: 500 OINTMENT TOPICAL at 21:56

## 2024-02-02 RX ADMIN — SODIUM PHOSPHATE, MONOBASIC, MONOHYDRATE AND SODIUM PHOSPHATE, DIBASIC, ANHYDROUS 15 MMOL: 142; 276 INJECTION, SOLUTION INTRAVENOUS at 12:36

## 2024-02-02 RX ADMIN — TRAZODONE HYDROCHLORIDE 50 MG: 50 TABLET ORAL at 22:04

## 2024-02-02 RX ADMIN — CALCIUM GLUCONATE 1000 MG: 20 INJECTION, SOLUTION INTRAVENOUS at 10:44

## 2024-02-02 RX ADMIN — ATORVASTATIN CALCIUM 40 MG: 40 TABLET, FILM COATED ORAL at 21:53

## 2024-02-02 RX ADMIN — CLOPIDOGREL BISULFATE 75 MG: 75 TABLET ORAL at 09:54

## 2024-02-02 RX ADMIN — METOPROLOL TARTRATE 25 MG: 25 TABLET, FILM COATED ORAL at 09:52

## 2024-02-02 RX ADMIN — SODIUM CHLORIDE, PRESERVATIVE FREE 10 ML: 5 INJECTION INTRAVENOUS at 10:40

## 2024-02-02 RX ADMIN — INSULIN GLARGINE 14 UNITS: 100 INJECTION, SOLUTION SUBCUTANEOUS at 21:55

## 2024-02-02 RX ADMIN — OXYCODONE HYDROCHLORIDE 5 MG: 5 TABLET ORAL at 02:51

## 2024-02-02 RX ADMIN — GABAPENTIN 300 MG: 300 CAPSULE ORAL at 09:53

## 2024-02-02 RX ADMIN — IPRATROPIUM BROMIDE AND ALBUTEROL SULFATE 1 DOSE: 2.5; .5 SOLUTION RESPIRATORY (INHALATION) at 02:59

## 2024-02-02 RX ADMIN — SODIUM CHLORIDE, PRESERVATIVE FREE 10 ML: 5 INJECTION INTRAVENOUS at 21:56

## 2024-02-02 RX ADMIN — OXYCODONE HYDROCHLORIDE 5 MG: 5 TABLET ORAL at 19:57

## 2024-02-02 RX ADMIN — MUPIROCIN: 20 OINTMENT TOPICAL at 21:56

## 2024-02-02 RX ADMIN — IPRATROPIUM BROMIDE AND ALBUTEROL SULFATE 1 DOSE: 2.5; .5 SOLUTION RESPIRATORY (INHALATION) at 11:06

## 2024-02-02 RX ADMIN — ACETAMINOPHEN 1000 MG: 500 TABLET ORAL at 15:35

## 2024-02-02 RX ADMIN — ASPIRIN 81 MG 81 MG: 81 TABLET ORAL at 09:54

## 2024-02-02 RX ADMIN — IPRATROPIUM BROMIDE AND ALBUTEROL SULFATE 1 DOSE: 2.5; .5 SOLUTION RESPIRATORY (INHALATION) at 15:09

## 2024-02-02 RX ADMIN — FAMOTIDINE 20 MG: 20 TABLET ORAL at 09:52

## 2024-02-02 RX ADMIN — POTASSIUM CHLORIDE 20 MEQ: 1500 TABLET, EXTENDED RELEASE ORAL at 09:52

## 2024-02-02 RX ADMIN — BACITRACIN: 500 OINTMENT TOPICAL at 09:53

## 2024-02-02 RX ADMIN — FAMOTIDINE 20 MG: 20 TABLET ORAL at 21:53

## 2024-02-02 RX ADMIN — METOPROLOL TARTRATE 12.5 MG: 25 TABLET, FILM COATED ORAL at 15:29

## 2024-02-02 RX ADMIN — INSULIN LISPRO 4 UNITS: 100 INJECTION, SOLUTION INTRAVENOUS; SUBCUTANEOUS at 11:55

## 2024-02-02 RX ADMIN — SENNOSIDES, DOCUSATE SODIUM 1 TABLET: 8.6; 5 TABLET ORAL at 21:53

## 2024-02-02 RX ADMIN — OXYCODONE HYDROCHLORIDE 5 MG: 5 TABLET ORAL at 11:55

## 2024-02-02 RX ADMIN — INSULIN LISPRO 4 UNITS: 100 INJECTION, SOLUTION INTRAVENOUS; SUBCUTANEOUS at 21:56

## 2024-02-02 RX ADMIN — MUPIROCIN: 20 OINTMENT TOPICAL at 09:53

## 2024-02-02 RX ADMIN — ENALAPRIL MALEATE 5 MG: 5 TABLET ORAL at 17:29

## 2024-02-02 RX ADMIN — IPRATROPIUM BROMIDE AND ALBUTEROL SULFATE 1 DOSE: 2.5; .5 SOLUTION RESPIRATORY (INHALATION) at 19:08

## 2024-02-02 ASSESSMENT — PAIN DESCRIPTION - LOCATION
LOCATION: CHEST;INCISION
LOCATION: CHEST
LOCATION: CHEST

## 2024-02-02 ASSESSMENT — PAIN DESCRIPTION - PAIN TYPE: TYPE: SURGICAL PAIN

## 2024-02-02 ASSESSMENT — PAIN SCALES - GENERAL
PAINLEVEL_OUTOF10: 9
PAINLEVEL_OUTOF10: 7
PAINLEVEL_OUTOF10: 0
PAINLEVEL_OUTOF10: 10
PAINLEVEL_OUTOF10: 0
PAINLEVEL_OUTOF10: 0

## 2024-02-02 ASSESSMENT — PAIN DESCRIPTION - ORIENTATION
ORIENTATION: MID
ORIENTATION: MID;LOWER
ORIENTATION: MID

## 2024-02-02 ASSESSMENT — PAIN DESCRIPTION - DESCRIPTORS
DESCRIPTORS: SHARP
DESCRIPTORS: ACHING;DISCOMFORT
DESCRIPTORS: ACHING;DISCOMFORT

## 2024-02-02 ASSESSMENT — PAIN DESCRIPTION - ONSET: ONSET: ON-GOING

## 2024-02-02 ASSESSMENT — PAIN DESCRIPTION - FREQUENCY: FREQUENCY: CONTINUOUS

## 2024-02-02 NOTE — PROGRESS NOTES
V2.0  [facility] [place] [place] Critical Care Progress Note      Name:  Jose Figueroa /Age/Sex: 1965  (58 y.o. male)   MRN & CSN:  [id number] & [id number] Encounter Date/Time: 2024 8:40 AM EST    Location:  -A PCP: Arabella Boles MD       Hospital Day: 5    Assessment and Plan:   Jose Figueroa is a 58 y.o. male with pmh of  Hypertension, hyperlipidemia, diabetes mellitus and previous stroke  now s/p CABG x 2     HTN  CAD  S/p CABG  DM on metformin    Plan:  - extubated per protocol. On 2L NC  - Pain better controlled today per patient  - off pressors and milrinone  - fevers post op - was on ancef, now stopped. No leukocytosis.  Monitor off abx. Can check LE dopplers    Hx of DM  - on metformin at home. A1C 6.6.   - on high ISS and lantus 10q12 , FS still elevated. Will increase to Lantus 14q12.     No further critical care needs. Please reconsult as needed. Cotninue care per CTS.    I have performed 50 minutes of critical care time, excluding and separately billable procedures      Diet ADULT DIET; Regular; 4 carb choices (60 gm/meal); Low Fat/Low Chol/High Fiber/2 gm Na   DVT Prophylaxis [] Lovenox, []  Heparin, [x] SCDs, [] Ambulation,  [] Eliquis, [] Xarelto  [] Coumadin   GI Prophylaxis [x] Yes          [] No   Code Status Full Code    Disposition Patient doesn't requires continued ICU care     Subjective:     Awake and alert. OOBC.   FS remains high despite high ISS and lantus coverage.        Review of Systems:    Review of Systems   Constitutional: Negative.    HENT: Negative.     Respiratory: Negative.     Cardiovascular: Negative.    Gastrointestinal: Negative.    Genitourinary: Negative.    Musculoskeletal: Negative.            Objective:     Intake/Output Summary (Last 24 hours) at 2024 1409  Last data filed at 2024 1040  Gross per 24 hour   Intake 510 ml   Output 3625 ml   Net -3115 ml          Vitals:   Vitals:    24 0955   BP: (!) 142/87   Pulse:    Resp:    Temp:    SpO2:

## 2024-02-02 NOTE — CARE COORDINATION
Spoke to Zeina VELASQUEZ. Patient should be ready for ARU soon. Will check on bed status. Kimberly Cabrera RN

## 2024-02-02 NOTE — PROGRESS NOTES
[facility] Cardiothoracic Surgery  Daily Progress Note    Surgeon:  Dr. Kaden Trejo       Subjective:  Mr. Figueroa is a 58 y.o. year-old male status post CORONARY ARTERY BYPASS GRAFT X2, LIMA - LAD, SVG - PDA; LEFT ENDOSCOPIC GREATER SAPHENOUS VEIN HARVEST; INTRAOPERATIVE TRANSESOPHAGEAL ECHOCARDIOGRAM  on 1/29/24.      Patient was seen and examined at bedside this morning without any complaints.afebrile.       Hospital Course:  1/30/24 extubated to NC overnight. Wean dheeraj then epi for goal MAP>65. 500cc albumin 5%. Keep chest tubes and central line.    1/31/24 Increase metoprolol to 25mg PO BID. Lasix 40mg + K 20meq IV BID today. Extra 40 IV lasix this morning for total of 80 mg. Discontinue milrinone   2/1/24 Lasix 40mg IV daily. ID consult for antibiotic recommendations. Panculture. remove chest tubes   2/2/24     Vital Signs: BP (!) 142/87   Pulse 99   Temp 97.7 °F (36.5 °C) (Oral)   Resp 21   Ht 1.753 m (5' 9\")   Wt 124.5 kg (274 lb 6.4 oz)   SpO2 97%   BMI 40.52 kg/m²  O2 Flow Rate (L/min): 2 L/min   Admit Weight: Weight - Scale: 120.3 kg (265 lb 2 oz)   WEIGHTWeight - Scale: 124.5 kg (274 lb 6.4 oz)     I/O's:  I/O last 3 completed shifts:  In: 1625 [P.O.:1625]  Out: 8065 [Urine:7855; Chest Tube:210]    Data:    CBC:   Recent Labs     01/31/24  0600 02/01/24  0455 02/02/24  0504   WBC 12.2* 9.2 8.7   HGB 9.5* 9.6* 9.7*   HCT 29.7* 29.8* 30.5*   MCV 88.1 87.1 87.4    158 208       BMP:   Recent Labs     01/31/24  0600 02/01/24  0455 02/02/24  0504    134* 132*   K 3.9 4.2 4.0    99 94*   CO2 25 28 27   PHOS 3.1 2.8 2.3*   BUN 12 13 13   CREATININE 1.2 1.1 1.0       PT/INR:   Recent Labs     01/31/24  0600 02/01/24  0455 02/02/24  0504   PROTIME 15.8* 15.7* 14.1   INR 1.2 1.2 1.1       APTT:   Recent Labs     01/31/24  0600 02/01/24  0455 02/02/24  0504   APTT 32.6 33.5 34.3         Chest X-Ray  Veil like opacity of the left hemithorax, likely

## 2024-02-02 NOTE — PROGRESS NOTES
I met with patient in room 2004.  This is POD # 4. I re-introduced myself to patient as the cardiac rehab nurse, as well as re-introducing him to the [facility].  I explained to him that this program is a customized out patient program of exercise and education. I explained to the patient that Cardiac Rehab is designed to help improve your hearts future.  Cardiac rehab is a medically supervised program designed to improve your cardiovascular health through educating about nutrition, exercise, and stress release.  Patient politely declined watching [facility] program overview video.      I explained to patient that he would not be starting program for six weeks and that the Cardiac Rehab facility would be in contact with him to setup an appointment when it is closer to his release date from restrictions.  Patient had no further questions regarding rehab at this time.

## 2024-02-02 NOTE — PROGRESS NOTES
CARDIOLOGY  NOTE        Name:  Jose Figueroa /Age/Sex: 1965  (58 y.o. male)   MRN & CSN:  [id number] & [id number] Admission Date/Time: 2024  9:54 AM   Location:  -A PCP: Arabella Boles MD       Hospital Day: 5    Supportive care    PLAN FROM CARDIOLOGY FOR TODAY:   Supportive care, rehab    - cardiology consult is for: Post CABG    -  Interval history: Ambulating no complaints    ASSESSMENT/ PLAN:  On appropriate regimen with Lipitor 40 mg p.o. daily, Plavix, aspirin, beta-blocker i.e. metoprolol 25 mg p.o. twice daily increase the beta-blocker dose heart rate is high blood pressure is slightly on the higher side and monitor  -Post CABG  Procedure(s):  CORONARY ARTERY BYPASS GRAFT X2, LIMA - LAD, SVG - PDA; LEFT ENDOSCOPIC GREATER SAPHENOUS VEIN HARVEST; INTRAOPERATIVE TRANSESOPHAGEAL ECHOCARDIOGRAM    -Risk factor modification  -Hyperlipidemia Lipitor 40 mg p.o. daily  -Morbid obesity  Cardiac rehab  We will follow-up as needed  BP is elevated, add vasotec          Subjective:      Todays complain: Patient no complaints    HPI:  Jose is a 58 y.o.year old who and presents with had no chief complaint listed for this encounter.  No chief complaint on file.          Objective: Temperature:  Current - Temp: 97.7 °F (36.5 °C); Max - Temp  Av.6 °F (36.4 °C)  Min: 97.5 °F (36.4 °C)  Max: 97.7 °F (36.5 °C)    Respiratory Rate : Resp  Av.8  Min: 18  Max: 33    Pulse Range: Pulse  Av.6  Min: 82  Max: 102    Blood Presuure Range:  Systolic (24hrs), Av , Min:104 , Max:148   ; Diastolic (24hrs), Av, Min:75, Max:133      Pulse ox Range: SpO2  Av.4 %  Min: 88 %  Max: 97 %    24hr I & O:    Intake/Output Summary (Last 24 hours) at 2024 1020  Last data filed at 2024 0245  Gross per 24 hour   Intake 925 ml   Output 4950 ml   Net -4025 ml         BP (!) 142/87   Pulse 99   Temp 97.7 °F (36.5 °C) (Oral)   Resp 21  mg-albuterol 2.5 mg, sodium phosphate 15 mmol in sodium chloride 0.9 % 250 mL IVPB, oxyCODONE, hydrALAZINE, potassium chloride **OR** potassium alternative oral replacement **OR** potassium chloride, magnesium sulfate, sodium chloride flush, sodium chloride, ondansetron **OR** ondansetron, bisacodyl, potassium chloride, calcium chloride 1,000 mg in sodium chloride 0.9 % 100 mL IVPB **OR** calcium chloride 2,000 mg in sodium chloride 0.9 % 100 mL IVPB, albumin human 5%, phenylephrine, glucose, dextrose bolus **OR** dextrose bolus, glucagon (rDNA), dextrose    Lab Data:  CBC:   Recent Labs     01/31/24  0600 02/01/24  0455 02/02/24  0504   WBC 12.2* 9.2 8.7   HGB 9.5* 9.6* 9.7*   HCT 29.7* 29.8* 30.5*   MCV 88.1 87.1 87.4    158 208     BMP:   Recent Labs     01/31/24  0600 02/01/24  0455 02/02/24  0504    134* 132*   K 3.9 4.2 4.0    99 94*   CO2 25 28 27   PHOS 3.1 2.8 2.3*   BUN 12 13 13   CREATININE 1.2 1.1 1.0     LIVER PROFILE: No results for input(s): \"AST\", \"ALT\", \"LIPASE\", \"AMYLASE\", \"ALB\", \"BILIDIR\", \"BILITOT\", \"ALKPHOS\" in the last 72 hours.  PT/INR:   Recent Labs     01/31/24  0600 02/01/24  0455 02/02/24  0504   PROTIME 15.8* 15.7* 14.1   INR 1.2 1.2 1.1     APTT:   Recent Labs     01/31/24  0600 02/01/24  0455 02/02/24  0504   APTT 32.6 33.5 34.3     BNP:  No results for input(s): \"BNP\" in the last 72 hours.  TROPONIN: No results for input(s): \"TROPONINI\" in the last 72 hours. No results for input(s): \"TROPONINT\" in the last 72 hours.     Labs, consult, tests reviewed                    Bradley Jim MD, PA-C 2/2/2024 10:20 AM     Please note this report has been partially produced using speech recognition software and may contain errors related to that system including errors in grammar, punctuation, and spelling, as well as words and phrases that may be inappropriate. If there are any questions or concerns please feel free to contact the dictating provider for clarification.
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
## 2024-02-02 NOTE — PROGRESS NOTES
Physical Therapy Treatment Note  Name: Jose Figueroa MRN: [id number] :   1965   Date:  2024   Admission Date: 2024 Room:     Restrictions/Precautions: General, fall precautions, sternal precautions, wound vac  Communication with other providers:  Pt okay to see for therapy per RN   Subjective:  Patient states:  \"I'd like to get back to bed\"   Pain:   Location, Type, Intensity (0/10 to 10/10):  Endorses significant pain at incision site, does not rate.   Objective:    Observation:  Pt sitting up in recliner upon PTA arrival.   Objective Measures:  Tele, stable.   Treatment, including education/measures:    Therapeutic Activity Training:   Therapeutic activity training was instructed today.  Cues were given for safety, sequence, UE/LE placement, awareness, and balance. Activities performed today included bed mobility training, sup-sit, sit-stand, SPT.    Mobility:  STS: x 2 attempts from recliner with Max A from PTA, pt presents with difficulty obtaining enough momentum to gain balance. Mod A x 2 from recliner with assist from nursing. Max cues provided for maintenance of sternal precautions.   Sit > sup: Mod A at LEs with cues for sidelying for improved transition.   Boost to HOB: DEP x 2     Once returned to sitting EOB pt requires time for use of urinal.     Gait:  Pt ambulated 100ft x 2 with CW, CGA, and chair follow for safety. Pt demos decreased step length, decreased gait speed, mild LE instability with very mild L knee buckling with stance, decreased endurance with breathlessness. PTA provided cues for upright gaze and sustained knee ext with stance with fair correction.     Safety:   Pt returned safely to bed with bed alarm activated, call light in reach, all needs met.   Assessment / Impression:    Pt tolerated OOB activities well but does present with mod pain at incision.   Patient's tolerance of treatment:  Good   Adverse Reaction: none  Significant change in status and

## 2024-02-03 ENCOUNTER — APPOINTMENT (OUTPATIENT)
Dept: GENERAL RADIOLOGY | Age: 59
DRG: 166 | End: 2024-02-03
Attending: THORACIC SURGERY (CARDIOTHORACIC VASCULAR SURGERY)
Payer: COMMERCIAL

## 2024-02-03 LAB
ANION GAP SERPL CALCULATED.3IONS-SCNC: 9 MMOL/L (ref 7–16)
APTT: 35.9 SECONDS (ref 25.1–37.1)
BASOPHILS ABSOLUTE: 0 K/CU MM
BASOPHILS RELATIVE PERCENT: 0.3 % (ref 0–1)
BUN SERPL-MCNC: 13 MG/DL (ref 6–23)
CALCIUM IONIZED: 4.56 MG/DL (ref 4.48–5.28)
CALCIUM SERPL-MCNC: 8.2 MG/DL (ref 8.3–10.6)
CHLORIDE BLD-SCNC: 101 MMOL/L (ref 99–110)
CO2: 28 MMOL/L (ref 21–32)
CREAT SERPL-MCNC: 0.9 MG/DL (ref 0.9–1.3)
CRP SERPL HS-MCNC: 136.1 MG/L
DIFFERENTIAL TYPE: ABNORMAL
EOSINOPHILS ABSOLUTE: 0.2 K/CU MM
EOSINOPHILS RELATIVE PERCENT: 2.1 % (ref 0–3)
FIBRINOGEN LEVEL: 794 MG/DL (ref 170–540)
GFR SERPL CREATININE-BSD FRML MDRD: >60 ML/MIN/1.73M2
GLUCOSE BLD-MCNC: 121 MG/DL (ref 70–99)
GLUCOSE BLD-MCNC: 128 MG/DL (ref 70–99)
GLUCOSE BLD-MCNC: 133 MG/DL (ref 70–99)
GLUCOSE BLD-MCNC: 141 MG/DL (ref 70–99)
GLUCOSE SERPL-MCNC: 126 MG/DL (ref 70–99)
HCT VFR BLD CALC: 30.6 % (ref 42–52)
HEMOGLOBIN: 9.9 GM/DL (ref 13.5–18)
IMMATURE NEUTROPHIL %: 0.6 % (ref 0–0.43)
INR BLD: 1.1 INDEX
IONIZED CA: 1.14 MMOL/L (ref 1.12–1.32)
LYMPHOCYTES ABSOLUTE: 0.9 K/CU MM
LYMPHOCYTES RELATIVE PERCENT: 13.4 % (ref 24–44)
MAGNESIUM: 2.5 MG/DL (ref 1.8–2.4)
MCH RBC QN AUTO: 27.8 PG (ref 27–31)
MCHC RBC AUTO-ENTMCNC: 32.4 % (ref 32–36)
MCV RBC AUTO: 86 FL (ref 78–100)
MONOCYTES ABSOLUTE: 0.7 K/CU MM
MONOCYTES RELATIVE PERCENT: 9.3 % (ref 0–4)
NUCLEATED RBC %: 0 %
PDW BLD-RTO: 12 % (ref 11.7–14.9)
PHOSPHORUS: 3 MG/DL (ref 2.5–4.9)
PLATELET # BLD: 235 K/CU MM (ref 140–440)
PMV BLD AUTO: 8.9 FL (ref 7.5–11.1)
POTASSIUM SERPL-SCNC: 4.1 MMOL/L (ref 3.5–5.1)
PROTHROMBIN TIME: 14.2 SECONDS (ref 11.7–14.5)
RBC # BLD: 3.56 M/CU MM (ref 4.6–6.2)
SEGMENTED NEUTROPHILS ABSOLUTE COUNT: 5.2 K/CU MM
SEGMENTED NEUTROPHILS RELATIVE PERCENT: 74.3 % (ref 36–66)
SODIUM BLD-SCNC: 138 MMOL/L (ref 135–145)
TOTAL IMMATURE NEUTOROPHIL: 0.04 K/CU MM
TOTAL NUCLEATED RBC: 0 K/CU MM
WBC # BLD: 7 K/CU MM (ref 4–10.5)

## 2024-02-03 PROCEDURE — 94761 N-INVAS EAR/PLS OXIMETRY MLT: CPT

## 2024-02-03 PROCEDURE — 99232 SBSQ HOSP IP/OBS MODERATE 35: CPT | Performed by: INTERNAL MEDICINE

## 2024-02-03 PROCEDURE — 6370000000 HC RX 637 (ALT 250 FOR IP): Performed by: PHYSICIAN ASSISTANT

## 2024-02-03 PROCEDURE — 2060000000 HC ICU INTERMEDIATE R&B

## 2024-02-03 PROCEDURE — 85025 COMPLETE CBC W/AUTO DIFF WBC: CPT

## 2024-02-03 PROCEDURE — 6370000000 HC RX 637 (ALT 250 FOR IP): Performed by: THORACIC SURGERY (CARDIOTHORACIC VASCULAR SURGERY)

## 2024-02-03 PROCEDURE — 94640 AIRWAY INHALATION TREATMENT: CPT

## 2024-02-03 PROCEDURE — 85610 PROTHROMBIN TIME: CPT

## 2024-02-03 PROCEDURE — 84100 ASSAY OF PHOSPHORUS: CPT

## 2024-02-03 PROCEDURE — 6370000000 HC RX 637 (ALT 250 FOR IP): Performed by: INTERNAL MEDICINE

## 2024-02-03 PROCEDURE — 2580000003 HC RX 258: Performed by: PHYSICIAN ASSISTANT

## 2024-02-03 PROCEDURE — 82330 ASSAY OF CALCIUM: CPT

## 2024-02-03 PROCEDURE — 71046 X-RAY EXAM CHEST 2 VIEWS: CPT

## 2024-02-03 PROCEDURE — 97110 THERAPEUTIC EXERCISES: CPT

## 2024-02-03 PROCEDURE — 6370000000 HC RX 637 (ALT 250 FOR IP): Performed by: STUDENT IN AN ORGANIZED HEALTH CARE EDUCATION/TRAINING PROGRAM

## 2024-02-03 PROCEDURE — 99024 POSTOP FOLLOW-UP VISIT: CPT | Performed by: THORACIC SURGERY (CARDIOTHORACIC VASCULAR SURGERY)

## 2024-02-03 PROCEDURE — 94669 MECHANICAL CHEST WALL OSCILL: CPT

## 2024-02-03 PROCEDURE — 80048 BASIC METABOLIC PNL TOTAL CA: CPT

## 2024-02-03 PROCEDURE — 83735 ASSAY OF MAGNESIUM: CPT

## 2024-02-03 PROCEDURE — 85384 FIBRINOGEN ACTIVITY: CPT

## 2024-02-03 PROCEDURE — 85730 THROMBOPLASTIN TIME PARTIAL: CPT

## 2024-02-03 PROCEDURE — 82962 GLUCOSE BLOOD TEST: CPT

## 2024-02-03 PROCEDURE — 86140 C-REACTIVE PROTEIN: CPT

## 2024-02-03 PROCEDURE — 97116 GAIT TRAINING THERAPY: CPT

## 2024-02-03 PROCEDURE — 6360000002 HC RX W HCPCS: Performed by: PHYSICIAN ASSISTANT

## 2024-02-03 RX ORDER — TRAZODONE HYDROCHLORIDE 50 MG/1
50 TABLET ORAL ONCE
Status: COMPLETED | OUTPATIENT
Start: 2024-02-03 | End: 2024-02-03

## 2024-02-03 RX ADMIN — FUROSEMIDE 40 MG: 10 INJECTION, SOLUTION INTRAMUSCULAR; INTRAVENOUS at 09:26

## 2024-02-03 RX ADMIN — BACITRACIN: 500 OINTMENT TOPICAL at 21:29

## 2024-02-03 RX ADMIN — ENALAPRIL MALEATE 5 MG: 5 TABLET ORAL at 09:25

## 2024-02-03 RX ADMIN — SENNOSIDES, DOCUSATE SODIUM 1 TABLET: 8.6; 5 TABLET ORAL at 09:26

## 2024-02-03 RX ADMIN — MUPIROCIN: 20 OINTMENT TOPICAL at 09:27

## 2024-02-03 RX ADMIN — CLOPIDOGREL BISULFATE 75 MG: 75 TABLET ORAL at 09:25

## 2024-02-03 RX ADMIN — ASPIRIN 81 MG 81 MG: 81 TABLET ORAL at 09:25

## 2024-02-03 RX ADMIN — FAMOTIDINE 20 MG: 20 TABLET ORAL at 21:28

## 2024-02-03 RX ADMIN — POTASSIUM CHLORIDE 20 MEQ: 1500 TABLET, EXTENDED RELEASE ORAL at 09:25

## 2024-02-03 RX ADMIN — OXYCODONE HYDROCHLORIDE 5 MG: 5 TABLET ORAL at 19:20

## 2024-02-03 RX ADMIN — IPRATROPIUM BROMIDE AND ALBUTEROL SULFATE 1 DOSE: 2.5; .5 SOLUTION RESPIRATORY (INHALATION) at 08:27

## 2024-02-03 RX ADMIN — FAMOTIDINE 20 MG: 20 TABLET ORAL at 09:26

## 2024-02-03 RX ADMIN — METOPROLOL TARTRATE 37.5 MG: 25 TABLET, FILM COATED ORAL at 09:25

## 2024-02-03 RX ADMIN — CHLORHEXIDINE GLUCONATE 0.12% ORAL RINSE 15 ML: 1.2 LIQUID ORAL at 21:28

## 2024-02-03 RX ADMIN — ENOXAPARIN SODIUM 30 MG: 100 INJECTION SUBCUTANEOUS at 21:28

## 2024-02-03 RX ADMIN — INSULIN GLARGINE 14 UNITS: 100 INJECTION, SOLUTION SUBCUTANEOUS at 21:28

## 2024-02-03 RX ADMIN — ATORVASTATIN CALCIUM 40 MG: 40 TABLET, FILM COATED ORAL at 21:28

## 2024-02-03 RX ADMIN — GABAPENTIN 300 MG: 300 CAPSULE ORAL at 09:26

## 2024-02-03 RX ADMIN — BACITRACIN: 500 OINTMENT TOPICAL at 09:26

## 2024-02-03 RX ADMIN — IPRATROPIUM BROMIDE AND ALBUTEROL SULFATE 1 DOSE: 2.5; .5 SOLUTION RESPIRATORY (INHALATION) at 15:40

## 2024-02-03 RX ADMIN — SENNOSIDES, DOCUSATE SODIUM 1 TABLET: 8.6; 5 TABLET ORAL at 21:28

## 2024-02-03 RX ADMIN — ACETAMINOPHEN 1000 MG: 500 TABLET ORAL at 06:02

## 2024-02-03 RX ADMIN — Medication 1 TABLET: at 09:25

## 2024-02-03 RX ADMIN — METOPROLOL TARTRATE 37.5 MG: 25 TABLET, FILM COATED ORAL at 21:35

## 2024-02-03 RX ADMIN — ACETAMINOPHEN 1000 MG: 500 TABLET ORAL at 12:41

## 2024-02-03 RX ADMIN — POLYETHYLENE GLYCOL (3350) 17 G: 17 POWDER, FOR SOLUTION ORAL at 09:26

## 2024-02-03 RX ADMIN — SODIUM CHLORIDE, PRESERVATIVE FREE 10 ML: 5 INJECTION INTRAVENOUS at 09:27

## 2024-02-03 RX ADMIN — GABAPENTIN 300 MG: 300 CAPSULE ORAL at 21:28

## 2024-02-03 RX ADMIN — CHLORHEXIDINE GLUCONATE 0.12% ORAL RINSE 15 ML: 1.2 LIQUID ORAL at 09:25

## 2024-02-03 RX ADMIN — ENOXAPARIN SODIUM 30 MG: 100 INJECTION SUBCUTANEOUS at 09:26

## 2024-02-03 RX ADMIN — TRAZODONE HYDROCHLORIDE 50 MG: 50 TABLET ORAL at 23:04

## 2024-02-03 RX ADMIN — ACETAMINOPHEN 1000 MG: 500 TABLET ORAL at 21:28

## 2024-02-03 RX ADMIN — GABAPENTIN 300 MG: 300 CAPSULE ORAL at 12:43

## 2024-02-03 RX ADMIN — SODIUM CHLORIDE, PRESERVATIVE FREE 10 ML: 5 INJECTION INTRAVENOUS at 21:30

## 2024-02-03 RX ADMIN — SERTRALINE HYDROCHLORIDE 100 MG: 50 TABLET ORAL at 09:26

## 2024-02-03 RX ADMIN — INSULIN GLARGINE 14 UNITS: 100 INJECTION, SOLUTION SUBCUTANEOUS at 09:26

## 2024-02-03 ASSESSMENT — PAIN DESCRIPTION - ONSET
ONSET: ON-GOING
ONSET: ON-GOING

## 2024-02-03 ASSESSMENT — PAIN DESCRIPTION - PAIN TYPE
TYPE: SURGICAL PAIN

## 2024-02-03 ASSESSMENT — PAIN DESCRIPTION - DESCRIPTORS
DESCRIPTORS: ACHING;DISCOMFORT

## 2024-02-03 ASSESSMENT — PAIN DESCRIPTION - ORIENTATION
ORIENTATION: MID;LOWER

## 2024-02-03 ASSESSMENT — PAIN DESCRIPTION - LOCATION
LOCATION: CHEST

## 2024-02-03 ASSESSMENT — PAIN DESCRIPTION - FREQUENCY
FREQUENCY: CONTINUOUS

## 2024-02-03 ASSESSMENT — PAIN SCALES - GENERAL
PAINLEVEL_OUTOF10: 2
PAINLEVEL_OUTOF10: 9
PAINLEVEL_OUTOF10: 2

## 2024-02-03 ASSESSMENT — PAIN - FUNCTIONAL ASSESSMENT
PAIN_FUNCTIONAL_ASSESSMENT: ACTIVITIES ARE NOT PREVENTED

## 2024-02-03 NOTE — PROGRESS NOTES
Physical Therapy    Physical Therapy Treatment Note  Name: Jose Figueroa MRN: [id number] :   1965   Date:  2/3/2024   Admission Date: 2024 Room:  A   Restrictions/Precautions:        Sternal, no pushing, pulling or lifting more than 5 pounds for the first 2 weeks and then 10 pounds up to 3 months,   Arms are to support chest when changing position, coughing or sneezing. No lifting arms above 90 degrees except with the ex's    Recommended discharge to ARU from [facility] on     Communication with other providers:  Dalila JUNE states pt is ok to see for therapy  Subjective:  Patient states:  he did not sleep well but he just woke up and feels a lot better  Pain:   Location, Type, Intensity (0/10 to 10/10):  0/10  Objective:    Observation:  pt was sitting up in the chair and having trouble with word pronunciation today  Treatment, including education/measures:  Vital Signs  HR: 92, B/P 126/84, O2 94% on room air  Education:   Sternal Precautions, Purpose of Exercise Program, patience with healing  Pt was instructed in Intermediate Cardiac ex program:sitting  Overhead Side Stretch x 10  Ankle Pumps/ Heel Raises x 10  Marching Seated x 10  Forward Arm Raises x 10  Side Arm Raises x 10  Arm Crosses x 10  Arm Circles Forwards and Backwards x 10  SittingTrunkTwist x 10  Transfers with line management of tele  Scooting :SBA and VC's for sternal precautions  Sit to stand :mod a of 1 and VC's for sternal precautions  Stand to sit :min A of 1 and VC's for sternal precautions  Gait:  Pt amb with no AD for 75 ft with min A of 1 for unsteady gt and turns  Pt needed VC's for PLB, pathway and balance  Pt also instructed in progression of gt and expected decrease in distance walked .  Safety  Patient left safely in the chair, with call light/phone in reach. Gait belt was used for transfers and gait.  Assessment / Impression:    Patient's tolerance of treatment:  good, having difficulty with increase in deficits he  had prior to surgery with gt and speech   Adverse Reaction: none  Significant change in status and impact:  progressing well  Barriers to improvement:  weakness, unsteady gt  Plan for Next Session:    Will cont to progress ex's and gt per cardiac protocol and educate pt on sternal precautions, S & S of ex intolerance, purpose of ex's, progression of ex's and gt at home.   Time in:  1015  Time out:  1045  Timed treatment minutes:  30  Total treatment time:  30  Previously filed items:     Short Term Goals  Time Frame for Short Term Goals: 1 week  Short Term Goal 1: Pt will complete bed mobility w mod A  Short Term Goal 2: Pt will complete STS w LRAD mod A  Short Term Goal 3: Pt will ambulate 100' w LRAD SBA     Electronically signed by:    Valeria Feliciano PTA  2/3/2024, 11:56 AM

## 2024-02-03 NOTE — PROGRESS NOTES
[facility] Cardiothoracic Surgery  Daily Progress Note    Surgeon:  Dr. Kaden Trejo       Subjective:  Mr. Figueroa is a 58 y.o. year-old male status post CORONARY ARTERY BYPASS GRAFT X2, LIMA - LAD, SVG - PDA; LEFT ENDOSCOPIC GREATER SAPHENOUS VEIN HARVEST; INTRAOPERATIVE TRANSESOPHAGEAL ECHOCARDIOGRAM  on 1/29/24.      Patient was seen and examined at bedside this morning without any complaints.afebrile.       Hospital Course:  1/30/24 extubated to NC overnight. Wean dheeraj then epi for goal MAP>65. 500cc albumin 5%. Keep chest tubes and central line.    1/31/24 Increase metoprolol to 25mg PO BID. Lasix 40mg + K 20meq IV BID today. Extra 40 IV lasix this morning for total of 80 mg. Discontinue milrinone   2/1/24 Lasix 40mg IV daily. ID consult for antibiotic recommendations. Panculture. remove chest tubes   2/2/24     Vital Signs: /84   Pulse 94   Temp 97.6 °F (36.4 °C)   Resp 19   Ht 1.753 m (5' 9\")   Wt 121.9 kg (268 lb 12.8 oz)   SpO2 94%   BMI 39.69 kg/m²  O2 Flow Rate (L/min): 0 L/min   Admit Weight: Weight - Scale: 120.3 kg (265 lb 2 oz)   WEIGHTWeight - Scale: 121.9 kg (268 lb 12.8 oz)     I/O's:  I/O last 3 completed shifts:  In: 260 [P.O.:250; I.V.:10]  Out: 7750 [Urine:7750]    Data:    CBC:   Recent Labs     02/01/24  0455 02/02/24  0504 02/03/24  0445   WBC 9.2 8.7 7.0   HGB 9.6* 9.7* 9.9*   HCT 29.8* 30.5* 30.6*   MCV 87.1 87.4 86.0    208 235       BMP:   Recent Labs     02/01/24  0455 02/02/24  0504 02/03/24  0445   * 132* 138   K 4.2 4.0 4.1   CL 99 94* 101   CO2 28 27 28   PHOS 2.8 2.3* 3.0   BUN 13 13 13   CREATININE 1.1 1.0 0.9       PT/INR:   Recent Labs     02/01/24  0455 02/02/24  0504 02/03/24  0445   PROTIME 15.7* 14.1 14.2   INR 1.2 1.1 1.1       APTT:   Recent Labs     02/01/24  0455 02/02/24  0504 02/03/24  0445   APTT 33.5 34.3 35.9         Chest X-Ray  Veil like opacity of the left hemithorax, likely layering pleural  precertification - he is ready for discharge to ARU once this is approved.      Level of Care:  Stepdown        Phillip Hou MD 02/03/24 9:16 AM        New Consults 8:00AM-4:30PM: Call Office, 4:30PM to 8:00AM Surgeon on-call    CVICU or other units patient follow up: Rashad author of this note 8:00AM-4:30PM    CVICU patient or urgent follow up: 4:30PM to 8:00AM Call or Page Surgeon on-call     Step-down patient follow up: 4:30PM to 8:00AM Page or secure chat PA on-call

## 2024-02-03 NOTE — PROGRESS NOTES
CARDIOLOGY PROGRESS NOTE                                                  Name:  Jose Figueroa /Age/Sex: 1965  (58 y.o. male)   MRN & CSN:  [id number] & [id number] Admission Date/Time: 2024  9:54 AM   Location:  -A PCP: Arabella Boles MD         Admit Date:  2024  Hospital Day: 6      SUBJECTIVE:     Seen patient as follow up as consultation for CABG    No chest pain.  No shortness of breath  No palpations    TELEMETRY: SR         Intake/Output Summary (Last 24 hours) at 2/3/2024 1735  Last data filed at 2/3/2024 1500  Gross per 24 hour   Intake 250 ml   Output 5500 ml   Net -5250 ml       Assessment/Plan:          Coronary disease s/p CABG x 2 LIMA LAD, SVG PDA  Essential hypertension  Hyperlipidemia  Hypokalemia    Continue with aspirin 81 mg, Plavix 25 mg p.o. daily  Continue with blood pressure control with Vasotec 5 mg daily, metoprolol tartrate 37.5 mg p.o. twice daily  Continue with Lasix 40 mg p.o. twice daily  High intensity statins: Lipitor 40 daily  Cardiac rehab as outpatient      Past medical history:    has a past medical history of CVA (cerebral vascular accident) (HCC), Diabetes mellitus (HCC), Hyperlipidemia, and Hypertension.  Past surgical history:   has a past surgical history that includes brain surgery; Cardiac procedure (N/A, 2024); invasive vascular (N/A, 2024); and Coronary artery bypass graft (N/A, 2024).  Social History:   reports that he quit smoking about 21 years ago. His smoking use included cigarettes and cigars. He started smoking about 56 years ago. He has a 45.0 pack-year smoking history. He has never used smokeless tobacco. He reports that he does not currently use alcohol. He reports that he does not use drugs.  Family history:  family history includes Diabetes in his maternal grandmother; Hypertension in his maternal uncle; Stroke in his maternal grandfather.    OBJECTIVE:     /88   Pulse 94   Temp 98 °F (36.7 °C)

## 2024-02-04 ENCOUNTER — APPOINTMENT (OUTPATIENT)
Dept: GENERAL RADIOLOGY | Age: 59
DRG: 166 | End: 2024-02-04
Attending: THORACIC SURGERY (CARDIOTHORACIC VASCULAR SURGERY)
Payer: COMMERCIAL

## 2024-02-04 LAB
ANION GAP SERPL CALCULATED.3IONS-SCNC: 10 MMOL/L (ref 7–16)
APTT: 32.4 SECONDS (ref 25.1–37.1)
BASOPHILS ABSOLUTE: 0 K/CU MM
BASOPHILS RELATIVE PERCENT: 0.5 % (ref 0–1)
BUN SERPL-MCNC: 13 MG/DL (ref 6–23)
CALCIUM IONIZED: 4.6 MG/DL (ref 4.48–5.28)
CALCIUM SERPL-MCNC: 8.2 MG/DL (ref 8.3–10.6)
CHLORIDE BLD-SCNC: 103 MMOL/L (ref 99–110)
CO2: 27 MMOL/L (ref 21–32)
CREAT SERPL-MCNC: 1 MG/DL (ref 0.9–1.3)
DIFFERENTIAL TYPE: ABNORMAL
EOSINOPHILS ABSOLUTE: 0.1 K/CU MM
EOSINOPHILS RELATIVE PERCENT: 2 % (ref 0–3)
FIBRINOGEN LEVEL: 772 MG/DL (ref 170–540)
GFR SERPL CREATININE-BSD FRML MDRD: >60 ML/MIN/1.73M2
GLUCOSE BLD-MCNC: 101 MG/DL (ref 70–99)
GLUCOSE BLD-MCNC: 118 MG/DL (ref 70–99)
GLUCOSE BLD-MCNC: 128 MG/DL (ref 70–99)
GLUCOSE BLD-MCNC: 189 MG/DL (ref 70–99)
GLUCOSE SERPL-MCNC: 141 MG/DL (ref 70–99)
HCT VFR BLD CALC: 33.1 % (ref 42–52)
HEMOGLOBIN: 10.5 GM/DL (ref 13.5–18)
IMMATURE NEUTROPHIL %: 1.2 % (ref 0–0.43)
INR BLD: 1.1 INDEX
IONIZED CA: 1.15 MMOL/L (ref 1.12–1.32)
LYMPHOCYTES ABSOLUTE: 1.1 K/CU MM
LYMPHOCYTES RELATIVE PERCENT: 17.8 % (ref 24–44)
MAGNESIUM: 2.4 MG/DL (ref 1.8–2.4)
MCH RBC QN AUTO: 27.5 PG (ref 27–31)
MCHC RBC AUTO-ENTMCNC: 31.7 % (ref 32–36)
MCV RBC AUTO: 86.6 FL (ref 78–100)
MONOCYTES ABSOLUTE: 0.6 K/CU MM
MONOCYTES RELATIVE PERCENT: 10 % (ref 0–4)
NUCLEATED RBC %: 0 %
PDW BLD-RTO: 12.1 % (ref 11.7–14.9)
PHOSPHORUS: 3.5 MG/DL (ref 2.5–4.9)
PLATELET # BLD: 275 K/CU MM (ref 140–440)
PMV BLD AUTO: 8.8 FL (ref 7.5–11.1)
POTASSIUM SERPL-SCNC: 4.1 MMOL/L (ref 3.5–5.1)
PROTHROMBIN TIME: 13.9 SECONDS (ref 11.7–14.5)
RBC # BLD: 3.82 M/CU MM (ref 4.6–6.2)
SEGMENTED NEUTROPHILS ABSOLUTE COUNT: 4.1 K/CU MM
SEGMENTED NEUTROPHILS RELATIVE PERCENT: 68.5 % (ref 36–66)
SODIUM BLD-SCNC: 140 MMOL/L (ref 135–145)
TOTAL IMMATURE NEUTOROPHIL: 0.07 K/CU MM
TOTAL NUCLEATED RBC: 0 K/CU MM
WBC # BLD: 6 K/CU MM (ref 4–10.5)

## 2024-02-04 PROCEDURE — 6370000000 HC RX 637 (ALT 250 FOR IP): Performed by: INTERNAL MEDICINE

## 2024-02-04 PROCEDURE — 71046 X-RAY EXAM CHEST 2 VIEWS: CPT

## 2024-02-04 PROCEDURE — 6370000000 HC RX 637 (ALT 250 FOR IP): Performed by: PHYSICIAN ASSISTANT

## 2024-02-04 PROCEDURE — 6370000000 HC RX 637 (ALT 250 FOR IP): Performed by: STUDENT IN AN ORGANIZED HEALTH CARE EDUCATION/TRAINING PROGRAM

## 2024-02-04 PROCEDURE — 83735 ASSAY OF MAGNESIUM: CPT

## 2024-02-04 PROCEDURE — 85025 COMPLETE CBC W/AUTO DIFF WBC: CPT

## 2024-02-04 PROCEDURE — 2580000003 HC RX 258: Performed by: PHYSICIAN ASSISTANT

## 2024-02-04 PROCEDURE — 94761 N-INVAS EAR/PLS OXIMETRY MLT: CPT

## 2024-02-04 PROCEDURE — 6360000002 HC RX W HCPCS: Performed by: PHYSICIAN ASSISTANT

## 2024-02-04 PROCEDURE — 82962 GLUCOSE BLOOD TEST: CPT

## 2024-02-04 PROCEDURE — 80048 BASIC METABOLIC PNL TOTAL CA: CPT

## 2024-02-04 PROCEDURE — 82330 ASSAY OF CALCIUM: CPT

## 2024-02-04 PROCEDURE — 85730 THROMBOPLASTIN TIME PARTIAL: CPT

## 2024-02-04 PROCEDURE — 94669 MECHANICAL CHEST WALL OSCILL: CPT

## 2024-02-04 PROCEDURE — 85610 PROTHROMBIN TIME: CPT

## 2024-02-04 PROCEDURE — 99232 SBSQ HOSP IP/OBS MODERATE 35: CPT | Performed by: INTERNAL MEDICINE

## 2024-02-04 PROCEDURE — 94664 DEMO&/EVAL PT USE INHALER: CPT

## 2024-02-04 PROCEDURE — 85384 FIBRINOGEN ACTIVITY: CPT

## 2024-02-04 PROCEDURE — 2060000000 HC ICU INTERMEDIATE R&B

## 2024-02-04 PROCEDURE — 84100 ASSAY OF PHOSPHORUS: CPT

## 2024-02-04 RX ORDER — TRAZODONE HYDROCHLORIDE 50 MG/1
50 TABLET ORAL NIGHTLY PRN
Status: DISCONTINUED | OUTPATIENT
Start: 2024-02-04 | End: 2024-02-06 | Stop reason: HOSPADM

## 2024-02-04 RX ADMIN — OXYCODONE HYDROCHLORIDE 5 MG: 5 TABLET ORAL at 20:39

## 2024-02-04 RX ADMIN — BACITRACIN: 500 OINTMENT TOPICAL at 09:47

## 2024-02-04 RX ADMIN — ENOXAPARIN SODIUM 30 MG: 100 INJECTION SUBCUTANEOUS at 09:48

## 2024-02-04 RX ADMIN — SENNOSIDES, DOCUSATE SODIUM 1 TABLET: 8.6; 5 TABLET ORAL at 20:39

## 2024-02-04 RX ADMIN — GABAPENTIN 300 MG: 300 CAPSULE ORAL at 09:48

## 2024-02-04 RX ADMIN — ENALAPRIL MALEATE 5 MG: 5 TABLET ORAL at 09:49

## 2024-02-04 RX ADMIN — ACETAMINOPHEN 1000 MG: 500 TABLET ORAL at 20:39

## 2024-02-04 RX ADMIN — GABAPENTIN 300 MG: 300 CAPSULE ORAL at 20:39

## 2024-02-04 RX ADMIN — Medication 1 TABLET: at 09:49

## 2024-02-04 RX ADMIN — CHLORHEXIDINE GLUCONATE 0.12% ORAL RINSE 15 ML: 1.2 LIQUID ORAL at 20:39

## 2024-02-04 RX ADMIN — ATORVASTATIN CALCIUM 40 MG: 40 TABLET, FILM COATED ORAL at 20:39

## 2024-02-04 RX ADMIN — CHLORHEXIDINE GLUCONATE 0.12% ORAL RINSE 15 ML: 1.2 LIQUID ORAL at 09:47

## 2024-02-04 RX ADMIN — GABAPENTIN 300 MG: 300 CAPSULE ORAL at 13:47

## 2024-02-04 RX ADMIN — OXYCODONE HYDROCHLORIDE 5 MG: 5 TABLET ORAL at 05:23

## 2024-02-04 RX ADMIN — FAMOTIDINE 20 MG: 20 TABLET ORAL at 20:39

## 2024-02-04 RX ADMIN — ENOXAPARIN SODIUM 30 MG: 100 INJECTION SUBCUTANEOUS at 20:39

## 2024-02-04 RX ADMIN — FUROSEMIDE 40 MG: 10 INJECTION, SOLUTION INTRAMUSCULAR; INTRAVENOUS at 09:47

## 2024-02-04 RX ADMIN — SERTRALINE HYDROCHLORIDE 100 MG: 50 TABLET ORAL at 09:49

## 2024-02-04 RX ADMIN — INSULIN GLARGINE 14 UNITS: 100 INJECTION, SOLUTION SUBCUTANEOUS at 21:09

## 2024-02-04 RX ADMIN — POTASSIUM CHLORIDE 20 MEQ: 1500 TABLET, EXTENDED RELEASE ORAL at 09:48

## 2024-02-04 RX ADMIN — SODIUM CHLORIDE, PRESERVATIVE FREE 10 ML: 5 INJECTION INTRAVENOUS at 09:49

## 2024-02-04 RX ADMIN — INSULIN LISPRO 2 UNITS: 100 INJECTION, SOLUTION INTRAVENOUS; SUBCUTANEOUS at 11:27

## 2024-02-04 RX ADMIN — METOPROLOL TARTRATE 37.5 MG: 25 TABLET, FILM COATED ORAL at 09:48

## 2024-02-04 RX ADMIN — CLOPIDOGREL BISULFATE 75 MG: 75 TABLET ORAL at 09:48

## 2024-02-04 RX ADMIN — ACETAMINOPHEN 1000 MG: 500 TABLET ORAL at 13:47

## 2024-02-04 RX ADMIN — ASPIRIN 81 MG 81 MG: 81 TABLET ORAL at 09:48

## 2024-02-04 RX ADMIN — SENNOSIDES, DOCUSATE SODIUM 1 TABLET: 8.6; 5 TABLET ORAL at 09:48

## 2024-02-04 RX ADMIN — INSULIN GLARGINE 14 UNITS: 100 INJECTION, SOLUTION SUBCUTANEOUS at 09:47

## 2024-02-04 RX ADMIN — POLYETHYLENE GLYCOL (3350) 17 G: 17 POWDER, FOR SOLUTION ORAL at 09:49

## 2024-02-04 RX ADMIN — SODIUM CHLORIDE, PRESERVATIVE FREE 10 ML: 5 INJECTION INTRAVENOUS at 20:41

## 2024-02-04 RX ADMIN — ACETAMINOPHEN 1000 MG: 500 TABLET ORAL at 07:03

## 2024-02-04 RX ADMIN — METOPROLOL TARTRATE 37.5 MG: 25 TABLET, FILM COATED ORAL at 20:38

## 2024-02-04 RX ADMIN — FAMOTIDINE 20 MG: 20 TABLET ORAL at 09:49

## 2024-02-04 ASSESSMENT — PAIN DESCRIPTION - ORIENTATION: ORIENTATION: MID;LOWER

## 2024-02-04 ASSESSMENT — PAIN SCALES - GENERAL
PAINLEVEL_OUTOF10: 7
PAINLEVEL_OUTOF10: 0
PAINLEVEL_OUTOF10: 8

## 2024-02-04 ASSESSMENT — PAIN DESCRIPTION - ONSET: ONSET: ON-GOING

## 2024-02-04 ASSESSMENT — PAIN DESCRIPTION - PAIN TYPE: TYPE: SURGICAL PAIN

## 2024-02-04 ASSESSMENT — PAIN - FUNCTIONAL ASSESSMENT: PAIN_FUNCTIONAL_ASSESSMENT: ACTIVITIES ARE NOT PREVENTED

## 2024-02-04 ASSESSMENT — PAIN DESCRIPTION - DESCRIPTORS: DESCRIPTORS: ACHING;DISCOMFORT

## 2024-02-04 ASSESSMENT — PAIN DESCRIPTION - LOCATION: LOCATION: CHEST

## 2024-02-04 ASSESSMENT — PAIN DESCRIPTION - FREQUENCY: FREQUENCY: CONTINUOUS

## 2024-02-04 NOTE — PROGRESS NOTES
[facility] Cardiothoracic Surgery  Daily Progress Note    Surgeon:  Dr. Kaden Trejo       Subjective:  Mr. Figueroa is a 58 y.o. year-old male status post CORONARY ARTERY BYPASS GRAFT X2, LIMA - LAD, SVG - PDA; LEFT ENDOSCOPIC GREATER SAPHENOUS VEIN HARVEST; INTRAOPERATIVE TRANSESOPHAGEAL ECHOCARDIOGRAM  on 1/29/24.      Patient was seen and examined at bedside this morning without any complaints.afebrile.       Hospital Course:  1/30/24 extubated to NC overnight. Wean dheeraj then epi for goal MAP>65. 500cc albumin 5%. Keep chest tubes and central line.    1/31/24 Increase metoprolol to 25mg PO BID. Lasix 40mg + K 20meq IV BID today. Extra 40 IV lasix this morning for total of 80 mg. Discontinue milrinone   2/1/24 Lasix 40mg IV daily. ID consult for antibiotic recommendations. Panculture. remove chest tubes   2/2/24: Lasix 40IV daily. ASA and Plavix. Increase Metoprolol to 37.5mg PO BID     2/3/24: Continue ASA/Plavix/Lasix/Metoprolol. Medically ready for DC.  2/4/24    Vital Signs: BP (!) 153/94   Pulse 80   Temp 97.6 °F (36.4 °C) (Oral)   Resp 17   Ht 1.753 m (5' 9\")   Wt 118.4 kg (261 lb)   SpO2 95%   BMI 38.54 kg/m²  O2 Flow Rate (L/min): 0 L/min   Admit Weight: Weight - Scale: 120.3 kg (265 lb 2 oz)   WEIGHTWeight - Scale: 118.4 kg (261 lb)     I/O's:  I/O last 3 completed shifts:  In: 1000 [P.O.:1000]  Out: 8025 [Urine:8025]    Data:    CBC:   Recent Labs     02/02/24  0504 02/03/24  0445 02/04/24  0452   WBC 8.7 7.0 6.0   HGB 9.7* 9.9* 10.5*   HCT 30.5* 30.6* 33.1*   MCV 87.4 86.0 86.6    235 275       BMP:   Recent Labs     02/02/24  0504 02/03/24 0445 02/04/24  0452   * 138 140   K 4.0 4.1 4.1   CL 94* 101 103   CO2 27 28 27   PHOS 2.3* 3.0 3.5   BUN 13 13 13   CREATININE 1.0 0.9 1.0       PT/INR:   Recent Labs     02/02/24  0504 02/03/24  0445 02/04/24  0452   PROTIME 14.1 14.2 13.9   INR 1.1 1.1 1.1       APTT:   Recent Labs     02/02/24  0504

## 2024-02-04 NOTE — PROGRESS NOTES
CARDIOLOGY PROGRESS NOTE                                                  Name:  Jose Figueroa /Age/Sex: 1965  (58 y.o. male)   MRN & CSN:  [id number] & [id number] Admission Date/Time: 2024  9:54 AM   Location:  -A PCP: Arabella Boles MD         Admit Date:  2024  Hospital Day: 7      SUBJECTIVE:     Seen patient as follow up as consultation for CABG    No chest pain.  No shortness of breath  No palpations    TELEMETRY: SR         Intake/Output Summary (Last 24 hours) at 2024 1451  Last data filed at 2024 0600  Gross per 24 hour   Intake 750 ml   Output 3275 ml   Net -2525 ml         Assessment/Plan:          Coronary disease s/p CABG x 2 LIMA LAD, SVG PDA  Essential hypertension  Hyperlipidemia  Hypokalemia    Continue with aspirin 81 mg, Plavix 25 mg p.o. daily  Continue with blood pressure control with Vasotec 5 mg daily, metoprolol tartrate 37.5 mg p.o. twice daily  Continue with Lasix 40 mg p.o. twice daily  High intensity statins: Lipitor 40 daily  Cardiac rehab as outpatient      Past medical history:    has a past medical history of CVA (cerebral vascular accident) (HCC), Diabetes mellitus (HCC), Hyperlipidemia, and Hypertension.  Past surgical history:   has a past surgical history that includes brain surgery; Cardiac procedure (N/A, 2024); invasive vascular (N/A, 2024); and Coronary artery bypass graft (N/A, 2024).  Social History:   reports that he quit smoking about 21 years ago. His smoking use included cigarettes and cigars. He started smoking about 56 years ago. He has a 45.0 pack-year smoking history. He has never used smokeless tobacco. He reports that he does not currently use alcohol. He reports that he does not use drugs.  Family history:  family history includes Diabetes in his maternal grandmother; Hypertension in his maternal uncle; Stroke in his maternal grandfather.    OBJECTIVE:     /86   Pulse 77   Temp 97.9 °F (36.6 °C)

## 2024-02-04 NOTE — CARE COORDINATION
CM spoke with ARU admissions, the patient's pre-cert is still pending at this time.  CM will follow.

## 2024-02-05 ENCOUNTER — APPOINTMENT (OUTPATIENT)
Dept: GENERAL RADIOLOGY | Age: 59
DRG: 166 | End: 2024-02-05
Attending: THORACIC SURGERY (CARDIOTHORACIC VASCULAR SURGERY)
Payer: COMMERCIAL

## 2024-02-05 LAB
ANION GAP SERPL CALCULATED.3IONS-SCNC: 10 MMOL/L (ref 7–16)
APTT: 33.8 SECONDS (ref 25.1–37.1)
BASOPHILS ABSOLUTE: 0 K/CU MM
BASOPHILS RELATIVE PERCENT: 0.2 % (ref 0–1)
BUN SERPL-MCNC: 13 MG/DL (ref 6–23)
CALCIUM IONIZED: 4.56 MG/DL (ref 4.48–5.28)
CALCIUM SERPL-MCNC: 7.9 MG/DL (ref 8.3–10.6)
CHLORIDE BLD-SCNC: 99 MMOL/L (ref 99–110)
CO2: 26 MMOL/L (ref 21–32)
CREAT SERPL-MCNC: 1 MG/DL (ref 0.9–1.3)
DIFFERENTIAL TYPE: ABNORMAL
EOSINOPHILS ABSOLUTE: 0.1 K/CU MM
EOSINOPHILS RELATIVE PERCENT: 1.6 % (ref 0–3)
FIBRINOGEN LEVEL: 709 MG/DL (ref 170–540)
GFR SERPL CREATININE-BSD FRML MDRD: >60 ML/MIN/1.73M2
GLUCOSE BLD-MCNC: 128 MG/DL (ref 70–99)
GLUCOSE BLD-MCNC: 130 MG/DL (ref 70–99)
GLUCOSE BLD-MCNC: 145 MG/DL (ref 70–99)
GLUCOSE BLD-MCNC: 148 MG/DL (ref 70–99)
GLUCOSE SERPL-MCNC: 135 MG/DL (ref 70–99)
HCT VFR BLD CALC: 33.5 % (ref 42–52)
HEMOGLOBIN: 10.6 GM/DL (ref 13.5–18)
IMMATURE NEUTROPHIL %: 1.1 % (ref 0–0.43)
INR BLD: 1.1 INDEX
IONIZED CA: 1.14 MMOL/L (ref 1.12–1.32)
LYMPHOCYTES ABSOLUTE: 1.1 K/CU MM
LYMPHOCYTES RELATIVE PERCENT: 13 % (ref 24–44)
MAGNESIUM: 2.3 MG/DL (ref 1.8–2.4)
MCH RBC QN AUTO: 27.7 PG (ref 27–31)
MCHC RBC AUTO-ENTMCNC: 31.6 % (ref 32–36)
MCV RBC AUTO: 87.7 FL (ref 78–100)
MONOCYTES ABSOLUTE: 0.7 K/CU MM
MONOCYTES RELATIVE PERCENT: 8.6 % (ref 0–4)
NUCLEATED RBC %: 0 %
PDW BLD-RTO: 12 % (ref 11.7–14.9)
PHOSPHORUS: 3.6 MG/DL (ref 2.5–4.9)
PLATELET # BLD: 306 K/CU MM (ref 140–440)
PMV BLD AUTO: 8.5 FL (ref 7.5–11.1)
POTASSIUM SERPL-SCNC: 4.2 MMOL/L (ref 3.5–5.1)
PROTHROMBIN TIME: 14.4 SECONDS (ref 11.7–14.5)
RBC # BLD: 3.82 M/CU MM (ref 4.6–6.2)
SEGMENTED NEUTROPHILS ABSOLUTE COUNT: 6.2 K/CU MM
SEGMENTED NEUTROPHILS RELATIVE PERCENT: 75.5 % (ref 36–66)
SODIUM BLD-SCNC: 135 MMOL/L (ref 135–145)
TOTAL IMMATURE NEUTOROPHIL: 0.09 K/CU MM
TOTAL NUCLEATED RBC: 0 K/CU MM
WBC # BLD: 8.2 K/CU MM (ref 4–10.5)

## 2024-02-05 PROCEDURE — 2700000000 HC OXYGEN THERAPY PER DAY

## 2024-02-05 PROCEDURE — 97530 THERAPEUTIC ACTIVITIES: CPT

## 2024-02-05 PROCEDURE — 2060000000 HC ICU INTERMEDIATE R&B

## 2024-02-05 PROCEDURE — 6360000002 HC RX W HCPCS: Performed by: PHYSICIAN ASSISTANT

## 2024-02-05 PROCEDURE — 6370000000 HC RX 637 (ALT 250 FOR IP): Performed by: THORACIC SURGERY (CARDIOTHORACIC VASCULAR SURGERY)

## 2024-02-05 PROCEDURE — 94640 AIRWAY INHALATION TREATMENT: CPT

## 2024-02-05 PROCEDURE — 85384 FIBRINOGEN ACTIVITY: CPT

## 2024-02-05 PROCEDURE — 85730 THROMBOPLASTIN TIME PARTIAL: CPT

## 2024-02-05 PROCEDURE — 97110 THERAPEUTIC EXERCISES: CPT

## 2024-02-05 PROCEDURE — 6370000000 HC RX 637 (ALT 250 FOR IP): Performed by: PHYSICIAN ASSISTANT

## 2024-02-05 PROCEDURE — 85610 PROTHROMBIN TIME: CPT

## 2024-02-05 PROCEDURE — 84100 ASSAY OF PHOSPHORUS: CPT

## 2024-02-05 PROCEDURE — 82330 ASSAY OF CALCIUM: CPT

## 2024-02-05 PROCEDURE — 2580000003 HC RX 258: Performed by: PHYSICIAN ASSISTANT

## 2024-02-05 PROCEDURE — 83735 ASSAY OF MAGNESIUM: CPT

## 2024-02-05 PROCEDURE — 82962 GLUCOSE BLOOD TEST: CPT

## 2024-02-05 PROCEDURE — 36415 COLL VENOUS BLD VENIPUNCTURE: CPT

## 2024-02-05 PROCEDURE — 94761 N-INVAS EAR/PLS OXIMETRY MLT: CPT

## 2024-02-05 PROCEDURE — 6370000000 HC RX 637 (ALT 250 FOR IP): Performed by: STUDENT IN AN ORGANIZED HEALTH CARE EDUCATION/TRAINING PROGRAM

## 2024-02-05 PROCEDURE — 6370000000 HC RX 637 (ALT 250 FOR IP): Performed by: INTERNAL MEDICINE

## 2024-02-05 PROCEDURE — 99232 SBSQ HOSP IP/OBS MODERATE 35: CPT | Performed by: INTERNAL MEDICINE

## 2024-02-05 PROCEDURE — 80048 BASIC METABOLIC PNL TOTAL CA: CPT

## 2024-02-05 PROCEDURE — 71046 X-RAY EXAM CHEST 2 VIEWS: CPT

## 2024-02-05 PROCEDURE — 97116 GAIT TRAINING THERAPY: CPT

## 2024-02-05 PROCEDURE — 85025 COMPLETE CBC W/AUTO DIFF WBC: CPT

## 2024-02-05 RX ORDER — POTASSIUM CHLORIDE 20 MEQ/1
10 TABLET, EXTENDED RELEASE ORAL
Status: DISCONTINUED | OUTPATIENT
Start: 2024-02-06 | End: 2024-02-06 | Stop reason: HOSPADM

## 2024-02-05 RX ORDER — FUROSEMIDE 10 MG/ML
20 INJECTION INTRAMUSCULAR; INTRAVENOUS DAILY
Status: DISCONTINUED | OUTPATIENT
Start: 2024-02-06 | End: 2024-02-06 | Stop reason: HOSPADM

## 2024-02-05 RX ADMIN — POTASSIUM CHLORIDE 20 MEQ: 1500 TABLET, EXTENDED RELEASE ORAL at 08:09

## 2024-02-05 RX ADMIN — GABAPENTIN 300 MG: 300 CAPSULE ORAL at 14:10

## 2024-02-05 RX ADMIN — METOPROLOL TARTRATE 37.5 MG: 25 TABLET, FILM COATED ORAL at 20:40

## 2024-02-05 RX ADMIN — SENNOSIDES, DOCUSATE SODIUM 1 TABLET: 8.6; 5 TABLET ORAL at 08:09

## 2024-02-05 RX ADMIN — INSULIN GLARGINE 14 UNITS: 100 INJECTION, SOLUTION SUBCUTANEOUS at 08:08

## 2024-02-05 RX ADMIN — ACETAMINOPHEN 1000 MG: 500 TABLET ORAL at 05:12

## 2024-02-05 RX ADMIN — INSULIN GLARGINE 14 UNITS: 100 INJECTION, SOLUTION SUBCUTANEOUS at 20:40

## 2024-02-05 RX ADMIN — CLOPIDOGREL BISULFATE 75 MG: 75 TABLET ORAL at 08:08

## 2024-02-05 RX ADMIN — POLYETHYLENE GLYCOL (3350) 17 G: 17 POWDER, FOR SOLUTION ORAL at 08:08

## 2024-02-05 RX ADMIN — SERTRALINE HYDROCHLORIDE 100 MG: 50 TABLET ORAL at 08:09

## 2024-02-05 RX ADMIN — TRAZODONE HYDROCHLORIDE 50 MG: 50 TABLET ORAL at 20:40

## 2024-02-05 RX ADMIN — ACETAMINOPHEN 1000 MG: 500 TABLET ORAL at 20:40

## 2024-02-05 RX ADMIN — SODIUM CHLORIDE, PRESERVATIVE FREE 10 ML: 5 INJECTION INTRAVENOUS at 20:39

## 2024-02-05 RX ADMIN — GABAPENTIN 300 MG: 300 CAPSULE ORAL at 08:09

## 2024-02-05 RX ADMIN — BACITRACIN: 500 OINTMENT TOPICAL at 08:12

## 2024-02-05 RX ADMIN — ENALAPRIL MALEATE 5 MG: 5 TABLET ORAL at 08:08

## 2024-02-05 RX ADMIN — HYDRALAZINE HYDROCHLORIDE 10 MG: 20 INJECTION INTRAMUSCULAR; INTRAVENOUS at 05:38

## 2024-02-05 RX ADMIN — FAMOTIDINE 20 MG: 20 TABLET ORAL at 20:40

## 2024-02-05 RX ADMIN — ENOXAPARIN SODIUM 30 MG: 100 INJECTION SUBCUTANEOUS at 20:40

## 2024-02-05 RX ADMIN — GABAPENTIN 300 MG: 300 CAPSULE ORAL at 20:40

## 2024-02-05 RX ADMIN — CHLORHEXIDINE GLUCONATE 0.12% ORAL RINSE 15 ML: 1.2 LIQUID ORAL at 20:40

## 2024-02-05 RX ADMIN — CHLORHEXIDINE GLUCONATE 0.12% ORAL RINSE 15 ML: 1.2 LIQUID ORAL at 08:08

## 2024-02-05 RX ADMIN — ASPIRIN 81 MG 81 MG: 81 TABLET ORAL at 08:09

## 2024-02-05 RX ADMIN — IPRATROPIUM BROMIDE AND ALBUTEROL SULFATE 1 DOSE: 2.5; .5 SOLUTION RESPIRATORY (INHALATION) at 08:24

## 2024-02-05 RX ADMIN — BACITRACIN: 500 OINTMENT TOPICAL at 20:41

## 2024-02-05 RX ADMIN — ACETAMINOPHEN 1000 MG: 500 TABLET ORAL at 14:09

## 2024-02-05 RX ADMIN — FAMOTIDINE 20 MG: 20 TABLET ORAL at 08:09

## 2024-02-05 RX ADMIN — FUROSEMIDE 40 MG: 10 INJECTION, SOLUTION INTRAMUSCULAR; INTRAVENOUS at 08:08

## 2024-02-05 RX ADMIN — METOPROLOL TARTRATE 37.5 MG: 25 TABLET, FILM COATED ORAL at 08:09

## 2024-02-05 RX ADMIN — OXYCODONE HYDROCHLORIDE 5 MG: 5 TABLET ORAL at 05:12

## 2024-02-05 RX ADMIN — ATORVASTATIN CALCIUM 40 MG: 40 TABLET, FILM COATED ORAL at 20:40

## 2024-02-05 RX ADMIN — SODIUM CHLORIDE, PRESERVATIVE FREE 10 ML: 5 INJECTION INTRAVENOUS at 08:09

## 2024-02-05 RX ADMIN — ENOXAPARIN SODIUM 30 MG: 100 INJECTION SUBCUTANEOUS at 08:08

## 2024-02-05 RX ADMIN — SENNOSIDES, DOCUSATE SODIUM 1 TABLET: 8.6; 5 TABLET ORAL at 20:40

## 2024-02-05 RX ADMIN — Medication 1 TABLET: at 08:08

## 2024-02-05 ASSESSMENT — PAIN DESCRIPTION - DESCRIPTORS: DESCRIPTORS: ACHING;DISCOMFORT

## 2024-02-05 ASSESSMENT — PAIN SCALES - GENERAL
PAINLEVEL_OUTOF10: 0
PAINLEVEL_OUTOF10: 5

## 2024-02-05 ASSESSMENT — PAIN DESCRIPTION - LOCATION: LOCATION: GENERALIZED

## 2024-02-05 NOTE — CONSENT
Informed Consent for Blood Component Transfusion Note    I have discussed with the patient the rationale for blood component transfusion; its benefits in treating or preventing fatigue, organ damage, or death; and its risk which includes mild transfusion reactions, rare risk of blood borne infection, or more serious but rare reactions. I have discussed the alternatives to transfusion, including the risk and consequences of not receiving transfusion. The patient had an opportunity to ask questions and had agreed to proceed with transfusion of blood components.    Electronically signed by Kaden Trejo MD on 2/5/24 at 8:03 AM EST

## 2024-02-05 NOTE — PROGRESS NOTES
CARDIOLOGY PROGRESS NOTE                                                  Name:  Jose Figueroa /Age/Sex: 1965  (58 y.o. male)   MRN & CSN:  [id number] & [id number] Admission Date/Time: 2024  9:54 AM   Location:  -A PCP: Arabella Boles MD         Admit Date:  2024  Hospital Day: 8      SUBJECTIVE:     Seen patient as follow up as consultation for CABG    No chest pain.  No shortness of breath  No palpations    TELEMETRY: SR         Intake/Output Summary (Last 24 hours) at 2024 1740  Last data filed at 2024 0800  Gross per 24 hour   Intake --   Output 2800 ml   Net -2800 ml         Assessment/Plan:          Coronary disease s/p CABG x 2 LIMA LAD, SVG PDA  Essential hypertension  Hyperlipidemia  Hypokalemia    Lasix dose decreased to 20 daily  Doing fairly well  Continue with aspirin 81 mg, Plavix 25 mg p.o. daily  Continue with blood pressure control with Vasotec 5 mg daily, metoprolol tartrate 37.5 mg p.o. twice daily  High intensity statins: Lipitor 40 daily  Cardiac rehab as outpatient  Please call cardiology for any further questions      Past medical history:    has a past medical history of CVA (cerebral vascular accident) (HCC), Diabetes mellitus (HCC), Hyperlipidemia, and Hypertension.  Past surgical history:   has a past surgical history that includes brain surgery; Cardiac procedure (N/A, 2024); invasive vascular (N/A, 2024); and Coronary artery bypass graft (N/A, 2024).  Social History:   reports that he quit smoking about 21 years ago. His smoking use included cigarettes and cigars. He started smoking about 56 years ago. He has a 45.0 pack-year smoking history. He has never used smokeless tobacco. He reports that he does not currently use alcohol. He reports that he does not use drugs.  Family history:  family history includes Diabetes in his maternal grandmother; Hypertension in his maternal uncle; Stroke in his maternal  grandfather.    OBJECTIVE:     BP (!) 151/114   Pulse 99   Temp 98 °F (36.7 °C) (Oral)   Resp 29   Ht 1.753 m (5' 9\")   Wt 119 kg (262 lb 5.6 oz)   SpO2 98%   BMI 38.74 kg/m²     Intake/Output Summary (Last 24 hours) at 2/5/2024 1740  Last data filed at 2/5/2024 0800  Gross per 24 hour   Intake --   Output 2800 ml   Net -2800 ml         Physical Exam:    Constitutional:  Well developed   HENT:  Normocephalic   Eyes:  Conjunctiva normal, No discharge.   Respiratory:  Normal breath sounds, No respiratory distress   Cardiovascular POST OP S1-S2 No Murmurs, added sounds.  Normal rate rhythm.  No rubs gallops.   Pedal pulses normal   pedal edema  GI:  Soft   : No CVA tenderness.   Musculoskeletal:  No tenderness,   Integument:  Warm, Dry, No erythema, No rash.   Lymphatic:  No lymphadenopathy noted.   Neurologic:    Alert & oriented x 3   Psychiatric:    Mood normal.           MEDICATIONS:     [START ON 2/6/2024] furosemide  20 mg IntraVENous Daily    [START ON 2/6/2024] potassium chloride  10 mEq Oral Daily with breakfast    insulin glargine  14 Units SubCUTAneous BID    metoprolol tartrate  37.5 mg Oral BID    enalapril  5 mg Oral Daily    insulin lispro  0-16 Units SubCUTAneous 4x Daily WC    acetaminophen  1,000 mg Oral 3 times per day    sertraline  100 mg Oral Daily    bacitracin   Topical BID    gabapentin  300 mg Oral TID    sodium chloride flush  5-40 mL IntraVENous 2 times per day    enoxaparin  30 mg SubCUTAneous BID    clopidogrel  75 mg Oral Daily    chlorhexidine  15 mL Mouth/Throat BID    multivitamin  1 tablet Oral Daily with breakfast    polyethylene glycol  17 g Oral Daily    sennosides-docusate sodium  1 tablet Oral BID    atorvastatin  40 mg Oral Nightly    famotidine  20 mg Oral BID    Or    famotidine (PEPCID) injection  20 mg IntraVENous BID    aspirin  81 mg Oral Daily      sodium chloride      phenylephrine 100 mcg/min (01/30/24 1005)    dextrose       traZODone, ipratropium 0.5

## 2024-02-05 NOTE — PROGRESS NOTES
Comprehensive Nutrition Assessment    Type and Reason for Visit:  Initial, RD Nutrition Re-Screen/LOS    Nutrition Recommendations/Plan:   Continue current diet order at this time   Begin diabetic oral nutrition supplement daily   Monitor PO intakes, GI status, weights, fluids, labs, lytes, glucose, and plan of care      Nutrition Assessment:    Admitted with CAD s/p CABG x 2. Currently on carb controlled, cardiac diet. Good appetite, consuming % of most meals. Some weight loss s/s fluid losses, lasix IV infusion. Discussed heart healthy nutrition guidelines. At home, pt cooks for son and self. Uses dexter grease, butter, and sometimes salt with meals but willing to make changes. Pt seems motivated and ready for dietary change. Will start diabetic supplement to optimize nutrition as pt plans for rehab post d/c. Low nutrition risk at this time.    Nutrition Related Findings:    POCT 145 Wound Type: Surgical Incision       Current Nutrition Intake & Therapies:    Average Meal Intake: %, 51-75%  Average Supplements Intake: None Ordered  ADULT DIET; Regular; 4 carb choices (60 gm/meal); Low Fat/Low Chol/High Fiber/2 gm Na    Anthropometric Measures:  Height: 175.3 cm (5' 9\")  Ideal Body Weight (IBW): 160 lbs (73 kg)    Current Body Weight: 119 kg (262 lb 5.6 oz), 164 % IBW. Weight Source: Bed Scale  Current BMI (kg/m2): 38.7  Weight Adjustment For: No Adjustment  BMI Categories: Obese Class 2 (BMI 35.0 -39.9)      Nutrition Diagnosis:   Predicted inadequate energy intake related to acute injury/trauma, cardiac dysfunction as evidenced by wounds (s/p CABG)    Nutrition Interventions:   Food and/or Nutrient Delivery: Continue Current Diet, Start Oral Nutrition Supplement  Nutrition Education/Counseling: Survival skills/brief education completed, Education initiated (Handouts explained and provided, coupon provided)  Coordination of Nutrition Care: Continue to monitor while inpatient, Coordination of Care  Plan

## 2024-02-05 NOTE — PROGRESS NOTES
Pt's educated on removal of pacing wires. Pacing wires removed per order per protocol, tip intact without any tissue, Pt without ectopy and VSS, vital signs increased to [id number], pt denies any questions at this time. Will continue to monitor closely.       ELZA HughesN RN

## 2024-02-05 NOTE — PROGRESS NOTES
[facility] Cardiothoracic Surgery  Daily Progress Note    Surgeon:  Dr. Kaden Trejo         Subjective:  Mr. Figueroa is a 58 y.o. year-old male status post CORONARY ARTERY BYPASS GRAFT X2, LIMA - LAD, SVG - PDA; LEFT ENDOSCOPIC GREATER SAPHENOUS VEIN HARVEST; INTRAOPERATIVE TRANSESOPHAGEAL ECHOCARDIOGRAM  on 1/29/24.       Patient was seen and examined at bedside this morning without any complaints. Feeling good. Ready for discharge, awaiting insurance certification.         Hospital Course:  1/30/24 extubated to NC overnight. Wean dheeraj then epi for goal MAP>65. 500cc albumin 5%. Keep chest tubes and central line.    1/31/24 Increase metoprolol to 25mg PO BID. Lasix 40mg + K 20meq IV BID today. Extra 40 IV lasix this morning for total of 80 mg. Discontinue milrinone   2/1/24 Lasix 40mg IV daily. ID consult for antibiotic recommendations. Panculture. remove chest tubes   2/2/24: Lasix 40IV daily. ASA and Plavix. Increase Metoprolol to 37.5mg PO BID     2/3/24: Continue ASA/Plavix/Lasix/Metoprolol. Medically ready for DC.  2/4/24: Remains on Lasix 40 mg IV daily.  Medically ready for discharge.  Awaiting insurance approval.    Vital Signs: /85   Pulse 83   Temp 98.1 °F (36.7 °C) (Oral)   Resp 21   Ht 1.753 m (5' 9\")   Wt 119 kg (262 lb 5.6 oz)   SpO2 99%   BMI 38.74 kg/m²  O2 Flow Rate (L/min): 2 L/min   Admit Weight: Weight - Scale: 120.3 kg (265 lb 2 oz)   WEIGHTWeight - Scale: 119 kg (262 lb 5.6 oz)     I/O's:  I/O last 3 completed shifts:  In: 750 [P.O.:750]  Out: 4975 [Urine:4975]    Data:    CBC:   Recent Labs     02/03/24  0445 02/04/24  0452 02/05/24  0551   WBC 7.0 6.0 8.2   HGB 9.9* 10.5* 10.6*   HCT 30.6* 33.1* 33.5*   MCV 86.0 86.6 87.7    275 306     BMP:   Recent Labs     02/03/24  0445 02/04/24  0452 02/05/24  0551    140 135   K 4.1 4.1 4.2    103 99   CO2 28 27 26   PHOS 3.0 3.5 3.6   BUN 13 13 13   CREATININE 0.9 1.0 1.0

## 2024-02-05 NOTE — CARE COORDINATION
Spoke to Ritika VELASQUEZ and Dr Trejo. Precert to ARU remains pending. Will reach out to SNF of choice if precert fails. Kimberly Cabrera RN     1400 Spoke to Dalila JUNE. Family is asking for [facility] or [place] if precert to ARU fails. Kimberly Cabrera RN

## 2024-02-05 NOTE — CARE COORDINATION
ARU pre-cert still pending with [facility] at this time.  Will follow for determination.

## 2024-02-05 NOTE — PROGRESS NOTES
Nancy BERTRANDN RN clinical  for [facility] RN students 07-19:00 this date.

## 2024-02-05 NOTE — PROGRESS NOTES
Physical Therapy    Physical Therapy Treatment Note  Name: Jose Figueroa MRN: [id number] :   1965   Date:  2024   Admission Date: 2024 Room:  A   Restrictions/Precautions:        Sternal, no pushing, pulling or lifting more than 5 pounds for the first 2 weeks and then 10 pounds up to 3 months,   Arms are to support chest when changing position, coughing or sneezing. No lifting arms above 90 degrees except with the ex's    Recommended discharge to ARU    Communication with other providers:  Dalila RN states pt is ok to see for therapy  Subjective:  Patient states:  he is bored and waiting to go to rehab  Pain:   Location, Type, Intensity (0/10 to 10/10):  soreness  Objective:    Observation:  pt was in the chair  Treatment, including education/measures:  Vital Signs  HR: 76, B/P 128/85, O2 98% on 2L of NC O2  Education:   Sternal Precautions, Purpose of Exercise Program,    Intermediate Cardiac ex program:sitting  Overhead Side Stretch x 10  Ankle Pumps/ Heel Raises x 10  Marching Seated x 10  Forward Arm Raises x 10  Side Arm Raises x 10  Arm Crosses x 10  Arm Circles Forwards and Backwards x 10  SittingTrunkTwist x 10  Transfers with line management of O2  Scooting :SBA and VC's for sternal precautions  Sit to stand :min A of 1 and VC's for sternal precautions  Stand to sit :min A of 1 and VC's for sternal precautions  Gait:  Pt amb with No AD for 85 ft with CGA to min A of 1  Pt needed VC's for PLB  Pt returned to room and pt needed to return to bed for wire removal,  pt trans sit to supine with mod a for LE's and trunk  Safety  Patient left safely in the bed, with call light/phone in reach  Gait belt was used for transfers and gait.  Assessment / Impression:    Patient's tolerance of treatment:  good, improving with gt and endurance   Adverse Reaction: none  Significant change in status and impact:  progressing, unsteady with gt  Barriers to improvement:  weakness, endurance, unsteady

## 2024-02-06 ENCOUNTER — APPOINTMENT (OUTPATIENT)
Dept: GENERAL RADIOLOGY | Age: 59
DRG: 166 | End: 2024-02-06
Attending: THORACIC SURGERY (CARDIOTHORACIC VASCULAR SURGERY)
Payer: COMMERCIAL

## 2024-02-06 ENCOUNTER — HOSPITAL ENCOUNTER (INPATIENT)
Age: 59
DRG: 166 | End: 2024-02-06
Attending: PHYSICAL MEDICINE & REHABILITATION | Admitting: PHYSICAL MEDICINE & REHABILITATION
Payer: COMMERCIAL

## 2024-02-06 VITALS
WEIGHT: 255 LBS | RESPIRATION RATE: 19 BRPM | HEART RATE: 84 BPM | BODY MASS INDEX: 37.77 KG/M2 | TEMPERATURE: 97.8 F | OXYGEN SATURATION: 98 % | SYSTOLIC BLOOD PRESSURE: 119 MMHG | DIASTOLIC BLOOD PRESSURE: 90 MMHG | HEIGHT: 69 IN

## 2024-02-06 PROBLEM — I97.130 CHF FOLLOWING CARDIAC SURGERY, POSTOP: Status: ACTIVE | Noted: 2024-02-06

## 2024-02-06 LAB
ABO/RH: NORMAL
ANION GAP SERPL CALCULATED.3IONS-SCNC: 11 MMOL/L (ref 7–16)
ANTIBODY SCREEN: NEGATIVE
APTT: 31.4 SECONDS (ref 25.1–37.1)
BASOPHILS ABSOLUTE: 0.1 K/CU MM
BASOPHILS RELATIVE PERCENT: 0.7 % (ref 0–1)
BUN SERPL-MCNC: 14 MG/DL (ref 6–23)
CALCIUM IONIZED: 4.56 MG/DL (ref 4.48–5.28)
CALCIUM SERPL-MCNC: 8.7 MG/DL (ref 8.3–10.6)
CHLORIDE BLD-SCNC: 101 MMOL/L (ref 99–110)
CO2: 26 MMOL/L (ref 21–32)
COMMENT: NORMAL
COMPONENT: NORMAL
COMPONENT: NORMAL
CREAT SERPL-MCNC: 1.1 MG/DL (ref 0.9–1.3)
CROSSMATCH RESULT: NORMAL
CROSSMATCH RESULT: NORMAL
CULTURE: NORMAL
CULTURE: NORMAL
DIFFERENTIAL TYPE: ABNORMAL
EOSINOPHILS ABSOLUTE: 0.1 K/CU MM
EOSINOPHILS RELATIVE PERCENT: 1.3 % (ref 0–3)
GFR SERPL CREATININE-BSD FRML MDRD: >60 ML/MIN/1.73M2
GLUCOSE BLD-MCNC: 122 MG/DL (ref 70–99)
GLUCOSE BLD-MCNC: 125 MG/DL (ref 70–99)
GLUCOSE BLD-MCNC: 183 MG/DL (ref 70–99)
GLUCOSE SERPL-MCNC: 152 MG/DL (ref 70–99)
HCT VFR BLD CALC: 37.7 % (ref 42–52)
HEMOGLOBIN: 12.2 GM/DL (ref 13.5–18)
IMMATURE NEUTROPHIL %: 1.4 % (ref 0–0.43)
INR BLD: 1 INDEX
IONIZED CA: 1.14 MMOL/L (ref 1.12–1.32)
LYMPHOCYTES ABSOLUTE: 1.2 K/CU MM
LYMPHOCYTES RELATIVE PERCENT: 12.7 % (ref 24–44)
Lab: NORMAL
Lab: NORMAL
MAGNESIUM: 2.4 MG/DL (ref 1.8–2.4)
MCH RBC QN AUTO: 28.1 PG (ref 27–31)
MCHC RBC AUTO-ENTMCNC: 32.4 % (ref 32–36)
MCV RBC AUTO: 86.9 FL (ref 78–100)
MONOCYTES ABSOLUTE: 0.6 K/CU MM
MONOCYTES RELATIVE PERCENT: 6.5 % (ref 0–4)
NUCLEATED RBC %: 0 %
PDW BLD-RTO: 12.4 % (ref 11.7–14.9)
PHOSPHORUS: 3.8 MG/DL (ref 2.5–4.9)
PLATELET # BLD: 397 K/CU MM (ref 140–440)
PMV BLD AUTO: 8.4 FL (ref 7.5–11.1)
POTASSIUM SERPL-SCNC: 4.3 MMOL/L (ref 3.5–5.1)
PROTHROMBIN TIME: 13.6 SECONDS (ref 11.7–14.5)
RBC # BLD: 4.34 M/CU MM (ref 4.6–6.2)
SEGMENTED NEUTROPHILS ABSOLUTE COUNT: 7.5 K/CU MM
SEGMENTED NEUTROPHILS RELATIVE PERCENT: 77.4 % (ref 36–66)
SODIUM BLD-SCNC: 138 MMOL/L (ref 135–145)
SPECIMEN: NORMAL
SPECIMEN: NORMAL
STATUS: NORMAL
STATUS: NORMAL
THERMAL AMPLITUDE STUDY: NORMAL
TOTAL IMMATURE NEUTOROPHIL: 0.14 K/CU MM
TOTAL NUCLEATED RBC: 0 K/CU MM
TRANSFUSION STATUS: NORMAL
TRANSFUSION STATUS: NORMAL
UNIT DIVISION: 0
UNIT DIVISION: 0
UNIT NUMBER: NORMAL
UNIT NUMBER: NORMAL
WBC # BLD: 9.7 K/CU MM (ref 4–10.5)

## 2024-02-06 PROCEDURE — 6370000000 HC RX 637 (ALT 250 FOR IP): Performed by: STUDENT IN AN ORGANIZED HEALTH CARE EDUCATION/TRAINING PROGRAM

## 2024-02-06 PROCEDURE — 85610 PROTHROMBIN TIME: CPT

## 2024-02-06 PROCEDURE — 2580000003 HC RX 258: Performed by: PHYSICIAN ASSISTANT

## 2024-02-06 PROCEDURE — 6360000002 HC RX W HCPCS: Performed by: PHYSICIAN ASSISTANT

## 2024-02-06 PROCEDURE — 97530 THERAPEUTIC ACTIVITIES: CPT

## 2024-02-06 PROCEDURE — 6370000000 HC RX 637 (ALT 250 FOR IP): Performed by: PHYSICIAN ASSISTANT

## 2024-02-06 PROCEDURE — 85730 THROMBOPLASTIN TIME PARTIAL: CPT

## 2024-02-06 PROCEDURE — 82330 ASSAY OF CALCIUM: CPT

## 2024-02-06 PROCEDURE — 85025 COMPLETE CBC W/AUTO DIFF WBC: CPT

## 2024-02-06 PROCEDURE — 94761 N-INVAS EAR/PLS OXIMETRY MLT: CPT

## 2024-02-06 PROCEDURE — 71046 X-RAY EXAM CHEST 2 VIEWS: CPT

## 2024-02-06 PROCEDURE — 99223 1ST HOSP IP/OBS HIGH 75: CPT | Performed by: PHYSICAL MEDICINE & REHABILITATION

## 2024-02-06 PROCEDURE — 97116 GAIT TRAINING THERAPY: CPT

## 2024-02-06 PROCEDURE — 84100 ASSAY OF PHOSPHORUS: CPT

## 2024-02-06 PROCEDURE — 82962 GLUCOSE BLOOD TEST: CPT

## 2024-02-06 PROCEDURE — 80048 BASIC METABOLIC PNL TOTAL CA: CPT

## 2024-02-06 PROCEDURE — 83735 ASSAY OF MAGNESIUM: CPT

## 2024-02-06 PROCEDURE — 6370000000 HC RX 637 (ALT 250 FOR IP): Performed by: INTERNAL MEDICINE

## 2024-02-06 PROCEDURE — 1280000000 HC REHAB R&B

## 2024-02-06 PROCEDURE — 6370000000 HC RX 637 (ALT 250 FOR IP): Performed by: PHYSICAL MEDICINE & REHABILITATION

## 2024-02-06 RX ORDER — ENOXAPARIN SODIUM 100 MG/ML
30 INJECTION SUBCUTANEOUS 2 TIMES DAILY
Status: DISCONTINUED | OUTPATIENT
Start: 2024-02-06 | End: 2024-02-13 | Stop reason: HOSPADM

## 2024-02-06 RX ORDER — POTASSIUM CHLORIDE 7.45 MG/ML
10 INJECTION INTRAVENOUS PRN
Status: DISCONTINUED | OUTPATIENT
Start: 2024-02-06 | End: 2024-02-06

## 2024-02-06 RX ORDER — HYDRALAZINE HYDROCHLORIDE 20 MG/ML
10 INJECTION INTRAMUSCULAR; INTRAVENOUS EVERY 6 HOURS PRN
Status: DISCONTINUED | OUTPATIENT
Start: 2024-02-06 | End: 2024-02-06

## 2024-02-06 RX ORDER — ONDANSETRON 4 MG/1
4 TABLET, ORALLY DISINTEGRATING ORAL EVERY 8 HOURS PRN
Status: CANCELLED | OUTPATIENT
Start: 2024-02-06

## 2024-02-06 RX ORDER — TRAZODONE HYDROCHLORIDE 50 MG/1
50 TABLET ORAL NIGHTLY PRN
Status: CANCELLED | OUTPATIENT
Start: 2024-02-06

## 2024-02-06 RX ORDER — POLYETHYLENE GLYCOL 3350 17 G/17G
17 POWDER, FOR SOLUTION ORAL DAILY
Status: CANCELLED | OUTPATIENT
Start: 2024-02-07

## 2024-02-06 RX ORDER — INSULIN LISPRO 100 [IU]/ML
0-16 INJECTION, SOLUTION INTRAVENOUS; SUBCUTANEOUS
Status: CANCELLED | OUTPATIENT
Start: 2024-02-06

## 2024-02-06 RX ORDER — ONDANSETRON 4 MG/1
4 TABLET, ORALLY DISINTEGRATING ORAL EVERY 8 HOURS PRN
Status: DISCONTINUED | OUTPATIENT
Start: 2024-02-06 | End: 2024-02-13 | Stop reason: HOSPADM

## 2024-02-06 RX ORDER — GABAPENTIN 300 MG/1
300 CAPSULE ORAL 3 TIMES DAILY
Status: CANCELLED | OUTPATIENT
Start: 2024-02-06

## 2024-02-06 RX ORDER — DEXTROSE MONOHYDRATE 100 MG/ML
INJECTION, SOLUTION INTRAVENOUS CONTINUOUS PRN
Status: CANCELLED | OUTPATIENT
Start: 2024-02-06

## 2024-02-06 RX ORDER — ENALAPRIL MALEATE 5 MG/1
5 TABLET ORAL DAILY
Status: CANCELLED | OUTPATIENT
Start: 2024-02-07

## 2024-02-06 RX ORDER — SODIUM CHLORIDE 0.9 % (FLUSH) 0.9 %
5-40 SYRINGE (ML) INJECTION PRN
Status: DISCONTINUED | OUTPATIENT
Start: 2024-02-06 | End: 2024-02-06

## 2024-02-06 RX ORDER — GABAPENTIN 300 MG/1
300 CAPSULE ORAL 3 TIMES DAILY
Status: DISCONTINUED | OUTPATIENT
Start: 2024-02-06 | End: 2024-02-13 | Stop reason: HOSPADM

## 2024-02-06 RX ORDER — POTASSIUM CHLORIDE 20 MEQ/1
10 TABLET, EXTENDED RELEASE ORAL
Status: CANCELLED | OUTPATIENT
Start: 2024-02-07

## 2024-02-06 RX ORDER — ATORVASTATIN CALCIUM 40 MG/1
40 TABLET, FILM COATED ORAL NIGHTLY
Status: DISCONTINUED | OUTPATIENT
Start: 2024-02-06 | End: 2024-02-13 | Stop reason: HOSPADM

## 2024-02-06 RX ORDER — M-VIT,TX,IRON,MINS/CALC/FOLIC 27MG-0.4MG
1 TABLET ORAL
Status: CANCELLED | OUTPATIENT
Start: 2024-02-07

## 2024-02-06 RX ORDER — SODIUM CHLORIDE 0.9 % (FLUSH) 0.9 %
5-40 SYRINGE (ML) INJECTION PRN
Status: CANCELLED | OUTPATIENT
Start: 2024-02-06

## 2024-02-06 RX ORDER — FAMOTIDINE 20 MG/1
20 TABLET, FILM COATED ORAL 2 TIMES DAILY
Status: CANCELLED | OUTPATIENT
Start: 2024-02-06

## 2024-02-06 RX ORDER — ACETAMINOPHEN 325 MG/1
650 TABLET ORAL
Status: DISCONTINUED | OUTPATIENT
Start: 2024-02-06 | End: 2024-02-13 | Stop reason: HOSPADM

## 2024-02-06 RX ORDER — SODIUM CHLORIDE 0.9 % (FLUSH) 0.9 %
5-40 SYRINGE (ML) INJECTION EVERY 12 HOURS SCHEDULED
Status: DISCONTINUED | OUTPATIENT
Start: 2024-02-06 | End: 2024-02-13 | Stop reason: HOSPADM

## 2024-02-06 RX ORDER — INSULIN GLARGINE 100 [IU]/ML
14 INJECTION, SOLUTION SUBCUTANEOUS 2 TIMES DAILY
Status: DISCONTINUED | OUTPATIENT
Start: 2024-02-06 | End: 2024-02-11

## 2024-02-06 RX ORDER — BISACODYL 10 MG
10 SUPPOSITORY, RECTAL RECTAL DAILY PRN
Status: DISCONTINUED | OUTPATIENT
Start: 2024-02-06 | End: 2024-02-13 | Stop reason: HOSPADM

## 2024-02-06 RX ORDER — GLUCAGON 1 MG/ML
1 KIT INJECTION PRN
Status: CANCELLED | OUTPATIENT
Start: 2024-02-06

## 2024-02-06 RX ORDER — HYDRALAZINE HYDROCHLORIDE 20 MG/ML
10 INJECTION INTRAMUSCULAR; INTRAVENOUS EVERY 6 HOURS PRN
Status: CANCELLED | OUTPATIENT
Start: 2024-02-06

## 2024-02-06 RX ORDER — ASPIRIN 81 MG/1
81 TABLET, CHEWABLE ORAL DAILY
Status: CANCELLED | OUTPATIENT
Start: 2024-02-07

## 2024-02-06 RX ORDER — MAGNESIUM SULFATE IN WATER 40 MG/ML
2000 INJECTION, SOLUTION INTRAVENOUS PRN
Status: DISCONTINUED | OUTPATIENT
Start: 2024-02-06 | End: 2024-02-06

## 2024-02-06 RX ORDER — CLOPIDOGREL BISULFATE 75 MG/1
75 TABLET ORAL DAILY
Status: DISCONTINUED | OUTPATIENT
Start: 2024-02-07 | End: 2024-02-13 | Stop reason: HOSPADM

## 2024-02-06 RX ORDER — CHLORHEXIDINE GLUCONATE ORAL RINSE 1.2 MG/ML
15 SOLUTION DENTAL 2 TIMES DAILY
Status: CANCELLED | OUTPATIENT
Start: 2024-02-06

## 2024-02-06 RX ORDER — POTASSIUM CHLORIDE 20 MEQ/1
40 TABLET, EXTENDED RELEASE ORAL PRN
Status: CANCELLED | OUTPATIENT
Start: 2024-02-06

## 2024-02-06 RX ORDER — ONDANSETRON 2 MG/ML
4 INJECTION INTRAMUSCULAR; INTRAVENOUS EVERY 6 HOURS PRN
Status: DISCONTINUED | OUTPATIENT
Start: 2024-02-06 | End: 2024-02-13 | Stop reason: HOSPADM

## 2024-02-06 RX ORDER — MAGNESIUM SULFATE IN WATER 40 MG/ML
2000 INJECTION, SOLUTION INTRAVENOUS PRN
Status: CANCELLED | OUTPATIENT
Start: 2024-02-06

## 2024-02-06 RX ORDER — FAMOTIDINE 10 MG/ML
20 INJECTION, SOLUTION INTRAVENOUS 2 TIMES DAILY
Status: CANCELLED | OUTPATIENT
Start: 2024-02-06

## 2024-02-06 RX ORDER — CLOPIDOGREL BISULFATE 75 MG/1
75 TABLET ORAL DAILY
Status: CANCELLED | OUTPATIENT
Start: 2024-02-07

## 2024-02-06 RX ORDER — ALBUMIN, HUMAN INJ 5% 5 %
12.5 SOLUTION INTRAVENOUS PRN
Status: CANCELLED | OUTPATIENT
Start: 2024-02-06

## 2024-02-06 RX ORDER — GLUCAGON 1 MG/ML
1 KIT INJECTION PRN
Status: DISCONTINUED | OUTPATIENT
Start: 2024-02-06 | End: 2024-02-13 | Stop reason: HOSPADM

## 2024-02-06 RX ORDER — GINSENG 100 MG
CAPSULE ORAL 2 TIMES DAILY
Status: CANCELLED | OUTPATIENT
Start: 2024-02-06

## 2024-02-06 RX ORDER — ASPIRIN 81 MG/1
81 TABLET, CHEWABLE ORAL DAILY
Status: DISCONTINUED | OUTPATIENT
Start: 2024-02-07 | End: 2024-02-13 | Stop reason: HOSPADM

## 2024-02-06 RX ORDER — BISACODYL 10 MG
10 SUPPOSITORY, RECTAL RECTAL DAILY PRN
Status: CANCELLED | OUTPATIENT
Start: 2024-02-06

## 2024-02-06 RX ORDER — POTASSIUM CHLORIDE 20 MEQ/1
10 TABLET, EXTENDED RELEASE ORAL
Status: DISCONTINUED | OUTPATIENT
Start: 2024-02-07 | End: 2024-02-13 | Stop reason: HOSPADM

## 2024-02-06 RX ORDER — ACETAMINOPHEN 500 MG
1000 TABLET ORAL EVERY 8 HOURS SCHEDULED
Status: CANCELLED | OUTPATIENT
Start: 2024-02-06

## 2024-02-06 RX ORDER — SENNA AND DOCUSATE SODIUM 50; 8.6 MG/1; MG/1
1 TABLET, FILM COATED ORAL 2 TIMES DAILY
Status: CANCELLED | OUTPATIENT
Start: 2024-02-06

## 2024-02-06 RX ORDER — FUROSEMIDE 10 MG/ML
20 INJECTION INTRAMUSCULAR; INTRAVENOUS DAILY
Status: DISCONTINUED | OUTPATIENT
Start: 2024-02-07 | End: 2024-02-11

## 2024-02-06 RX ORDER — INSULIN GLARGINE 100 [IU]/ML
14 INJECTION, SOLUTION SUBCUTANEOUS 2 TIMES DAILY
Status: CANCELLED | OUTPATIENT
Start: 2024-02-06

## 2024-02-06 RX ORDER — ONDANSETRON 2 MG/ML
4 INJECTION INTRAMUSCULAR; INTRAVENOUS EVERY 6 HOURS PRN
Status: CANCELLED | OUTPATIENT
Start: 2024-02-06

## 2024-02-06 RX ORDER — IPRATROPIUM BROMIDE AND ALBUTEROL SULFATE 2.5; .5 MG/3ML; MG/3ML
1 SOLUTION RESPIRATORY (INHALATION) EVERY 4 HOURS PRN
Status: CANCELLED | OUTPATIENT
Start: 2024-02-06

## 2024-02-06 RX ORDER — ATORVASTATIN CALCIUM 40 MG/1
40 TABLET, FILM COATED ORAL NIGHTLY
Status: CANCELLED | OUTPATIENT
Start: 2024-02-06

## 2024-02-06 RX ORDER — POTASSIUM CHLORIDE 20 MEQ/1
40 TABLET, EXTENDED RELEASE ORAL PRN
Status: DISCONTINUED | OUTPATIENT
Start: 2024-02-06 | End: 2024-02-06

## 2024-02-06 RX ORDER — POLYETHYLENE GLYCOL 3350 17 G/17G
17 POWDER, FOR SOLUTION ORAL DAILY
Status: DISCONTINUED | OUTPATIENT
Start: 2024-02-07 | End: 2024-02-13 | Stop reason: HOSPADM

## 2024-02-06 RX ORDER — SODIUM CHLORIDE 0.9 % (FLUSH) 0.9 %
5-40 SYRINGE (ML) INJECTION EVERY 12 HOURS SCHEDULED
Status: CANCELLED | OUTPATIENT
Start: 2024-02-06

## 2024-02-06 RX ORDER — ACETAMINOPHEN 500 MG
1000 TABLET ORAL EVERY 8 HOURS SCHEDULED
Status: DISCONTINUED | OUTPATIENT
Start: 2024-02-06 | End: 2024-02-06

## 2024-02-06 RX ORDER — POTASSIUM CHLORIDE 29.8 MG/ML
20 INJECTION INTRAVENOUS PRN
Status: DISCONTINUED | OUTPATIENT
Start: 2024-02-06 | End: 2024-02-06

## 2024-02-06 RX ORDER — POTASSIUM CHLORIDE 7.45 MG/ML
10 INJECTION INTRAVENOUS PRN
Status: CANCELLED | OUTPATIENT
Start: 2024-02-06

## 2024-02-06 RX ORDER — FAMOTIDINE 20 MG/1
20 TABLET, FILM COATED ORAL 2 TIMES DAILY
Status: DISCONTINUED | OUTPATIENT
Start: 2024-02-06 | End: 2024-02-13 | Stop reason: HOSPADM

## 2024-02-06 RX ORDER — FUROSEMIDE 10 MG/ML
20 INJECTION INTRAMUSCULAR; INTRAVENOUS DAILY
Status: CANCELLED | OUTPATIENT
Start: 2024-02-07

## 2024-02-06 RX ORDER — FAMOTIDINE 10 MG/ML
20 INJECTION, SOLUTION INTRAVENOUS 2 TIMES DAILY
Status: DISCONTINUED | OUTPATIENT
Start: 2024-02-06 | End: 2024-02-06

## 2024-02-06 RX ORDER — SENNA AND DOCUSATE SODIUM 50; 8.6 MG/1; MG/1
1 TABLET, FILM COATED ORAL 2 TIMES DAILY
Status: DISCONTINUED | OUTPATIENT
Start: 2024-02-06 | End: 2024-02-13 | Stop reason: HOSPADM

## 2024-02-06 RX ORDER — SODIUM CHLORIDE 9 MG/ML
INJECTION, SOLUTION INTRAVENOUS PRN
Status: DISCONTINUED | OUTPATIENT
Start: 2024-02-06 | End: 2024-02-13 | Stop reason: HOSPADM

## 2024-02-06 RX ORDER — IPRATROPIUM BROMIDE AND ALBUTEROL SULFATE 2.5; .5 MG/3ML; MG/3ML
1 SOLUTION RESPIRATORY (INHALATION) EVERY 4 HOURS PRN
Status: DISCONTINUED | OUTPATIENT
Start: 2024-02-06 | End: 2024-02-13 | Stop reason: HOSPADM

## 2024-02-06 RX ORDER — GINSENG 100 MG
CAPSULE ORAL 2 TIMES DAILY
Status: DISCONTINUED | OUTPATIENT
Start: 2024-02-06 | End: 2024-02-13 | Stop reason: HOSPADM

## 2024-02-06 RX ORDER — POTASSIUM CHLORIDE 29.8 MG/ML
20 INJECTION INTRAVENOUS PRN
Status: CANCELLED | OUTPATIENT
Start: 2024-02-06

## 2024-02-06 RX ORDER — M-VIT,TX,IRON,MINS/CALC/FOLIC 27MG-0.4MG
1 TABLET ORAL
Status: DISCONTINUED | OUTPATIENT
Start: 2024-02-07 | End: 2024-02-13 | Stop reason: HOSPADM

## 2024-02-06 RX ORDER — CHLORHEXIDINE GLUCONATE ORAL RINSE 1.2 MG/ML
15 SOLUTION DENTAL 2 TIMES DAILY
Status: DISCONTINUED | OUTPATIENT
Start: 2024-02-06 | End: 2024-02-06

## 2024-02-06 RX ORDER — ENOXAPARIN SODIUM 100 MG/ML
30 INJECTION SUBCUTANEOUS 2 TIMES DAILY
Status: CANCELLED | OUTPATIENT
Start: 2024-02-06

## 2024-02-06 RX ORDER — ENALAPRIL MALEATE 5 MG/1
5 TABLET ORAL DAILY
Status: DISCONTINUED | OUTPATIENT
Start: 2024-02-07 | End: 2024-02-13 | Stop reason: HOSPADM

## 2024-02-06 RX ORDER — SODIUM CHLORIDE 9 MG/ML
INJECTION, SOLUTION INTRAVENOUS PRN
Status: CANCELLED | OUTPATIENT
Start: 2024-02-06

## 2024-02-06 RX ORDER — BISACODYL 10 MG
10 SUPPOSITORY, RECTAL RECTAL DAILY PRN
Status: DISCONTINUED | OUTPATIENT
Start: 2024-02-06 | End: 2024-02-06 | Stop reason: SDUPTHER

## 2024-02-06 RX ORDER — DEXTROSE MONOHYDRATE 100 MG/ML
INJECTION, SOLUTION INTRAVENOUS CONTINUOUS PRN
Status: DISCONTINUED | OUTPATIENT
Start: 2024-02-06 | End: 2024-02-13 | Stop reason: HOSPADM

## 2024-02-06 RX ORDER — TRAZODONE HYDROCHLORIDE 50 MG/1
50 TABLET ORAL NIGHTLY PRN
Status: DISCONTINUED | OUTPATIENT
Start: 2024-02-06 | End: 2024-02-13 | Stop reason: HOSPADM

## 2024-02-06 RX ORDER — INSULIN LISPRO 100 [IU]/ML
0-16 INJECTION, SOLUTION INTRAVENOUS; SUBCUTANEOUS
Status: DISCONTINUED | OUTPATIENT
Start: 2024-02-06 | End: 2024-02-11

## 2024-02-06 RX ORDER — ALBUMIN, HUMAN INJ 5% 5 %
12.5 SOLUTION INTRAVENOUS PRN
Status: DISCONTINUED | OUTPATIENT
Start: 2024-02-06 | End: 2024-02-06

## 2024-02-06 RX ADMIN — POLYETHYLENE GLYCOL (3350) 17 G: 17 POWDER, FOR SOLUTION ORAL at 09:55

## 2024-02-06 RX ADMIN — SENNOSIDES, DOCUSATE SODIUM 1 TABLET: 8.6; 5 TABLET ORAL at 22:17

## 2024-02-06 RX ADMIN — INSULIN LISPRO 4 UNITS: 100 INJECTION, SOLUTION INTRAVENOUS; SUBCUTANEOUS at 22:21

## 2024-02-06 RX ADMIN — FAMOTIDINE 20 MG: 20 TABLET ORAL at 22:18

## 2024-02-06 RX ADMIN — GABAPENTIN 300 MG: 300 CAPSULE ORAL at 22:17

## 2024-02-06 RX ADMIN — Medication 1 TABLET: at 09:55

## 2024-02-06 RX ADMIN — FAMOTIDINE 20 MG: 20 TABLET ORAL at 09:56

## 2024-02-06 RX ADMIN — ENALAPRIL MALEATE 5 MG: 5 TABLET ORAL at 09:55

## 2024-02-06 RX ADMIN — SERTRALINE HYDROCHLORIDE 100 MG: 50 TABLET ORAL at 09:55

## 2024-02-06 RX ADMIN — GABAPENTIN 300 MG: 300 CAPSULE ORAL at 09:55

## 2024-02-06 RX ADMIN — TRAZODONE HYDROCHLORIDE 50 MG: 50 TABLET ORAL at 22:17

## 2024-02-06 RX ADMIN — ACETAMINOPHEN 1000 MG: 500 TABLET ORAL at 13:12

## 2024-02-06 RX ADMIN — INSULIN GLARGINE 14 UNITS: 100 INJECTION, SOLUTION SUBCUTANEOUS at 11:10

## 2024-02-06 RX ADMIN — METOPROLOL TARTRATE 37.5 MG: 25 TABLET, FILM COATED ORAL at 22:15

## 2024-02-06 RX ADMIN — BACITRACIN: 500 OINTMENT TOPICAL at 23:52

## 2024-02-06 RX ADMIN — SODIUM CHLORIDE, PRESERVATIVE FREE 10 ML: 5 INJECTION INTRAVENOUS at 10:03

## 2024-02-06 RX ADMIN — SENNOSIDES, DOCUSATE SODIUM 1 TABLET: 8.6; 5 TABLET ORAL at 09:55

## 2024-02-06 RX ADMIN — ASPIRIN 81 MG 81 MG: 81 TABLET ORAL at 09:56

## 2024-02-06 RX ADMIN — METOPROLOL TARTRATE 37.5 MG: 25 TABLET, FILM COATED ORAL at 09:54

## 2024-02-06 RX ADMIN — ATORVASTATIN CALCIUM 40 MG: 40 TABLET, FILM COATED ORAL at 22:16

## 2024-02-06 RX ADMIN — CHLORHEXIDINE GLUCONATE 0.12% ORAL RINSE 15 ML: 1.2 LIQUID ORAL at 09:54

## 2024-02-06 RX ADMIN — BACITRACIN: 500 OINTMENT TOPICAL at 11:12

## 2024-02-06 RX ADMIN — FUROSEMIDE 20 MG: 10 INJECTION, SOLUTION INTRAMUSCULAR; INTRAVENOUS at 10:02

## 2024-02-06 RX ADMIN — SODIUM CHLORIDE, PRESERVATIVE FREE 10 ML: 5 INJECTION INTRAVENOUS at 23:50

## 2024-02-06 RX ADMIN — INSULIN GLARGINE 14 UNITS: 100 INJECTION, SOLUTION SUBCUTANEOUS at 22:21

## 2024-02-06 RX ADMIN — GABAPENTIN 300 MG: 300 CAPSULE ORAL at 13:13

## 2024-02-06 RX ADMIN — ACETAMINOPHEN 650 MG: 325 TABLET ORAL at 22:18

## 2024-02-06 RX ADMIN — CLOPIDOGREL BISULFATE 75 MG: 75 TABLET ORAL at 09:55

## 2024-02-06 RX ADMIN — ENOXAPARIN SODIUM 30 MG: 100 INJECTION SUBCUTANEOUS at 22:21

## 2024-02-06 RX ADMIN — POTASSIUM CHLORIDE 10 MEQ: 1500 TABLET, EXTENDED RELEASE ORAL at 09:54

## 2024-02-06 RX ADMIN — ACETAMINOPHEN 1000 MG: 500 TABLET ORAL at 06:43

## 2024-02-06 RX ADMIN — ENOXAPARIN SODIUM 30 MG: 100 INJECTION SUBCUTANEOUS at 10:02

## 2024-02-06 ASSESSMENT — PAIN SCALES - GENERAL: PAINLEVEL_OUTOF10: 6

## 2024-02-06 ASSESSMENT — PAIN DESCRIPTION - LOCATION: LOCATION: HEAD

## 2024-02-06 ASSESSMENT — PAIN DESCRIPTION - ONSET: ONSET: ON-GOING

## 2024-02-06 ASSESSMENT — PAIN DESCRIPTION - PAIN TYPE: TYPE: ACUTE PAIN

## 2024-02-06 ASSESSMENT — PAIN DESCRIPTION - FREQUENCY: FREQUENCY: CONTINUOUS

## 2024-02-06 ASSESSMENT — PAIN - FUNCTIONAL ASSESSMENT: PAIN_FUNCTIONAL_ASSESSMENT: ACTIVITIES ARE NOT PREVENTED

## 2024-02-06 ASSESSMENT — PAIN DESCRIPTION - DESCRIPTORS: DESCRIPTORS: ACHING

## 2024-02-06 NOTE — DISCHARGE SUMMARY
Cardiothoracic and Vascular Surgery Discharge Summary  Today's Date: 24  Name:  Jose Figueroa /Age/Sex: 1965  (58 y.o. male)   MRN & CSN:  [id number] & [id number] Admission Date/Time: 2024  9:54 AM   Location:  -A PCP: Arabella Boles MD       Admit date: 2024   Discharge date: 2024      Admitting Provider: Kaden Trejo MD   Discharge Provider: Zeina Hu PA-C     Discharge Diagnoses:   Active Hospital Problems    Diagnosis Date Noted    Postsurgical fever [R50.82] 2024    Triple vessel coronary artery disease [I25.10] 2024    S/P 2-vessel coronary artery bypass [Z95.1] 2024     Discharged Condition: stable.    Operation    S/p CORONARY ARTERY BYPASS GRAFT X2, LIMA - LAD, SVG - PDA; LEFT ENDOSCOPIC GREATER SAPHENOUS VEIN HARVEST; INTRAOPERATIVE TRANSESOPHAGEAL ECHOCARDIOGRAM  on 24.       Hospital Course     24 extubated to NC overnight. Wean dheeraj then epi for goal MAP>65. 500cc albumin 5%. Keep chest tubes and central line.    24 Increase metoprolol to 25mg PO BID. Lasix 40mg + K 20meq IV BID today. Extra 40 IV lasix this morning for total of 80 mg. Discontinue milrinone   24 Lasix 40mg IV daily. ID consult for antibiotic recommendations. Panculture. remove chest tubes   24: Lasix 40IV daily. ASA and Plavix. Increase Metoprolol to 37.5mg PO BID     2/3/24: Continue ASA/Plavix/Lasix/Metoprolol. Medically ready for DC.  24: Remains on Lasix 40 mg IV daily.  Medically ready for discharge.  Awaiting insurance approval.  24: Lasix decreased to 20 mg daily.  Transcutaneous pacing wires removed. Medically ready for discharge.  Awaiting insurance approval.  24: Precertification for ARU was complete.  Patient is going to be discharged to rehab.    PT/OT evaluated patient and is recommending be discharged to Rehab      Consults: Case management, cardiology, dietitian, physical therapy, Occupational Therapy, infectious disease,  sudden increase in weight can be a sign of a problem with your heart. Tell your doctor if you suddenly gain weight, such as 3 pounds or more in 2 to 3 days.     Do not smoke. Smoking can make it harder for you to recover. And it will raise the chances of your arteries narrowing again. If you need help quitting, talk to your doctor about stop-smoking programs and medicines. These can increase your chances of quitting for good.   Follow-up care is a key part of your treatment and safety. Be sure to make and go to all appointments, and call your doctor if you are having problems. It's also a good idea to know your test results and keep a list of the medicines you take.  When should you call for help?   Call 911 anytime you think you may need emergency care. For example, call if:    You passed out (lost consciousness).     You have severe trouble breathing.     You have sudden chest pain and shortness of breath, or you cough up blood.     You have severe pain in your chest.     You have symptoms of a heart attack. These may include:  Chest pain or pressure, or a strange feeling in the chest.  Sweating.  Shortness of breath.  Nausea or vomiting.  Pain, pressure, or a strange feeling in the back, neck, jaw, or upper belly or in one or both shoulders or arms.  Lightheadedness or sudden weakness.  A fast or irregular heartbeat.  After you call 911, the  may tell you to chew 1 adult-strength or 2 to 4 low-dose aspirin. Wait for an ambulance. Do not try to drive yourself.     You have angina symptoms (such as chest pain or pressure) that do not go away with rest or are not getting better within 5 minutes after you take a dose of nitroglycerin.     You have symptoms of a stroke. These may include:  Sudden numbness, tingling, weakness, or loss of movement in your face, arm, or leg, especially on only one side of your body.  Sudden vision changes.  Sudden trouble speaking.  Sudden confusion or trouble understanding

## 2024-02-06 NOTE — PROGRESS NOTES
doom  Tripod position  Confusion/altered mental status/obtunded  Options provided:  -- Postoperative Respiratory failure is due to the procedure  -- Post op acute Respiratory Failure as evidenced by, Please document evidence   and cause.  -- Mechanical ventilation support after procedure without respiratory failure  -- Acute Respiratory Failure ruled out after study  -- Other - I will add my own diagnosis  -- Disagree - Not applicable / Not valid  -- Disagree - Clinically unable to determine / Unknown  -- Refer to Clinical Documentation Reviewer    PROVIDER RESPONSE TEXT:    Mechanical ventilation support after procedure without respiratory failure.    Query created by: Liz Abreu on 1/31/2024 9:11 AM      Electronically signed by:  EDGAR MONTGOMERY 2/6/2024 12:37 PM

## 2024-02-06 NOTE — DISCHARGE INSTR - COC
Continuity of Care Form    Patient Name: Jose Figueroa   :  1965  MRN:  [id number]    Admit date:  2024  Discharge date:  2024    Code Status Order: Full Code   Advance Directives:   Advance Care Flowsheet Documentation       Date/Time Healthcare Directive Type of Healthcare Directive Copy in Chart Healthcare Agent Appointed Healthcare Agent's Name Healthcare Agent's Phone Number    24 1041 No, patient does not have an advance directive for healthcare treatment -- -- -- -- --            Admitting Physician:  Kaden Trejo MD  PCP: Arabella Boles MD    Discharging Nurse: Rachele Bernabe RN  Discharging Hospital Unit/Room#:   Discharging Unit Phone Number: [phone]    Emergency Contact:   Extended Emergency Contact Information  Primary Emergency Contact: RejiMary garciaSharon  Home Phone: [phone]  Mobile Phone: [phone]  Relation: Brother/Sister  Secondary Emergency Contact: Wendy Arauz, [facility]  Home Phone: [phone]  Relation: Friend    Past Surgical History:  Past Surgical History:   Procedure Laterality Date    BRAIN SURGERY      CARDIAC PROCEDURE N/A 2024    Left heart cath / coronary angiography performed by Basil Genao MD at [facility] CARDIAC CATH LAB    CORONARY ARTERY BYPASS GRAFT N/A 2024    CORONARY ARTERY BYPASS GRAFT X2, LIMA - LAD, SVG - PDA; LEFT ENDOSCOPIC GREATER SAPHENOUS VEIN HARVEST; INTRAOPERATIVE TRANSESOPHAGEAL ECHOCARDIOGRAM performed by Kaden Trejo MD at [facility] OR    INVASIVE VASCULAR N/A 2024    Angiography renal bilat performed by Basil Genao MD at [facility] CARDIAC CATH LAB       Immunization History:     There is no immunization history on file for this patient.    Active Problems:  Patient Active Problem List   Diagnosis Code    LVH (left ventricular hypertrophy) I51.7    SOB (shortness of breath) R06.02    Cerebrovascular accident (CVA) due to stenosis of precerebral artery (HCC)  (Comment) 24 0840   Incision Cleansed Soap and water 24 1200   Dressing/Treatment Open to air;Pharmaceutical agent (see MAR) 24 08   Closure Sutures 24 0840   Margins Approximated 24 08   Incision Assessment Dry 24 0840   Drainage Amount None (dry) 24 0840   Odor None 24 0840   Martha-incision Assessment Intact 24 0840   Number of days: 8        Elimination:  Continence:   Bowel: {YES / NO:}  Bladder: {YES / NO:}  Urinary Catheter: {Urinary Catheter:513733298}   Colostomy/Ileostomy/Ileal Conduit: {YES / NO:}       Date of Last BM: ***    Intake/Output Summary (Last 24 hours) at 2024 1441  Last data filed at 2024 1312  Gross per 24 hour   Intake --   Output 4425 ml   Net -4425 ml     I/O last 3 completed shifts:  In: -   Out: 5375 [Urine:5375]    Safety Concerns:     { PEARL Safety Concerns:931571796}    Impairments/Disabilities:      { PEARL Impairments/Disabilities:646147857}    Nutrition Therapy:  Current Nutrition Therapy:   { PEARL Diet List:873840315}    Routes of Feeding: {[facility] Other Feedings:657396134}  Liquids: {Slp liquid thickness:04846}  Daily Fluid Restriction: {[facility] DME Yes amt example:080265377}  Last Modified Barium Swallow with Video (Video Swallowing Test): {Done Not Done Date:}    Treatments at the Time of Hospital Discharge:   Respiratory Treatments: ***  Oxygen Therapy:  {Therapy; copd oxygen:36167}  Ventilator:    { CC Vent List:776032321}    Rehab Therapies: {THERAPEUTIC INTERVENTION:0170908563}  Weight Bearing Status/Restrictions: {[facility] Weight Bearin}  Other Medical Equipment (for information only, NOT a DME order):  {EQUIPMENT:303940965}  Other Treatments: ***    Patient's personal belongings (please select all that are sent with patient):  {[facility] DME Belongings:258581405}    RN SIGNATURE:  {Esignature:195173314}    CASE MANAGEMENT/SOCIAL WORK SECTION    Inpatient Status Date:

## 2024-02-06 NOTE — DISCHARGE INSTRUCTIONS
Cardiac Surgery: What to Expect at Home  Your Recovery     CABG  Coronary artery bypass graft (CABG) is surgery to treat coronary artery disease. The surgery helps blood make a detour, or bypass, around one or more narrowed or blocked coronary arteries. Coronary arteries are the blood vessels that bring blood to the heart muscle. Your doctor did the surgery through a cut, called an incision, in your chest.  A bypass graft was created using a piece of blood vessel from another part of your body (likely your leg). Your doctor will attach, or graft, this blood vessel above and below the narrowed or blocked section of your coronary artery.  Heart Valve Surgery  Heart valve surgery repairs or replaces a damaged heart valve. There are four valves in your heart. They are the mitral, aortic, tricuspid, and pulmonary valves. These valves open and close to keep blood flowing in the proper direction through your heart. When you have a problem with a heart valve, blood does not flow through the heart the right way.  During your surgery, your doctor either repaired your heart valve or replaced it. The replacement valve may be made of metal or of animal tissue. If you have questions regarding which type of valve you have received, please ask your doctor.  Recovery from Cardiac Surgery  You will feel tired and sore for the first few weeks after surgery. You may have some brief, sharp pains on either side of your chest. Your chest, shoulders, and upper back may ache. These symptoms usually get better after 4 to 8 weeks. The incision in your chest may be sore or swollen. If you had a CABG, the area where the healthy blood vessel was taken may be sore or swollen as well.  You will probably be able to do many of your usual activities after 8 weeks. But for at least 8 weeks, avoid lifting heavy objects and doing activities that strain your chest or upper arm muscles. At first you may notice that you get tired easily and need to  your weight. Weigh yourself every day at the same time of day, on the same scale, in the same amount of clothing. A sudden increase in weight can be a sign of a problem with your heart. Tell your doctor if you suddenly gain weight, such as 3 pounds or more in 2 to 3 days.     Do not smoke. Smoking can make it harder for you to recover. And it will raise the chances of your arteries narrowing again. If you need help quitting, talk to your doctor about stop-smoking programs and medicines. These can increase your chances of quitting for good.   Follow-up care is a key part of your treatment and safety. Be sure to make and go to all appointments, and call your doctor if you are having problems. It's also a good idea to know your test results and keep a list of the medicines you take.  When should you call for help?   Call 911 anytime you think you may need emergency care. For example, call if:    You passed out (lost consciousness).     You have severe trouble breathing.     You have sudden chest pain and shortness of breath, or you cough up blood.     You have severe pain in your chest.     You have symptoms of a heart attack. These may include:  Chest pain or pressure, or a strange feeling in the chest.  Sweating.  Shortness of breath.  Nausea or vomiting.  Pain, pressure, or a strange feeling in the back, neck, jaw, or upper belly or in one or both shoulders or arms.  Lightheadedness or sudden weakness.  A fast or irregular heartbeat.  After you call 911, the  may tell you to chew 1 adult-strength or 2 to 4 low-dose aspirin. Wait for an ambulance. Do not try to drive yourself.     You have angina symptoms (such as chest pain or pressure) that do not go away with rest or are not getting better within 5 minutes after you take a dose of nitroglycerin.     You have symptoms of a stroke. These may include:  Sudden numbness, tingling, weakness, or loss of movement in your face, arm, or leg, especially on only one

## 2024-02-06 NOTE — CARE COORDINATION
Spoke to Ritika VELASQUEZ. If ARU precert fails- family wants [facility] or [place]. Kimberly Cabrera RN     1100 Discussed in IDR. ARU received precert and can admit today. Kimberly Cabrera RN     8908 Spoke to Saige JUNE TL; updated on ARU approval. Kimberly Cabrera RN

## 2024-02-06 NOTE — PROGRESS NOTES
1020 Pt voided in bedside commode and still feels like to go, so bladder scanned and it was 0    1110 pt complained of both leg pain during the PT .  Informed DORIAN Mccartney about that 1059 - report given to [place] at [employer]. Pt expects ride to come by son-in-law late this afternoon. He gets off work at [address] [place] [address] made aware. 4 Eyes Skin Assessment     NAME:  Marietta Rivas  YOB: 1949  MEDICAL RECORD NUMBER:  [id number]    The patient is being assessed for  Admission    I agree that at least one RN has performed a thorough Head to Toe Skin Assessment on the patient. ALL assessment sites listed below have been assessed. Areas assessed by both nurses:    Head, Face, Ears, Shoulders, Back, Chest, Arms, Elbows, Hands, Sacrum. Buttock, Coccyx, Ischium, and Legs. Feet and Heels        Does the Patient have a Wound? Yes wound(s) were present on assessment.  LDA wound assessment was Initiated and completed by RN       Noman Prevention initiated by RN: Yes  Wound Care Orders initiated by RN: Yes    Pressure Injury (Stage 3,4, Unstageable, DTI, NWPT, and Complex wounds) if present, place Wound referral order by RN under : Yes    New Ostomies, if present place, Ostomy referral order under : No     Nurse 1 eSignature: Electronically signed by uLpillo Sampson RN on 10/5/23 at 6:56 AM EDT    **SHARE this note so that the co-signing nurse can place an eSignature**    Nurse 2 eSignature: Electronically signed by Mala Chavez RN on 10/5/23 at 6:57 AM EDT ARU Admission Report    [x] Approved in Relay (Time entered ***)  [x] Printed 2 copies of PAS  [x] Bed has been reserved  [x] Dr Rendon has been Notified of Patient Admission   [x] Signed and Held Orders are in chart  [x] Hospitalist has completed discharge order    Room transferring from [facility]: -A           Room assigned on ARU:  1013  Report received from : ***  2024      1:49 PM    NAME: Jose Figueroa   : 1965  AGE: 58 y.o.  Code Status:   Advance Directives       Power of  Living Will ACP-Advance Directive ACP-Power of     Not on File Not on File Not on File Not on File          Full Code    Dx: Coronary artery disease, unspecified vessel or lesion type, unspecified whether angina present, unspecified whether native or transplanted heart [I25.10]  CAD in native artery [I25.10]  ICD Code: CAD Isolation Status: There are no current isolations documented for this patient.  Allergies: No Known Allergies  Medical Hx:   Past Medical History:   Diagnosis Date    CVA (cerebral vascular accident) (HCC)     Diabetes mellitus (HCC)     Hyperlipidemia     Hypertension         Past Surgical History:   Procedure Laterality Date    BRAIN SURGERY      CARDIAC PROCEDURE N/A 2024    Left heart cath / coronary angiography performed by Basil Genao MD at [facility] CARDIAC CATH LAB    CORONARY ARTERY BYPASS GRAFT N/A 2024    CORONARY ARTERY BYPASS GRAFT X2, LIMA - LAD, SVG - PDA; LEFT ENDOSCOPIC GREATER SAPHENOUS VEIN HARVEST; INTRAOPERATIVE TRANSESOPHAGEAL ECHOCARDIOGRAM performed by Kaden Trejo MD at [facility] OR    INVASIVE VASCULAR N/A 2024    Angiography renal bilat performed by Basil Genao MD at [facility] CARDIAC CATH LAB       Background: ***  Precautions:          Diet: ADULT DIET; Regular; 4 carb choices (60 gm/meal); Low Fat/Low Chol/High Fiber/2 gm Na  Accuchecks: ***  Labs:   Lab Results   Component Value Date    WBC 9.7 2024    HGB 12.2 (L) 2024     Comprehensive Nutrition Assessment    Type and Reason for Visit:  Initial, Positive Nutrition Screen, Consult (MST 5, Poor PO)    Nutrition Recommendations/Plan:   Modify current diet to reg/4carb/cardiac  Ensure Clear x2/d (480kcals, 16g pro)  Monitor and document all PO/ONS intakes and Bms in I/Os     Malnutrition Assessment:  Malnutrition Status:  No malnutrition (10/05/23 1203)    Context:  Chronic Illness     Findings of the 6 clinical characteristics of malnutrition:  Energy Intake:  Unable to assess  Weight Loss:  No significant weight loss (wt gain per EMR)     Body Fat Loss:  No significant body fat loss (visual)     Muscle Mass Loss:  No significant muscle mass loss (visual)    Fluid Accumulation:  No significant fluid accumulation     Strength:  Not Performed    Nutrition Assessment:    Pt admitted for GLF x3, currently undergoing chemo 2/2 breast CA. +dehydration, FTT, and LUCIE. Screened MST 5 for >34# unintentional wt loss pta and RDs consulted for poor PO. UTO nutr/wt hx, pt w/ n/v upon assessment. Per EMR, noted 4# wt gain x3mo pta. Pt currently on reg diet w/ONS, observed ~25% intakes at L though pt unable to elaborate on dislike in meal/ONS 2/2 n/v. Will modify to 4carb/lowfat 2/2 NAFLD and extreme BG fluctuations, plan to modify ONS to clears help meet needs in setting of hypermetabolic disease. Labs: Na 135, BUN 28, Cr 1.18, Gluc 281, H/H: 10.1/30. Meds: calcium carb-cholecalciferol, fe sulfate, humulaog/humulin, losartan. megace, Vit D. Nutrition Related Findings:    NFPE deferred 2/2 contact precautions, appears nourished. +n/v, no d/c nor h/o c/s issues. Last BM, pta. No edema. Wound Type: Pressure Injury (sacrum)       Current Nutrition Intake & Therapies:    Average Meal Intake: 1-25%  Average Supplements Intake: 1-25%  ADULT DIET;  Regular  ADULT ORAL NUTRITION SUPPLEMENT; AM Snack, PM Snack; Diabetic Oral Supplement  ADULT ORAL NUTRITION SUPPLEMENT; Breakfast, Lunch, Dinner; Standard Discharge paperwork complete. Just print when patient ready to leave. Primary nurse aware. Hospitalist Progress Note    Subjective:   Daily Progress Note: 10/6/2023 8:31 AM    Hospital Course: Ana Wilkerson is a 69 y.o. female with medical history of breast cancer on chemo, CVA, diabetes, hypertension that presented to the ED with chief complaint of frequent fall. She reports fall due to generalized weakness, especially lower extremities. Denies focal neurological symptoms or vertigo prior to fall. No LOC. Otherwise denies fever chills, no UTI symptoms. She reports has been having loss of appetite and has not been eating much. Patient lives alone. Chemotherapy every Tuesday, patient does not know her oncologist.     In the ED, Vital signs stable. Lab work showed no leukocytosis, mild LUCIE. UA not indicative of UTI. CT of the abdomen showed no acute process, breast cancer again noted. No acute findings on CT head and neck. Oncology consulted. Started on appetite stimulate. PT/OT evaluation for possible placement. Subjective/Chief Complaint: Patient says she slept well. NO nausea, vomiting, diarrhea, or abdominal pain.      Current Facility-Administered Medications   Medication Dose Route Frequency    aspirin EC tablet 81 mg  81 mg Oral Daily    calcium carb-cholecalciferol 600-5 MG-MCG tablet 1 tablet  1 tablet Oral Daily    ferrous sulfate (IRON 325) tablet 325 mg  325 mg Oral Daily    Vitamin D (CHOLECALCIFEROL) tablet 1,000 Units  1,000 Units Oral Daily    sodium chloride flush 0.9 % injection 5-40 mL  5-40 mL IntraVENous 2 times per day    sodium chloride flush 0.9 % injection 5-40 mL  5-40 mL IntraVENous PRN    0.9 % sodium chloride infusion   IntraVENous PRN    enoxaparin (LOVENOX) injection 40 mg  40 mg SubCUTAneous Daily    ondansetron (ZOFRAN-ODT) disintegrating tablet 4 mg  4 mg Oral Q8H PRN    Or    ondansetron (ZOFRAN) injection 4 mg  4 mg IntraVENous Q6H PRN    polyethylene glycol (GLYCOLAX) packet 17 g  17 g Oral Daily PRN    acetaminophen (TYLENOL) tablet 650 mg  650 mg HCT 37.7 (L) 02/06/2024    MCV 86.9 02/06/2024     02/06/2024     Recent Labs     02/04/24  0452 02/05/24  0551 02/06/24  0535   BUN 13 13 14           Estimated Creatinine Clearance: 94 mL/min (based on SCr of 1.1 mg/dL).  INR:   Recent Labs     02/04/24  0452 02/05/24  0551 02/06/24  0535   INR 1.1 1.1 1.0     No results for input(s): \"BMP\" in the last 72 hours.  LDA:   Peripheral IV 01/31/24 Left;Lateral;Dorsal;Medial Forearm (Active)   Site Assessment Clean, dry & intact 02/06/24 1200   Line Status Normal saline locked;Capped 02/06/24 1200   Line Care Connections checked and tightened 02/06/24 1200   Phlebitis Assessment No symptoms 02/06/24 1200   Infiltration Assessment 0 02/06/24 1200   Alcohol Cap Used Yes 02/06/24 1200   Dressing Status Clean, dry & intact 02/06/24 1200   Dressing Type Transparent 02/06/24 1200   Dressing Intervention New 01/31/24 1525       Peripheral IV 01/31/24 Left;Anterior;Medial Forearm (Active)   Site Assessment Clean, dry & intact 02/06/24 1200   Line Status Normal saline locked;Capped 02/06/24 1200   Line Care Connections checked and tightened 02/06/24 1200   Phlebitis Assessment No symptoms 02/06/24 1200   Infiltration Assessment 0 02/06/24 1200   Alcohol Cap Used Yes 02/06/24 1200   Dressing Status Clean, dry & intact 02/06/24 1200   Dressing Type Transparent 02/06/24 1200   Dressing Intervention New 01/31/24 1530     Mobility:  Bed Mobility assist ***  Ambulatory: Device ***  Assist Level ***  Toileting assist ***  Any restrictions per surgeon or wt bearing precautions? Please make sure these are in orders.  Vitals: MEWS Score: 1  Level of Consciousness: Alert (0)   Vitals:    02/06/24 0954 02/06/24 1000 02/06/24 1100 02/06/24 1312   BP: (!) 163/92 (!) 154/99 (!) 129/90 (!) 119/90   Pulse: 78 82 76 84   Resp:  20 19 19   Temp:    97.8 °F (36.6 °C)   TempSrc:    Oral   SpO2:  97% (!) 84% 98%   Weight:       Height:          Pain:   No data recorded Pain Meds: ***  Mental  Interpretation:   Toxic drug monitoring:   [x] Toxicity monitoring of Insulin: POC freq glucose monitoring to assess/prevent hyper/hypoglycemia   [x] Toxicity monitoring anticoagulation requiring Daily labs to monitor adverse side effects, daily or freq CBC, physical assessment for bleeding, bruising, ecchymosis:   [] Toxicity monitoring for antibiotic requiring frequent laboratory monitoring to assess therapeutic levels versus toxic levels and adequacy of treatment with frequent CBCs, inflammatory markers, therapeutic levels:     Reviewed most current lab test results and cultures  YES  Reviewed most current radiology test results   YES  Review and summation of old records today    NO  Reviewed patient's current orders and MAR    YES  PMH/SH reviewed - no change compared to H&P    CODE STATUS Full     VTE prophylaxis: Lovenox  Ulcer prophylaxis: Not indicated     Care Plan discussed with: Patient/Family, Nurse, and     Total time spent with patient: 36 minutes.

## 2024-02-06 NOTE — PROGRESS NOTES
Physical Therapy    Physical Therapy Treatment Note  Name: Jose Figueroa MRN: [id number] :   1965   Date:  2024   Admission Date: 2024 Room:  A   Restrictions/Precautions:        Sternal, no pushing, pulling or lifting more than 5 pounds for the first 2 weeks and then 10 pounds up to 3 months,   Arms are to support chest when changing position, coughing or sneezing. No lifting arms above 90 degrees except with the ex's    Recommended discharge to ARU    Communication with other providers:  RN states pt is ok to see for therapy  Subjective:  Patient states:  agreeable  Pain:   Location, Type, Intensity (0/10 to 10/10):  denies pain    Objective:    Observation:  pt was in the chair  Treatment, including education/measures:  Vital Signs  HR: 79, B/P 129/90 O2 96% room air  Education:   Sternal Precautions, Purpose of Exercise Program,    Intermediate Cardiac ex program:sitting  Overhead Side Stretch x 10  Ankle Pumps/ Heel Raises x 10  Marching Seated x 10  Forward Arm Raises x 10  Side Arm Raises x 10  Arm Crosses x 10  Arm Circles Forwards and Backwards x 10  SittingTrunkTwist x 10  Transfers with line management of O2  Scooting :SBA and VC's for sternal precautions  Sit to stand :min A of 1 and VC's for sternal precautions  Stand to sit :min A of 1 and VC's for sternal precautions  Gait:  Pt amb with No AD for 300 ft with CGA   Pt needed VC's for safe techs  Safety  Patient left safely in the chair, with call light/phone in reach  Gait belt was used for transfers and gait.    Assessment / Impression:    Patient's tolerance of treatment:  good   Adverse Reaction: none  Significant change in status and impact:  na  Barriers to improvement:  weakness, endurance,   Plan for Next Session:    Will cont to progress ex's and gt per cardiac protocol and educate pt on sternal precautions, S & S of ex intolerance, purpose of ex's, progression of ex's and gt at home.   Time in:  1141  Time out:   1208  Timed treatment minutes:  27  Total treatment time:  27  Previously filed items:     Short Term Goals  Time Frame for Short Term Goals: 1 week  Short Term Goal 1: Pt will complete bed mobility w mod A  Short Term Goal 2: Pt will complete STS w LRAD mod A  Short Term Goal 3: Pt will ambulate 100' w LRAD SBA     Electronically signed by:    Jona Dasilva PTA PTA  2/6/2024, 11:49 AM

## 2024-02-06 NOTE — PROGRESS NOTES
[facility] Cardiothoracic Surgery  Daily Progress Note    Surgeon:  Dr. Kaden Trejo         Subjective:  Mr. Figueroa is a 58 y.o. year-old male status post CORONARY ARTERY BYPASS GRAFT X2, LIMA - LAD, SVG - PDA; LEFT ENDOSCOPIC GREATER SAPHENOUS VEIN HARVEST; INTRAOPERATIVE TRANSESOPHAGEAL ECHOCARDIOGRAM  on 1/29/24.       Patient was seen and examined at bedside this morning without any complaints. Feeling good. Ready for discharge, awaiting insurance certification.         Hospital Course:  1/30/24 extubated to NC overnight. Wean dheeraj then epi for goal MAP>65. 500cc albumin 5%. Keep chest tubes and central line.    1/31/24 Increase metoprolol to 25mg PO BID. Lasix 40mg + K 20meq IV BID today. Extra 40 IV lasix this morning for total of 80 mg. Discontinue milrinone   2/1/24 Lasix 40mg IV daily. ID consult for antibiotic recommendations. Panculture. remove chest tubes   2/2/24: Lasix 40IV daily. ASA and Plavix. Increase Metoprolol to 37.5mg PO BID     2/3/24: Continue ASA/Plavix/Lasix/Metoprolol. Medically ready for DC.  2/4/24: Remains on Lasix 40 mg IV daily.  Medically ready for discharge.  Awaiting insurance approval.  2/5/24: Lasix decreased to 20 mg daily.  Transcutaneous pacing wires removed. Medically ready for discharge.  Awaiting insurance approval.    Vital Signs: /83   Pulse 84   Temp 97.9 °F (36.6 °C) (Oral)   Resp 22   Ht 1.753 m (5' 9\")   Wt (S) 115.7 kg (255 lb)   SpO2 97%   BMI 37.66 kg/m²  O2 Flow Rate (L/min): 2 L/min   Admit Weight: Weight - Scale: 120.3 kg (265 lb 2 oz)   WEIGHTWeight - Scale: (S) 115.7 kg (255 lb)     I/O's:  I/O last 3 completed shifts:  In: -   Out: 4258 [Urine:5375]    Data:    CBC:   Recent Labs     02/04/24  0452 02/05/24  0551 02/06/24  0535   WBC 6.0 8.2 9.7   HGB 10.5* 10.6* 12.2*   HCT 33.1* 33.5* 37.7*   MCV 86.6 87.7 86.9    306 397     BMP:   Recent Labs     02/04/24  0452 02/05/24  0551 02/06/24  0535  Oral Daily    sennosides-docusate sodium  1 tablet Oral BID    atorvastatin  40 mg Oral Nightly    famotidine  20 mg Oral BID    Or    famotidine (PEPCID) injection  20 mg IntraVENous BID    aspirin  81 mg Oral Daily     Continuous Infusions:    sodium chloride      phenylephrine 100 mcg/min (01/30/24 1005)    dextrose             Physical Exam:    General: A&O x 3, NAD noted  Heart: Normal S1, S2, RRR, No murmurs noted   Sternum: Prevena in place   Lungs: Diminished BS bilaterally at the bases.    Abdomen: Soft, non tender, non distended, Hypoactive BS x 4   : Stanton removed  Extremities: No edema noted bilateral LE. EVH sites: CDI     Assessment:    Mr. Figueroa is a 58 y.o. year-old male status post CORONARY ARTERY BYPASS GRAFT X2, LIMA - LAD, SVG - PDA; LEFT ENDOSCOPIC GREATER SAPHENOUS VEIN HARVEST; INTRAOPERATIVE TRANSESOPHAGEAL ECHOCARDIOGRAM  on 1/29/24.        Plan:  Continue Lasix 20 mg IV daily  ASA, statin, and Plavix  Metoprolol 37.5mg PO BID  Awaiting ARU precertification - he is medically ready for discharge to ARU once this is approved.     Level of Care: Step-down     DC Plan: Awaiting ARU precertification - he is medically ready for discharge once this is approved.     The above recommendations including medications and orders were discussed and agreed upon with Dr. Neil Hu PA-C 02/06/24 9:12 AM        New Consults 8:00AM-4:30PM: Call Office, 4:30PM to 8:00AM Surgeon on-call    CVICU or other units patient follow up: Rashad author of this note 8:00AM-4:30PM    CVICU patient or urgent follow up: 4:30PM to 8:00AM Call or Page Surgeon on-call     Step-down patient follow up: 4:30PM to 8:00AM Page or secure chat PA on-call

## 2024-02-06 NOTE — CARE COORDINATION
Received auth for admission to ARU.  Auth#[id number].  Notified NP and CM of approval in IDR.     Met with patient to notify him of approval from insurance to admit to ARU.  Patient expresses that he's still requesting to admit to ARU prior to returning home.     Discussed patients discharge plan from ARU.  Per patient he wants to discharge home with his son from ARU. Discussed the difference between ARU vs. SNF.  Patient expresses no interest in discharging to SNF.  Per patient his goal is to return home with son to his 2 acres of property. Patient states he's looking forward to Spring and to go cut grass once he regains his functional independence.     Patient meets criteria and is approved to come to ARU.   Patient able to admit once medically stable and after ARU Medical Director and  sign the pre-admission screen (PAS).

## 2024-02-07 ENCOUNTER — APPOINTMENT (OUTPATIENT)
Dept: GENERAL RADIOLOGY | Age: 59
DRG: 166 | End: 2024-02-07
Attending: PHYSICAL MEDICINE & REHABILITATION
Payer: COMMERCIAL

## 2024-02-07 PROBLEM — I10 ESSENTIAL HYPERTENSION: Status: ACTIVE | Noted: 2024-02-07

## 2024-02-07 PROBLEM — I69.359 HEMIPARESIS AND OTHER LATE EFFECTS OF CEREBROVASCULAR ACCIDENT (HCC): Status: ACTIVE | Noted: 2024-02-07

## 2024-02-07 PROBLEM — E11.65 UNCONTROLLED TYPE 2 DIABETES MELLITUS WITH HYPERGLYCEMIA (HCC): Status: ACTIVE | Noted: 2024-02-07

## 2024-02-07 PROBLEM — E66.01 CLASS 2 SEVERE OBESITY DUE TO EXCESS CALORIES WITH SERIOUS COMORBIDITY AND BODY MASS INDEX (BMI) OF 38.0 TO 38.9 IN ADULT (HCC): Status: ACTIVE | Noted: 2024-02-07

## 2024-02-07 PROBLEM — R53.1 GENERALIZED WEAKNESS: Status: ACTIVE | Noted: 2024-02-07

## 2024-02-07 PROBLEM — R26.9 GAIT DISTURBANCE: Status: ACTIVE | Noted: 2024-02-07

## 2024-02-07 PROBLEM — E66.812 CLASS 2 SEVERE OBESITY DUE TO EXCESS CALORIES WITH SERIOUS COMORBIDITY AND BODY MASS INDEX (BMI) OF 38.0 TO 38.9 IN ADULT: Status: ACTIVE | Noted: 2024-02-07

## 2024-02-07 PROBLEM — I69.398 HEMIPARESIS AND OTHER LATE EFFECTS OF CEREBROVASCULAR ACCIDENT (HCC): Status: ACTIVE | Noted: 2024-02-07

## 2024-02-07 PROBLEM — D62 ACUTE BLOOD LOSS ANEMIA: Status: ACTIVE | Noted: 2024-02-07

## 2024-02-07 PROBLEM — R52 UNCONTROLLED PAIN: Status: ACTIVE | Noted: 2024-02-07

## 2024-02-07 PROBLEM — I25.810 CORONARY ARTERY DISEASE INVOLVING CORONARY BYPASS GRAFT OF NATIVE HEART WITHOUT ANGINA PECTORIS: Status: ACTIVE | Noted: 2024-01-24

## 2024-02-07 LAB
ANION GAP SERPL CALCULATED.3IONS-SCNC: 8 MMOL/L (ref 7–16)
BUN SERPL-MCNC: 11 MG/DL (ref 6–23)
CALCIUM SERPL-MCNC: 8.5 MG/DL (ref 8.3–10.6)
CHLORIDE BLD-SCNC: 104 MMOL/L (ref 99–110)
CO2: 27 MMOL/L (ref 21–32)
CREAT SERPL-MCNC: 1 MG/DL (ref 0.9–1.3)
GFR SERPL CREATININE-BSD FRML MDRD: >60 ML/MIN/1.73M2
GLUCOSE BLD-MCNC: 102 MG/DL (ref 70–99)
GLUCOSE BLD-MCNC: 110 MG/DL (ref 70–99)
GLUCOSE BLD-MCNC: 120 MG/DL (ref 70–99)
GLUCOSE BLD-MCNC: 129 MG/DL (ref 70–99)
GLUCOSE BLD-MCNC: 152 MG/DL (ref 70–99)
GLUCOSE SERPL-MCNC: 126 MG/DL (ref 70–99)
HCT VFR BLD CALC: 32.7 % (ref 42–52)
HEMOGLOBIN: 10.7 GM/DL (ref 13.5–18)
MCH RBC QN AUTO: 28.2 PG (ref 27–31)
MCHC RBC AUTO-ENTMCNC: 32.7 % (ref 32–36)
MCV RBC AUTO: 86.3 FL (ref 78–100)
PDW BLD-RTO: 12.3 % (ref 11.7–14.9)
PLATELET # BLD: 378 K/CU MM (ref 140–440)
PMV BLD AUTO: 8.5 FL (ref 7.5–11.1)
POTASSIUM SERPL-SCNC: 4.6 MMOL/L (ref 3.5–5.1)
RBC # BLD: 3.79 M/CU MM (ref 4.6–6.2)
SODIUM BLD-SCNC: 139 MMOL/L (ref 135–145)
WBC # BLD: 7.7 K/CU MM (ref 4–10.5)

## 2024-02-07 PROCEDURE — 1280000000 HC REHAB R&B

## 2024-02-07 PROCEDURE — 85610 PROTHROMBIN TIME: CPT

## 2024-02-07 PROCEDURE — 97116 GAIT TRAINING THERAPY: CPT

## 2024-02-07 PROCEDURE — 94761 N-INVAS EAR/PLS OXIMETRY MLT: CPT

## 2024-02-07 PROCEDURE — 97535 SELF CARE MNGMENT TRAINING: CPT

## 2024-02-07 PROCEDURE — 82330 ASSAY OF CALCIUM: CPT

## 2024-02-07 PROCEDURE — 6370000000 HC RX 637 (ALT 250 FOR IP): Performed by: PHYSICIAN ASSISTANT

## 2024-02-07 PROCEDURE — 94150 VITAL CAPACITY TEST: CPT

## 2024-02-07 PROCEDURE — 85730 THROMBOPLASTIN TIME PARTIAL: CPT

## 2024-02-07 PROCEDURE — 97110 THERAPEUTIC EXERCISES: CPT

## 2024-02-07 PROCEDURE — 36415 COLL VENOUS BLD VENIPUNCTURE: CPT

## 2024-02-07 PROCEDURE — 71046 X-RAY EXAM CHEST 2 VIEWS: CPT

## 2024-02-07 PROCEDURE — 97166 OT EVAL MOD COMPLEX 45 MIN: CPT

## 2024-02-07 PROCEDURE — 97530 THERAPEUTIC ACTIVITIES: CPT

## 2024-02-07 PROCEDURE — 6360000002 HC RX W HCPCS: Performed by: PHYSICIAN ASSISTANT

## 2024-02-07 PROCEDURE — 82962 GLUCOSE BLOOD TEST: CPT

## 2024-02-07 PROCEDURE — 85027 COMPLETE CBC AUTOMATED: CPT

## 2024-02-07 PROCEDURE — 2580000003 HC RX 258: Performed by: PHYSICIAN ASSISTANT

## 2024-02-07 PROCEDURE — 6370000000 HC RX 637 (ALT 250 FOR IP): Performed by: PHYSICAL MEDICINE & REHABILITATION

## 2024-02-07 PROCEDURE — 97162 PT EVAL MOD COMPLEX 30 MIN: CPT

## 2024-02-07 PROCEDURE — 99233 SBSQ HOSP IP/OBS HIGH 50: CPT | Performed by: PHYSICAL MEDICINE & REHABILITATION

## 2024-02-07 PROCEDURE — 83735 ASSAY OF MAGNESIUM: CPT

## 2024-02-07 PROCEDURE — 80048 BASIC METABOLIC PNL TOTAL CA: CPT

## 2024-02-07 RX ADMIN — ACETAMINOPHEN 650 MG: 325 TABLET ORAL at 17:29

## 2024-02-07 RX ADMIN — INSULIN GLARGINE 14 UNITS: 100 INJECTION, SOLUTION SUBCUTANEOUS at 08:24

## 2024-02-07 RX ADMIN — ENOXAPARIN SODIUM 30 MG: 100 INJECTION SUBCUTANEOUS at 08:24

## 2024-02-07 RX ADMIN — SENNOSIDES, DOCUSATE SODIUM 1 TABLET: 8.6; 5 TABLET ORAL at 08:25

## 2024-02-07 RX ADMIN — METOPROLOL TARTRATE 37.5 MG: 25 TABLET, FILM COATED ORAL at 08:26

## 2024-02-07 RX ADMIN — INSULIN LISPRO 4 UNITS: 100 INJECTION, SOLUTION INTRAVENOUS; SUBCUTANEOUS at 12:24

## 2024-02-07 RX ADMIN — POTASSIUM CHLORIDE 10 MEQ: 1500 TABLET, EXTENDED RELEASE ORAL at 08:25

## 2024-02-07 RX ADMIN — POLYETHYLENE GLYCOL (3350) 17 G: 17 POWDER, FOR SOLUTION ORAL at 08:25

## 2024-02-07 RX ADMIN — ASPIRIN 81 MG: 81 TABLET, CHEWABLE ORAL at 08:25

## 2024-02-07 RX ADMIN — SODIUM CHLORIDE, PRESERVATIVE FREE 10 ML: 5 INJECTION INTRAVENOUS at 21:00

## 2024-02-07 RX ADMIN — BACITRACIN: 500 OINTMENT TOPICAL at 08:25

## 2024-02-07 RX ADMIN — FAMOTIDINE 20 MG: 20 TABLET ORAL at 21:26

## 2024-02-07 RX ADMIN — GABAPENTIN 300 MG: 300 CAPSULE ORAL at 14:20

## 2024-02-07 RX ADMIN — ACETAMINOPHEN 650 MG: 325 TABLET ORAL at 10:38

## 2024-02-07 RX ADMIN — GABAPENTIN 300 MG: 300 CAPSULE ORAL at 21:25

## 2024-02-07 RX ADMIN — GABAPENTIN 300 MG: 300 CAPSULE ORAL at 08:26

## 2024-02-07 RX ADMIN — SERTRALINE HYDROCHLORIDE 100 MG: 50 TABLET, FILM COATED ORAL at 08:25

## 2024-02-07 RX ADMIN — ACETAMINOPHEN 650 MG: 325 TABLET ORAL at 21:25

## 2024-02-07 RX ADMIN — ACETAMINOPHEN 650 MG: 325 TABLET ORAL at 06:07

## 2024-02-07 RX ADMIN — ENOXAPARIN SODIUM 30 MG: 100 INJECTION SUBCUTANEOUS at 21:26

## 2024-02-07 RX ADMIN — Medication 1 TABLET: at 08:25

## 2024-02-07 RX ADMIN — SODIUM CHLORIDE, PRESERVATIVE FREE 10 ML: 5 INJECTION INTRAVENOUS at 08:33

## 2024-02-07 RX ADMIN — FUROSEMIDE 20 MG: 10 INJECTION, SOLUTION INTRAMUSCULAR; INTRAVENOUS at 08:26

## 2024-02-07 RX ADMIN — FAMOTIDINE 20 MG: 20 TABLET ORAL at 08:25

## 2024-02-07 RX ADMIN — ATORVASTATIN CALCIUM 40 MG: 40 TABLET, FILM COATED ORAL at 21:26

## 2024-02-07 RX ADMIN — ENALAPRIL MALEATE 5 MG: 5 TABLET ORAL at 08:25

## 2024-02-07 RX ADMIN — CLOPIDOGREL BISULFATE 75 MG: 75 TABLET ORAL at 08:25

## 2024-02-07 RX ADMIN — TRAZODONE HYDROCHLORIDE 50 MG: 50 TABLET ORAL at 21:25

## 2024-02-07 RX ADMIN — METOPROLOL TARTRATE 37.5 MG: 25 TABLET, FILM COATED ORAL at 21:24

## 2024-02-07 ASSESSMENT — PAIN SCALES - GENERAL
PAINLEVEL_OUTOF10: 0
PAINLEVEL_OUTOF10: 0

## 2024-02-07 ASSESSMENT — PAIN SCALES - WONG BAKER
WONGBAKER_NUMERICALRESPONSE: 0

## 2024-02-07 NOTE — PROGRESS NOTES
Independent  Chair/Bed-to-Chair Transfer  Assistance Needed: Supervision or touching assistance  Comment: CGA without AD (compliance to sternal precautions when sitting decreased as fatigue increased and when distracted)  CARE Score: 4  Discharge Goal: Independent  Toilet Transfer  Assistance needed: Supervision or touching assistance  Comment: CGA to sit following sternal precautions; per pt's report he was able to stand from the toilet following sternal precautions (pt stood without PT in the area as he wanted his privacy)  CARE Score: 4  Car Transfer  Assistance Needed: Supervision or touching assistance  Comment: CGA without AD following sternal/cardiac precautions (pt stepped in back passenger seat while supporting his chest)  CARE Score: 4  Discharge Goal: Independent    Ambulation:    Walking Ability  Does the Patient Walk?: Yes     Walk 10 Feet  Assistance Needed: Supervision or touching assistance  Comment: CGA without AD  CARE Score: 4  Discharge Goal: Independent     Walk 50 Feet with Two Turns  Assistance Needed: Supervision or touching assistance  Comment: CGA without AD  CARE Score: 4  Discharge Goal: Independent     Walk 150 Feet  Assistance Needed: Supervision or touching assistance  Comment: CGA without AD  CARE Score: 4  Discharge Goal: Independent     Walking 10 Feet on Uneven Surfaces  Comment: limited by time constraint today; mobility task will be assessed tomorrow and will be scored accordingly  Discharge Goal: Independent     1 Step (Curb)  Comment: limited by time constraint today; mobility task will be assessed tomorrow and will be scored accordingly  Discharge Goal: Independent     4 Steps  Comment: limited by time constraint today; mobility task will be assessed tomorrow and will be scored accordingly  Discharge Goal: Independent     12 Steps  Comment: limited by time constraint today; mobility task will be attempted tomorrow and will be scored accordingly  Discharge Goal:  Independent    Gait Deviations: []None []Slow rand  [] Increased JENARO  [] Staggers []Deviated Path  [] Decreased step length  [] Decreased step height  []Decreased arm swing  [] Shuffles  [] Decreased head and trunk rotation  []other:        Wheelchair:  w/c Ability: Wheelchair Ability  Uses a Wheelchair and/or Scooter?: No                Balance:        Object: Picking Up Object  Assistance Needed: Supervision or touching assistance  Comment: CGA in unsupported stance, flexed his knees to bend forward and  target object with R hand  CARE Score: 4  Discharge Goal: Independent  OCCUPATIONAL THERAPY:  Goals:             Short Term Goals  Time Frame for Short Term Goals: STGs=LTGs :  Long Term Goals  Time Frame for Long Term Goals : 7 days or until d/c.  Long Term Goal 1: Pt will complete oral hygiene and grooming tasks with IND.  Long Term Goal 2: Pt will complete total body bathing with AE PRN and Mod I.  Long Term Goal 3: Pt will complete UB dressing with IND.  Long Term Goal 4: Pt will complete LB dressing with AE PRN and Mod I.  Long Term Goal 5: Pt will doff/don footwear with AE PRN and Mod I.  Additional Goals?: Yes  Long Term Goal 6: Pt will complete toileting with Mod I.  Long Term Goal 7: Pt will complete functional transfers (bed, chair, shower, toilet) with DME PRN and Mod I.  Long Term Goal 8: Pt will perform therex/therax to facilitate an increase in strength/endurance with emphasis on dynamic standing balance/tolerance > 8 mins with Mod I.  Long Term Goal 9: Pt will perform an IADL with Mod I. :    These goals were reviewed with this patient at the time of assessment and Jose Figueroa is in agreement    Plan of Care:  Pt to be seen 5 days per week for a minimum of 60 minutes for 7 days.          Occupational Therapy Plan  Current Treatment Recommendations: Strengthening, Safety education & training, Balance training, Home management training, Patient/Caregiver education & training,

## 2024-02-07 NOTE — PATIENT CARE CONFERENCE
bowel and bladder    [x] Has had an adequate number of bowel movements   [] Urinates with no urinary retention >300ml in bladder   [] Targeting bladder protocol with aiken removal   [x] Maintaining O2 SATs at 92% or greater   [x] Has pain managed while on ARU         [x] Has had no skin breakdown while on ARU   [] Has improved skin integrity via wound measurements   [] Has no signs/symptoms of infection at the wound site   [] Pressure wounds Stage/Location:    [] Arrived on unit with pressure wound  [x] Has been free from injury during hospitalization   [] Has experienced a fall during hospitalization  [] Ongoing education with patient/family with understanding demonstrated for:  [] Receives IV Fluids  [] Other:        NUTRITION  Weight - Scale: 117.3 kg (258 lb 9.6 oz) / Body mass index is 38.19 kg/m².  Current diet: ADULT DIET; Regular; 4 carb choices (60 gm/meal); Low Fat/Low Chol/High Fiber/2 gm Na  ADULT ORAL NUTRITION SUPPLEMENT; Breakfast, Lunch, Dinner; Diabetic Oral Supplement  Intake: good appetite, recent meal intake %      Medical improvements/barriers: Improved transfers, sciatica pain, some LOB, very private in bathroom tasks        Team goals for next treatment period/Intervention for current barriers:   [] Pt will increase activity tolerance for daily tasks.  [] Pt will improve bed mobility with reduced assist.  [x] Pt will improve safety in fx tasks with reduced cues/assist  [x] Pt will improve transfers with reduced assist  [x] Pt will improve toileting with reduced assist  [] Pt will improve ADL's with use of adaptive equipment with reduced assist  [x] Pt will improve pain mgmt for maximum participation in tx program  [] Pt will improve communication to get basic needs met on unit  [] Pt will improve swallowing for safe diet advancement with use of strategies  []  Plan for discharge to home.  [x]  Overall team goal being targeted this week: Improve transfers with reduced assist     Patient

## 2024-02-07 NOTE — PROGRESS NOTES
ARU Admission Assessment - [facility]      A Complete drug regimen review was completed for this patient this date.   [x]  No clinically significant medication issue was identified   []  Yes, a clinically significant medication issue was identified     []  Adverse Drug Event:    []  Allergy:    []  Side Effect:    []  Ineffective Therapy:    []  Drug interaction:     []  Duplicate Therapy:    []  Untreated Indication:    []  Non-adherence:    []  Other:  Nursing contacted the physician:       Date:                Time:    Actions recommended by physician were completed:   Date:                 Time:  Action(s) Taken:             []  New Physician Order Received    []  Issue Noted by Physician; However No Action Required    []  Other:        Ethnicity  \"Are you of , /a, or Gabonese origin?\"  Check all that apply:  [x] A.  No, not of , /a, or Gabonese Origin  [] B.  Yes, Puerto Rican, Puerto Rican American, Chicano/a  [] C.  Yes, Citizen of [place]  [] D.  Yes, Aldo  [] E.  Yes, another , , or Gabonese origin  [] X.  Patient unable to respond    Race  \"What is your race?\"  Check all that apply:  [x] A.  White  [] B.  Black or   [] C.   or   [] D.   Kittitian  [] E.  Chinese  [] F.  Zimbabwean  [] G. Romansh  [] H.  Ukrainian  [] I.  Khmer  [] J.  Other   [] K.    [] L.  Ethiopian or Radha  [] M.  Swazi  [] N.  Other   [] X.  Patient unable to respond    Language  A.  \"What is your preferred language?\"   english    B.  \"Do you need or want an  to communicate with a doctor or health care staff?\"  Check only one:  [x] 0.  No  [] 1.  Yes  [] 9.  Unable to determine    Transportation  \"In the past 6-12 months, has lack of transportation kept you from medical appointments, meetings, work, or from getting things needed for daily living?\"  Check all that apply:  [] A.  Yes, it has kept me from  in box.  If not, SKIP down to \"social isolation\" question and continue**     Trouble falling or staying asleep, or sleeping too much   [] 0.  No  [x] 1.  Yes  [] 9. No Response [] 0.  Never or one day  [x] 1.  2-6 days (several days)  [] 2.  7-11 days (half or more of days)  [] 3.  12-14 days (nearly every day   Feeling tired or having little energy   [x] 0.  No  [] 1.  Yes  [] 9. No Response [x] 0.  Never or one day  [] 1.  2-6 days (several days)  [] 2.  7-11 days (half or more of days)  [] 3.  12-14 days (nearly every day   Poor appetite or overeating     [x] 0.  No  [] 1.  Yes  [] 9. No Response [x] 0.  Never or one day  [] 1.  2-6 days (several days)  [] 2.  7-11 days (half or more of days)  [] 3.  12-14 days (nearly every day   Feeling bad about yourself - or that you are a failure or have let yourself or your family down   [x] 0.  No  [] 1.  Yes  [] 9. No Response [x] 0.  Never or one day  [] 1.  2-6 days (several days)  [] 2.  7-11 days (half or more of days)  [] 3.  12-14 days (nearly every day   Trouble concentrating on things, such as reading the newspaper or watching television   [x] 0.  No  [] 1.  Yes  [] 9. No Response [x] 0.  Never or one day  [] 1.  2-6 days (several days)  [] 2.  7-11 days (half or more of days)  [] 3.  12-14 days (nearly every day   Moving or speaking so slowly that other people could have noticed.  Or the opposite- being so fidgety or restless that you have been moving around a lot more than usual.   [x] 0.  No  [] 1.  Yes  [] 9. No Response [x] 0.  Never or one day  [] 1.  2-6 days (several days)  [] 2.  7-11 days (half or more of days)  [] 3.  12-14 days (nearly every day   Thoughts that you would be better off dead, or of hurting yourself in some way.   [x] 0.  No  [] 1.  Yes  [] 9. No Response [x] 0.  Never or one day  [] 1.  2-6 days (several days)  [] 2.  7-11 days (half or more of days)  [] 3.  12-14 days (nearly every day     Social Isolation  \"How often do you feel lonely

## 2024-02-07 NOTE — PROGRESS NOTES
02/07/24 0928   Encounter Summary   Encounter Overview/Reason  Follow-up   Service Provided For: Patient   Referral/Consult From: [facility]   Support System Family members   Last Encounter  02/07/24  (Too (pt.) states he is now in his own condo.  He is optimistic moving to the rehab floor.  Blessings given.)   Complexity of Encounter Low   Begin Time 0915   End Time  0929   Total Time Calculated 14 min   Spiritual/Emotional needs   Type Spiritual Support   Assessment/Intervention/Outcome   Assessment Calm   Intervention Active listening;Prayer (assurance of)/[place];Sustaining Presence/Ministry of presence   Outcome Comfort;Coping;Encouraged;Engaged in conversation;Optimistic   Plan and Referrals   Plan/Referrals Continue Support (comment)  (Support as needed)

## 2024-02-07 NOTE — PROGRESS NOTES
Cardiothoracic Surgery     Cardiologist: Basil Genao MD      CC: CABG        HISTORY OF PRESENT ILLNESS:  Jose Figueroa is a 58 y.o. male who presents today for:  1. Postop followup s/p CORONARY ARTERY BYPASS GRAFT X2, LIMA - LAD, SVG - PDA; LEFT ENDOSCOPIC GREATER SAPHENOUS VEIN HARVEST; INTRAOPERATIVE TRANSESOPHAGEAL ECHOCARDIOGRAM  on 01/29/2024  2. Pt is doing well, continues to improve, increasing activity level as tolerated - no SOB or CP noted.States he hasn't felt this good in years     PHYSICAL EXAM:   VITALS:  /82 (Site: Right Upper Arm, Position: Sitting, Cuff Size: Large Adult)   Pulse 71   Ht 1.753 m (5' 9\")   Wt 117.4 kg (258 lb 12.8 oz)   SpO2 97%   BMI 38.22 kg/m²      Physical Exam:  Gen: alert, well appearing, and in no distress  Chest:  Sternotomy site well approximated, clean and dry.  Mild erythema noted distal incision.No, swelling, or drainage. Chest tube insertion sites clean and dry, sutures all removed   Lung: clear to auscultation, no wheezes or rales, and unlabored breathing  Heart: S1 and S2 normal, no murmur, click, gallop or rub  Extremities: peripheral pulses normal, no pedal edema, no clubbing or cyanosis     ROS:   See pertinent ROS noted in HPI    Radiological and other results:  CXR: no pneumothorax or effusion    Assessment:  Pt continues progress well, no c/o at this time  Sternal and LE wound healing     Plan:  Pt to f/u with cardiology on date 2/15/2024   Return to office in 2 weeks wound check  Increase lasix and potassium 40/20 for one week     Kaden Trejo MD 02/20/24 1:51 PM

## 2024-02-07 NOTE — H&P
rehabilitation with occupational and physical therapy 180 minutes 5 out of every 7 days. Will address basic and  advancing mobility with self-care instruction and adaptive equipment training. Caregiver education will be offered. Expected length of stay  prior to a supervised level of function for discharge home with a walker and [facility] OT/PT is 2 weeks.    Additional recommendation:    CHF following cardiac surgery with gait disturbance: The patient requires daily occupational and physical therapy.  With his poorly controlled pain, chronic right-sided weakness and unfamiliarity with sternal precautions, he is at risk for fall with injury during standing ADLs and mobility activities.  Therefore, we must provide him adaptive equipment training with strengthening exercises, balance recovery training and conditioning development.  We must instruct him on self-care activities and transfer techniques following sternal precautions.  Monitoring his sternal incision for signs of infection.  We must optimize blood sugar and blood pressure control.  Monitoring bowel and bladder function and providing retraining if needed.  He needs aggressive pulmonary hygiene measures, nutritional support and DVT prophylaxis.  Continuing his postsurgical diuresis with IV Lasix.  He is on antiplatelet therapy with a baby aspirin and Plavix (DAPT).  Continuing his statin therapy.  Providing afterload reduction with Lopressor.  We expect to extend caregiver training to his family prior to the transition home.  DVT prophylaxis: Continuing Lovenox 30 mg subcu twice daily.  This raises his risk for spontaneous hemorrhage and GI bleeding.  Therefore, we must monitor his hemoglobin and platelet count regularly.  Providing GI prophylaxis with an H2 blocker.  Weightbearing activities within the restrictions will be pursued daily.  Uncontrolled pain: Patient has been using narcotic medications up until the day before admission to rehab.  As his activity is  increased on rehab, this may need to be restarted.  Monitoring closely.  Providing scheduled acetaminophen 4 times daily and gabapentin.  Following sternal precautions.  Cryotherapy as needed for his chest wall pain.  Uncontrolled diabetes type 2 with hyperglycemia: Continuing diet modified for carbohydrates.  Providing Lantus 14 units twice a day and a Humalog sliding scale with each meal and bedtime blood sugar check.  Encouraging consistent oral hydration.  Essential hypertension: Using his diuretic and beta-blocker for blood pressure regulation.  Target systolic blood pressure is 120-140.  Vital signs are checked at rest and with activity.  CAD: Continuing DAPT as described above.  Regulating his blood pressure and supplementing his potassium.  Outpatient follow-up with cardiology.  Daily weights to detect any decompensation of his CHF.  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  We must incorporate ataxia and right-sided weakness strategies into his sternal precautions and therapy recovery from his open heart surgery.    I personally performed a history and physical on this patient within 24 hours of admission to the rehab unit. I have reviewed the preadmission screening and concur with its findings without change. A detailed plan of care will be established by hospital day 4 and I attest the patient is appropriate for inpatient rehabilitation at this time.  I have compared the patient's current functional status noted during my history and physical with that of the preadmission screen and I have found no significant differences.

## 2024-02-07 NOTE — PROGRESS NOTES
Occupational Therapy                              [facility] ARU OCCUPATIONAL THERAPY EVALUATION    Chart Review:  Past Medical History:   Diagnosis Date    CVA (cerebral vascular accident) (HCC)     Diabetes mellitus (HCC)     Hyperlipidemia     Hypertension      Past Surgical History:   Procedure Laterality Date    BRAIN SURGERY      CARDIAC PROCEDURE N/A 1/9/2024    Left heart cath / coronary angiography performed by Basil Genao MD at [facility] CARDIAC CATH LAB    CORONARY ARTERY BYPASS GRAFT N/A 1/29/2024    CORONARY ARTERY BYPASS GRAFT X2, LIMA - LAD, SVG - PDA; LEFT ENDOSCOPIC GREATER SAPHENOUS VEIN HARVEST; INTRAOPERATIVE TRANSESOPHAGEAL ECHOCARDIOGRAM performed by Kaden Trejo MD at [facility] OR    INVASIVE VASCULAR N/A 1/9/2024    Angiography renal bilat performed by Basil Genao MD at [facility] CARDIAC CATH LAB     Social History:  Social/Functional History  Lives With: Son (son: \"Ash\" (22 y.o; stays at home all the time currently))  Type of Home: Trailer  Home Access: Stairs to enter without rails  Entrance Stairs - Number of Steps: 6  Bathroom Shower/Tub: Tub/Shower unit  Bathroom Toilet: Standard  Home Equipment: Cane  Has the patient had two or more falls in the past year or any fall with injury in the past year?: No (1 fall at home but without significant injury)  ADL Assistance: Independent  Homemaking Responsibilities: Yes  Meal Prep Responsibility: Primary  Laundry Responsibility: Primary  Cleaning Responsibility: Primary  Bill Paying/Finance Responsibility: Primary  Shopping Responsibility: Primary  Dependent Care Responsibility: No  Health Care Management: Primary (comes pre-packaged from pharmacy per month)  Ambulation Assistance: Independent  Transfer Assistance: Independent  Active : Yes  Mode of Transportation: [facility]  Occupation: On disability  Additional Comments: sleeps in bed/couch; Hx of CVA that affected his R side before    Restrictions:   Restrictions/Precautions  Restrictions/Precautions: ROM Restrictions, Fall Risk, Surgical Protocols        Position Activity Restriction  Sternal Precautions: No Pushing, No Pulling, 5# Lifting Restrictions  Other position/activity restrictions: IV access LUE    Pain Level: 0         IRF-CAM (Confusion Assessment Method): Confusion Assessment Method (CAM)  Is there evidence of an acute change in mental status from the patient's baseline?: No  Inattention: Behavior not present  Disorganized thinking: Behavior not present  Altered level of consciousness: Behavior not present    Objective:  Observation/Palpation  Observation: Pt sitting in recliner upon entrance preparing to get up with PCA to go to the bathroom.  Scar: Pt with incision to medial chest down to upper abdomen open to air, well-adpproximated and with minimal drainage that nursing was made aware of.      Vision  Vision Exceptions: Wears glasses for reading     Hearing  Hearing: Within functional limits    ROM:      LUE AROM (degrees)  LUE AROM : WNL     Left Hand AROM (degrees)  Left Hand AROM: WNL     RUE AROM (degrees)  RUE AROM : WNL     Right Hand AROM (degrees)  Right Hand AROM: WNL    Strength:    LUE Strength  L Hand General: 5/5  LUE Strength Comment: Did not test formally d/t sternal precautions, however at least 4+/5 observed in function.  RUE Strength  R Hand General: 5/5  RUE Strength Comment: Did not test formally d/t sternal precautions, however at least 4+/5 observed in function.    Quality of Movement:  Tone RUE  RUE Tone: Normotonic  Tone LUE  LUE Tone: Normotonic  Coordination  Movements Are Fluid And Coordinated: Yes       Sensation:    Sensation  Overall Sensation Status: Impaired (intermittent tingling/numbness on L hand with Hx of CTS)     ADLs:    Eating: Eating  Assistance Needed: Independent  Comment: Per Pt report, able to open packages/containers to eat meals IND.  CARE Score: 6  Discharge Goal: Independent       Oral Hygiene:

## 2024-02-07 NOTE — PROGRESS NOTES
Physical Therapy    [facility] ARU PHYSICAL THERAPY EVALUATION    Chart Review:  Past Medical History:   Diagnosis Date    CVA (cerebral vascular accident) (HCC)     Diabetes mellitus (HCC)     Hyperlipidemia     Hypertension      Past Surgical History:   Procedure Laterality Date    BRAIN SURGERY      CARDIAC PROCEDURE N/A 1/9/2024    Left heart cath / coronary angiography performed by Basil Genao MD at [facility] CARDIAC CATH LAB    CORONARY ARTERY BYPASS GRAFT N/A 1/29/2024    CORONARY ARTERY BYPASS GRAFT X2, LIMA - LAD, SVG - PDA; LEFT ENDOSCOPIC GREATER SAPHENOUS VEIN HARVEST; INTRAOPERATIVE TRANSESOPHAGEAL ECHOCARDIOGRAM performed by Kaden Trejo MD at [facility] OR    INVASIVE VASCULAR N/A 1/9/2024    Angiography renal bilat performed by Basil Genao MD at [facility] CARDIAC CATH LAB     Fall History: Has the patient had two or more falls in the past year or any fall with injury in the past year?: No (1 fall at home but without significant injury)  Social History:  Social/Functional History  Lives With: Son (son: \"Ash\" (22 y.o; stays at home all the time currently))  Type of Home: Trailer  Home Access: Stairs to enter without rails  Entrance Stairs - Number of Steps: 6  Bathroom Shower/Tub: Tub/Shower unit  Bathroom Toilet: Standard  Home Equipment: Cane  Has the patient had two or more falls in the past year or any fall with injury in the past year?: No (1 fall at home but without significant injury)  ADL Assistance: Independent  Homemaking Responsibilities: Yes  Meal Prep Responsibility: Primary  Laundry Responsibility: Primary  Cleaning Responsibility: Primary  Bill Paying/Finance Responsibility: Primary  Shopping Responsibility: Primary  Dependent Care Responsibility: No  Health Care Management: Primary (comes pre-packaged from pharmacy per month)  Ambulation Assistance: Independent  Transfer Assistance: Independent  Active : Yes  Mode of Transportation: [facility]  Occupation: On disability  Additional

## 2024-02-07 NOTE — PROGRESS NOTES
4 Eyes Skin Assessment     NAME:  Jose Figueroa  YOB: 1965  MEDICAL RECORD NUMBER:  [id number]    The patient is being assessed for  Admission    I agree that at least one RN has performed a thorough Head to Toe Skin Assessment on the patient. ALL assessment sites listed below have been assessed.      Areas assessed by both nurses:    Head, Face, Ears, Shoulders, Back, Chest, Arms, Elbows, Hands, Sacrum. Buttock, Coccyx, Ischium, and Legs. Feet and Heels        Does the Patient have a Wound? Yes wound(s) were present on assessment. LDA wound assessment was Initiated and completed by RN Small scabbed area noted on Left buttocks cheek; scattered abrasions especially noted on Right leg; scattered ecchymosis with heavy bruising on Left leg;  Left leg incision. Chest incision with multiple red and excoriated areas.        Fletcher Prevention initiated by RN: Yes  Wound Care Orders initiated by RN: Yes    Pressure Injury (Stage 3,4, Unstageable, DTI, NWPT, and Complex wounds) if present, place Wound referral order by RN under : No    New Ostomies, if present place, Ostomy referral order under : No     Nurse 1 eSignature: Electronically signed by Renee Craft RN on 2/7/24 at 3:07 AM EST    **SHARE this note so that the co-signing nurse can place an eSignature**    Nurse 2 eSignature: Electronically signed by ARLEEN WHATLEY on 2/7/24 at 3:16 AM EST

## 2024-02-08 LAB
GLUCOSE BLD-MCNC: 116 MG/DL (ref 70–99)
GLUCOSE BLD-MCNC: 119 MG/DL (ref 70–99)
GLUCOSE BLD-MCNC: 140 MG/DL (ref 70–99)
GLUCOSE BLD-MCNC: 161 MG/DL (ref 70–99)

## 2024-02-08 PROCEDURE — 94761 N-INVAS EAR/PLS OXIMETRY MLT: CPT

## 2024-02-08 PROCEDURE — 97535 SELF CARE MNGMENT TRAINING: CPT

## 2024-02-08 PROCEDURE — 97110 THERAPEUTIC EXERCISES: CPT

## 2024-02-08 PROCEDURE — 99233 SBSQ HOSP IP/OBS HIGH 50: CPT | Performed by: PHYSICAL MEDICINE & REHABILITATION

## 2024-02-08 PROCEDURE — 97530 THERAPEUTIC ACTIVITIES: CPT

## 2024-02-08 PROCEDURE — 82962 GLUCOSE BLOOD TEST: CPT

## 2024-02-08 PROCEDURE — 2580000003 HC RX 258: Performed by: PHYSICIAN ASSISTANT

## 2024-02-08 PROCEDURE — 1280000000 HC REHAB R&B

## 2024-02-08 PROCEDURE — 97116 GAIT TRAINING THERAPY: CPT

## 2024-02-08 PROCEDURE — 6360000002 HC RX W HCPCS: Performed by: PHYSICIAN ASSISTANT

## 2024-02-08 PROCEDURE — 6370000000 HC RX 637 (ALT 250 FOR IP): Performed by: PHYSICIAN ASSISTANT

## 2024-02-08 PROCEDURE — 6370000000 HC RX 637 (ALT 250 FOR IP): Performed by: PHYSICAL MEDICINE & REHABILITATION

## 2024-02-08 RX ADMIN — ENOXAPARIN SODIUM 30 MG: 100 INJECTION SUBCUTANEOUS at 09:39

## 2024-02-08 RX ADMIN — ACETAMINOPHEN 650 MG: 325 TABLET ORAL at 09:38

## 2024-02-08 RX ADMIN — SENNOSIDES, DOCUSATE SODIUM 1 TABLET: 8.6; 5 TABLET ORAL at 09:38

## 2024-02-08 RX ADMIN — GABAPENTIN 300 MG: 300 CAPSULE ORAL at 15:25

## 2024-02-08 RX ADMIN — ASPIRIN 81 MG: 81 TABLET, CHEWABLE ORAL at 09:39

## 2024-02-08 RX ADMIN — ACETAMINOPHEN 650 MG: 325 TABLET ORAL at 21:32

## 2024-02-08 RX ADMIN — INSULIN LISPRO 4 UNITS: 100 INJECTION, SOLUTION INTRAVENOUS; SUBCUTANEOUS at 09:40

## 2024-02-08 RX ADMIN — BACITRACIN: 500 OINTMENT TOPICAL at 06:23

## 2024-02-08 RX ADMIN — SODIUM CHLORIDE, PRESERVATIVE FREE 10 ML: 5 INJECTION INTRAVENOUS at 11:47

## 2024-02-08 RX ADMIN — INSULIN GLARGINE 14 UNITS: 100 INJECTION, SOLUTION SUBCUTANEOUS at 21:47

## 2024-02-08 RX ADMIN — ATORVASTATIN CALCIUM 40 MG: 40 TABLET, FILM COATED ORAL at 21:32

## 2024-02-08 RX ADMIN — METOPROLOL TARTRATE 37.5 MG: 25 TABLET, FILM COATED ORAL at 21:31

## 2024-02-08 RX ADMIN — BACITRACIN: 500 OINTMENT TOPICAL at 21:32

## 2024-02-08 RX ADMIN — TRAZODONE HYDROCHLORIDE 50 MG: 50 TABLET ORAL at 21:48

## 2024-02-08 RX ADMIN — CLOPIDOGREL BISULFATE 75 MG: 75 TABLET ORAL at 09:37

## 2024-02-08 RX ADMIN — ACETAMINOPHEN 650 MG: 325 TABLET ORAL at 17:37

## 2024-02-08 RX ADMIN — FAMOTIDINE 20 MG: 20 TABLET ORAL at 21:32

## 2024-02-08 RX ADMIN — GABAPENTIN 300 MG: 300 CAPSULE ORAL at 21:31

## 2024-02-08 RX ADMIN — ENOXAPARIN SODIUM 30 MG: 100 INJECTION SUBCUTANEOUS at 21:32

## 2024-02-08 RX ADMIN — ENALAPRIL MALEATE 5 MG: 5 TABLET ORAL at 09:37

## 2024-02-08 RX ADMIN — Medication 1 TABLET: at 09:37

## 2024-02-08 RX ADMIN — INSULIN GLARGINE 14 UNITS: 100 INJECTION, SOLUTION SUBCUTANEOUS at 09:39

## 2024-02-08 RX ADMIN — FAMOTIDINE 20 MG: 20 TABLET ORAL at 09:39

## 2024-02-08 RX ADMIN — POTASSIUM CHLORIDE 10 MEQ: 1500 TABLET, EXTENDED RELEASE ORAL at 09:37

## 2024-02-08 RX ADMIN — METOPROLOL TARTRATE 37.5 MG: 25 TABLET, FILM COATED ORAL at 09:38

## 2024-02-08 RX ADMIN — BACITRACIN: 500 OINTMENT TOPICAL at 09:36

## 2024-02-08 RX ADMIN — ACETAMINOPHEN 650 MG: 325 TABLET ORAL at 06:19

## 2024-02-08 RX ADMIN — SERTRALINE HYDROCHLORIDE 100 MG: 50 TABLET, FILM COATED ORAL at 09:39

## 2024-02-08 RX ADMIN — GABAPENTIN 300 MG: 300 CAPSULE ORAL at 09:39

## 2024-02-08 RX ADMIN — FUROSEMIDE 20 MG: 10 INJECTION, SOLUTION INTRAMUSCULAR; INTRAVENOUS at 11:46

## 2024-02-08 ASSESSMENT — PAIN SCALES - WONG BAKER
WONGBAKER_NUMERICALRESPONSE: 0
WONGBAKER_NUMERICALRESPONSE: 0

## 2024-02-08 NOTE — PROGRESS NOTES
Jose Figueroa    : 1965  Acct #: [id number]  MRN: [id number]              PM&R Progress Note      Admitting diagnosis: Congestive heart failure after coronary bypass graft surgery (IGC 9.0)     Comorbid diagnoses impacting rehabilitation: Uncontrolled pain, generalized weakness, gait disturbance, acute blood loss anemia, CAD, uncontrolled diabetes type 2 with hyperglycemia, essential hypertension, late effects of CVA with right hemiparesis, obesity (38.4), mixed hyperlipidemia    Chief complaint: Very little chest pain.  Mild dizziness with position change.    Prior (baseline) level of function: Independent.    Current level of function:         Current  IRF-RUBEN and Goals:   Occupational Therapy:    Short Term Goals  Time Frame for Short Term Goals: STGs=LTGs :   Long Term Goals  Time Frame for Long Term Goals : 7 days or until d/c.  Long Term Goal 1: Pt will complete oral hygiene and grooming tasks with IND.  Long Term Goal 2: Pt will complete total body bathing with AE PRN and Mod I.  Long Term Goal 3: Pt will complete UB dressing with IND.  Long Term Goal 4: Pt will complete LB dressing with AE PRN and Mod I.  Long Term Goal 5: Pt will doff/don footwear with AE PRN and Mod I.  Additional Goals?: Yes  Long Term Goal 6: Pt will complete toileting with Mod I.  Long Term Goal 7: Pt will complete functional transfers (bed, chair, shower, toilet) with DME PRN and Mod I.  Long Term Goal 8: Pt will perform therex/therax to facilitate an increase in strength/endurance with emphasis on dynamic standing balance/tolerance > 8 mins with Mod I.  Long Term Goal 9: Pt will perform an IADL with Mod I. :                                       Eating: Eating  Assistance Needed: Independent  Comment: Per Pt report, able to open packages/containers to eat meals IND.  CARE Score: 6  Discharge Goal: Independent       Oral Hygiene: Oral Hygiene  Assistance Needed: Supervision or touching assistance  Comment: CG/SBA in stance to

## 2024-02-08 NOTE — PROGRESS NOTES
patient required safety education as he refused use of personal alarms and states concerns related to aggravation of his sciatica with prolonged sitting that's why he would like to get up on his own. Use of call light was reinforced and that he can get up and ambulate with a staff member with him even outside PT sessions.     Treatment/Activity Tolerance:   [x] Tolerated treatment with no adverse effects    [] Patient limited by fatigue  [] Patient limited by pain   [] Patient limited by medical complications:    [] Adverse reaction to Tx:   [] Significant change in status    Safety:       []  bed alarm set    []  chair alarm set    [x]  Pt refused alarms                []  Telesitter activated      [x]  Gait belt used during tx session      []other:       Number of Minutes/Billable Intervention  Gait Training 30   Therapeutic Exercise    Neuro Re-Ed    Therapeutic Activity 30   Wheelchair Propulsion    Group    Other:    TOTAL 60     Social History  Social/Functional History  Lives With: Son (son: \"Ash\" (22 y.o; stays at home all the time currently))  Type of Home: Trailer  Home Access: Stairs to enter without rails  Entrance Stairs - Number of Steps: 6  Bathroom Shower/Tub: Tub/Shower unit  Bathroom Toilet: Standard  Home Equipment: Cane  Has the patient had two or more falls in the past year or any fall with injury in the past year?: No (1 fall at home but without significant injury)  ADL Assistance: Independent  Homemaking Responsibilities: Yes  Meal Prep Responsibility: Primary  Laundry Responsibility: Primary  Cleaning Responsibility: Primary  Bill Paying/Finance Responsibility: Primary  Shopping Responsibility: Primary  Dependent Care Responsibility: No  Health Care Management: Primary (comes pre-packaged from pharmacy per month)  Ambulation Assistance: Independent  Transfer Assistance: Independent  Active : Yes  Mode of Transportation: [facility]  Occupation: On disability  Additional Comments: sleeps in

## 2024-02-08 NOTE — CARE COORDINATION
Case Management Admission Note      Patient:Jose Figueroa      :1965  MRN:[id number]  Rehab Dx/Hx: Atherosclerotic heart disease of native coronary artery with other forms of angina pectoris [I25.118]  CHF following cardiac surgery, postop [I97.130]    Chief Complaint:   Past Medical History:   Diagnosis Date    CVA (cerebral vascular accident) (HCC)     Diabetes mellitus (HCC)     Hyperlipidemia     Hypertension      Past Surgical History:   Procedure Laterality Date    BRAIN SURGERY      CARDIAC PROCEDURE N/A 2024    Left heart cath / coronary angiography performed by Basil Genao MD at [facility] CARDIAC CATH LAB    CORONARY ARTERY BYPASS GRAFT N/A 2024    CORONARY ARTERY BYPASS GRAFT X2, LIMA - LAD, SVG - PDA; LEFT ENDOSCOPIC GREATER SAPHENOUS VEIN HARVEST; INTRAOPERATIVE TRANSESOPHAGEAL ECHOCARDIOGRAM performed by Kaden Trejo MD at [facility] OR    INVASIVE VASCULAR N/A 2024    Angiography renal bilat performed by Basil Genao MD at [facility] CARDIAC CATH LAB     No Known Allergies  Precautions: falls and cardiac    Date of Admit: 2024  Room #: 1013/1013-A      Current functional status at time of admit:        Home Living/DME Available:      Type of Home: Trailer  Home Access: Stairs to enter without rails  Bathroom Shower/Tub: Tub/Shower unit  Bathroom Toilet: Standard     Home Equipment: Cane       IADL Hx:   Homemaking Responsibilities: Yes  Active : Yes  Mode of Transportation: SUV  Occupation: On disability          Spouse: none  Family:lives with son    Patient's Goal:    Discharge home with PRN family support  Family's Goal: n/a  Comments:  Patient plans dc to mobile home with son.  Son is home continuously.  6 TERE.  Patient was indep & driving PTA.  Whiteboard updated.    Gayle Choudhary, 2024, 7:37 PM

## 2024-02-08 NOTE — CARE COORDINATION
Patient reviewed at today's care conference.  Patient will dc home with son 2/13/24.  [facility] pt/ot/RN recommended.  SC recommended, but patient declined.  Patient declining personal alarms.  Currently cga with no device.  No DME recommendations.    Case mgt met with patient in room.  He's pleased with dc date and plan.  He has no dc concerns.  Whiteboard updated.  Patient provided with list of [facility] agencies and is agreeable to any that accept his caresource medicaid.

## 2024-02-08 NOTE — PROGRESS NOTES
Comprehensive Nutrition Assessment    Type and Reason for Visit:  Initial, Consult (oral nutrition supplement)    Nutrition Recommendations/Plan:   Continue cardiac, carb controlled diet   Will resume diabetic oral nutrition supplement during stay  Will continue to follow up during stay      Malnutrition Assessment:  Malnutrition Status:  No malnutrition (02/08/24 1011)    Context:  Chronic Illness       Nutrition Assessment:    Admit to acute rehab unit with recent CABG. Currently on carb controlled, cardiac diet with hx DM and CAD. Patient reports good appetite at this time. Not used to eating breakfast but agreeable to establishing more consistent eating pattern. Provided copy of heart [facility] carb controlled meal plan from nutrition care manual per patient request. Will resume diabetic oral nutriton supplement. Will continue to follow at low nutrition risk with adequate intake at this time.    Nutrition Related Findings:    up in chair, sternal incision open to air,   hx DM   HbA1c 6.6% Wound Type: Surgical Incision       Current Nutrition Intake & Therapies:    Average Meal Intake: %  Average Supplements Intake: None Ordered  ADULT DIET; Regular; 4 carb choices (60 gm/meal); Low Fat/Low Chol/High Fiber/2 gm Na    Anthropometric Measures:  Height: 175.3 cm (5' 9\")  Ideal Body Weight (IBW): 160 lbs (73 kg)       Current Body Weight: 117.3 kg (258 lb 9.6 oz), 161.6 % IBW. Weight Source: Bed Scale  Current BMI (kg/m2): 38.2  Usual Body Weight: 118.8 kg (262 lb) ( Sept 2023)  % Weight Change (Calculated): -1.3  Weight Adjustment For: No Adjustment                 BMI Categories: Obese Class 2 (BMI 35.0 -39.9)    Estimated Daily Nutrient Needs:  Energy Requirements Based On: Kcal/kg  Weight Used for Energy Requirements: Current  Energy (kcal/day): 2580 (22 almaz/kg)  Weight Used for Protein Requirements: Ideal  Protein (g/day):  (1.2-1.4 g/kg)  Method Used for Fluid Requirements: 1 ml/kcal  Fluid  (ml/day): 2500    Nutrition Diagnosis:   Predicted inadequate energy intake related to increase demand for energy/nutrients as evidenced by wounds    Nutrition Interventions:   Food and/or Nutrient Delivery: Continue Current Diet, Start Oral Nutrition Supplement  Nutrition Education/Counseling: Education initiated  Coordination of Nutrition Care: Continue to monitor while inpatient  Plan of Care discussed with: patient    Goals:     Goals: PO intake 75% or greater, Teach back diet education, prior to discharge       Nutrition Monitoring and Evaluation:   Behavioral-Environmental Outcomes: None Identified  Food/Nutrient Intake Outcomes: Food and Nutrient Intake, Supplement Intake, Diet Advancement/Tolerance  Physical Signs/Symptoms Outcomes: Biochemical Data, Skin, Weight, Meal Time Behavior    Discharge Planning:    Continue current diet     Meseret Mclain RD, LD  Contact: [phone]

## 2024-02-08 NOTE — PROGRESS NOTES
Physical Rehabilitation: OCCUPATIONAL THERAPY     [x] daily progress note       [] discharge       Patient Name:  Jose Figueroa   :  1965 MRN: [id number]  Room:  [address] [place] [address] Date of Admission: 2024  Rehabilitation Diagnosis:   Atherosclerotic heart disease of native coronary artery with other forms of angina pectoris [I25.118]  CHF following cardiac surgery, postop [I97.130]       Date 2024       Day of ARU Week:  3   Time IN/OUT 0545-7547 ,3311-0788   Individual Tx Minutes 60+60   Group Tx Minutes    Co-Treat Minutes    Concurrent Tx Minutes    TOTAL Tx Time Mins 120   Variance Time    Variance Reason []   Refusal due to:     []   Medical hold/reason:    []   Illness   []   Off Unit for test/procedure  []   Extra time needed to complete task  []   Therapeutic need  []   Make up mins were attempted but pt unable to complete due to (specify)  []   Other (specify):   Restrictions Restrictions/Precautions: ROM Restrictions, Fall Risk, Surgical Protocols         Communication with other providers: [x]   OK to see per nursing:     []   Spoke with team member regarding:      Subjective observations and cognitive status: Pt agreeable to therapy in am and pm this date.     Pain level/location:    /10       Location:    Discharge recommendations  Anticipated discharge date:    Destination: []home alone   []home alone w assist prn   [] home w/ family    [] Continuous supervision       []SNF    [] Assisted living     [] Other:   Continued therapy: []HHC OT  []OUTPATIENT  OT   [] No Further OT  Equipment needs: TBD       ADLs:    Toileting: Toileting Hygiene  Assistance needed: Supervision or touching assistance  Comment: SBA during standing urination  CARE Score: 4  Discharge Goal: Independent      Toilet Transfers:   SBA Toilet Transfer  Assistance needed: Supervision or touching assistance  Comment: SBA and VCs to follow sternal precautions  CARE Score: 4  Discharge Goal: Independent  Device Used:

## 2024-02-09 LAB
GLUCOSE BLD-MCNC: 109 MG/DL (ref 70–99)
GLUCOSE BLD-MCNC: 119 MG/DL (ref 70–99)
GLUCOSE BLD-MCNC: 134 MG/DL (ref 70–99)
GLUCOSE BLD-MCNC: 154 MG/DL (ref 70–99)

## 2024-02-09 PROCEDURE — 97535 SELF CARE MNGMENT TRAINING: CPT

## 2024-02-09 PROCEDURE — 2580000003 HC RX 258: Performed by: PHYSICIAN ASSISTANT

## 2024-02-09 PROCEDURE — 6370000000 HC RX 637 (ALT 250 FOR IP): Performed by: PHYSICAL MEDICINE & REHABILITATION

## 2024-02-09 PROCEDURE — 82962 GLUCOSE BLOOD TEST: CPT

## 2024-02-09 PROCEDURE — 97110 THERAPEUTIC EXERCISES: CPT

## 2024-02-09 PROCEDURE — 6370000000 HC RX 637 (ALT 250 FOR IP): Performed by: PHYSICIAN ASSISTANT

## 2024-02-09 PROCEDURE — 1280000000 HC REHAB R&B

## 2024-02-09 PROCEDURE — 6360000002 HC RX W HCPCS: Performed by: PHYSICIAN ASSISTANT

## 2024-02-09 PROCEDURE — 97530 THERAPEUTIC ACTIVITIES: CPT

## 2024-02-09 PROCEDURE — 94761 N-INVAS EAR/PLS OXIMETRY MLT: CPT

## 2024-02-09 PROCEDURE — 99233 SBSQ HOSP IP/OBS HIGH 50: CPT | Performed by: PHYSICAL MEDICINE & REHABILITATION

## 2024-02-09 PROCEDURE — 94150 VITAL CAPACITY TEST: CPT

## 2024-02-09 PROCEDURE — 97116 GAIT TRAINING THERAPY: CPT

## 2024-02-09 RX ADMIN — FAMOTIDINE 20 MG: 20 TABLET ORAL at 21:07

## 2024-02-09 RX ADMIN — SODIUM CHLORIDE, PRESERVATIVE FREE 10 ML: 5 INJECTION INTRAVENOUS at 21:00

## 2024-02-09 RX ADMIN — SODIUM CHLORIDE, PRESERVATIVE FREE 10 ML: 5 INJECTION INTRAVENOUS at 09:00

## 2024-02-09 RX ADMIN — ENOXAPARIN SODIUM 30 MG: 100 INJECTION SUBCUTANEOUS at 09:00

## 2024-02-09 RX ADMIN — BACITRACIN: 500 OINTMENT TOPICAL at 21:22

## 2024-02-09 RX ADMIN — INSULIN GLARGINE 14 UNITS: 100 INJECTION, SOLUTION SUBCUTANEOUS at 21:07

## 2024-02-09 RX ADMIN — SERTRALINE HYDROCHLORIDE 100 MG: 50 TABLET, FILM COATED ORAL at 08:59

## 2024-02-09 RX ADMIN — ENOXAPARIN SODIUM 30 MG: 100 INJECTION SUBCUTANEOUS at 21:10

## 2024-02-09 RX ADMIN — METOPROLOL TARTRATE 37.5 MG: 25 TABLET, FILM COATED ORAL at 08:59

## 2024-02-09 RX ADMIN — ENALAPRIL MALEATE 5 MG: 5 TABLET ORAL at 09:02

## 2024-02-09 RX ADMIN — FAMOTIDINE 20 MG: 20 TABLET ORAL at 08:59

## 2024-02-09 RX ADMIN — METOPROLOL TARTRATE 37.5 MG: 25 TABLET, FILM COATED ORAL at 21:06

## 2024-02-09 RX ADMIN — SODIUM CHLORIDE, PRESERVATIVE FREE 10 ML: 5 INJECTION INTRAVENOUS at 06:30

## 2024-02-09 RX ADMIN — ASPIRIN 81 MG: 81 TABLET, CHEWABLE ORAL at 08:59

## 2024-02-09 RX ADMIN — ACETAMINOPHEN 650 MG: 325 TABLET ORAL at 11:46

## 2024-02-09 RX ADMIN — ACETAMINOPHEN 650 MG: 325 TABLET ORAL at 06:14

## 2024-02-09 RX ADMIN — GABAPENTIN 300 MG: 300 CAPSULE ORAL at 21:07

## 2024-02-09 RX ADMIN — ACETAMINOPHEN 650 MG: 325 TABLET ORAL at 17:37

## 2024-02-09 RX ADMIN — GABAPENTIN 300 MG: 300 CAPSULE ORAL at 08:59

## 2024-02-09 RX ADMIN — GABAPENTIN 300 MG: 300 CAPSULE ORAL at 13:31

## 2024-02-09 RX ADMIN — POTASSIUM CHLORIDE 10 MEQ: 1500 TABLET, EXTENDED RELEASE ORAL at 08:59

## 2024-02-09 RX ADMIN — FUROSEMIDE 20 MG: 10 INJECTION, SOLUTION INTRAMUSCULAR; INTRAVENOUS at 10:43

## 2024-02-09 RX ADMIN — TRAZODONE HYDROCHLORIDE 50 MG: 50 TABLET ORAL at 21:07

## 2024-02-09 RX ADMIN — ATORVASTATIN CALCIUM 40 MG: 40 TABLET, FILM COATED ORAL at 21:06

## 2024-02-09 RX ADMIN — INSULIN GLARGINE 14 UNITS: 100 INJECTION, SOLUTION SUBCUTANEOUS at 09:00

## 2024-02-09 RX ADMIN — BACITRACIN: 500 OINTMENT TOPICAL at 09:02

## 2024-02-09 RX ADMIN — CLOPIDOGREL BISULFATE 75 MG: 75 TABLET ORAL at 08:59

## 2024-02-09 RX ADMIN — Medication 1 TABLET: at 08:59

## 2024-02-09 RX ADMIN — ACETAMINOPHEN 650 MG: 325 TABLET ORAL at 21:06

## 2024-02-09 NOTE — PROGRESS NOTES
Physical Rehabilitation: OCCUPATIONAL THERAPY     [x] daily progress note       [] discharge       Patient Name:  Jose Figueroa   :  1965 MRN: [id number]  Room:  [address] [place] [address] Date of Admission: 2024  Rehabilitation Diagnosis:   Atherosclerotic heart disease of native coronary artery with other forms of angina pectoris [I25.118]  CHF following cardiac surgery, postop [I97.130]       Date 2024       Day of ARU Week:  4   Time IN/OUT 1100/1200   Individual Tx Minutes 60   Group Tx Minutes    Co-Treat Minutes    Concurrent Tx Minutes    TOTAL Tx Time Mins 60   Variance Time    Variance Reason []   Refusal due to:     []   Medical hold/reason:    []   Illness   []   Off Unit for test/procedure  []   Extra time needed to complete task  []   Therapeutic need  []   Make up mins were attempted but pt unable to complete due to (specify)  []   Other (specify):   Restrictions Restrictions/Precautions: ROM Restrictions, Fall Risk, Surgical Protocols         Communication with other providers: [x]   OK to see per nursing:     []   Spoke with team member regarding:      Subjective observations and cognitive status: Patient up in recliner upon approach, pleasant and agreeable to therapy declines shower     Mid therapy session: patient surgical site is oozing through shirt patient does not have bandage covering, nursing notified ([place]) and came to  check prior to covering with bandage, nursing covers with small bandage duirng treatment session      Pain level/location:    /10       Location: per patient no pain noted    Discharge recommendations  Anticipated discharge date:    Destination: []home alone   []home alone w assist prn   [x] home w/ family    [] Continuous supervision       []SNF    [] Assisted living     [] Other:   Continued therapy: [x]HHC OT  []OUTPATIENT  OT   [] No Further OT  Equipment needs: None        ADLs:    Toileting: Toileting Hygiene  Assistance needed: Independent  Comment: X  CARE

## 2024-02-09 NOTE — PROGRESS NOTES
Physical Therapy    [x] daily progress note       [] discharge       Patient Name:  Jose Figueroa   :  1965 MRN: [id number]  Room:  [address] [place] [address] Date of Admission: 2024  Rehabilitation Diagnosis:   Atherosclerotic heart disease of native coronary artery with other forms of angina pectoris [I25.118]  CHF following cardiac surgery, postop [I97.130]       Date 2024       Day of ARU Week:  4   Time IN//1033   Individual Tx Minutes 60   Group Tx Minutes    Co-Treat Minutes    Concurrent Tx Minutes    TOTAL Tx Time Mins 60   Variance Time    Variance Time []   Refusal due to:     []   Medical hold/reason:    []   Illness   []   Off Unit for test/procedure  []   Extra time needed to complete task  []   Therapeutic need  []   Attempted make-up minutes, however pt was unable to tolerate due to (specify)   []   Other (specify):   Restrictions Restrictions/Precautions  Restrictions/Precautions: ROM Restrictions, Fall Risk, Surgical Protocols  Position Activity Restriction  Sternal Precautions: No Pushing, No Pulling, 5# Lifting Restrictions  Other position/activity restrictions: IV access LUE   Interdisciplinary communication [x]   Cleared for therapy per nursing     []   RN notified about issues during session  []   RN updated on pt performance  []   Spoke with   []   Spoke with OT  []   Spoke with MD  []   Other:    Subjective observations and cognitive status: Pt resting in recliner with BLE elevated, states sleeping well last night, used urinal on bedside table, aware of discharge plan, chest incision site open to air and had medicated ointment on it that pt in agreement to wearing a T-shirt as he only had a zippered sweater on, reports being able to get out of bed earlier without physical assist using strategy/technique learned in PT session yesterday, routinely drinks >1 cup of coffee.    Pain level/location:    0/10       Location:    Discharge recommendations  Anticipated discharge  AD but distractions/impulsivity affect his balance/safety.      Treatment/Activity Tolerance:   [x] Tolerated treatment with no adverse effects    [] Patient limited by fatigue  [] Patient limited by pain   [] Patient limited by medical complications:    [] Adverse reaction to Tx:   [] Significant change in status    Safety:       []  bed alarm set    []  chair alarm set    [x]  Pt refused alarms                []  Telesitter activated      [x]  Gait belt used during tx session      []other:       Number of Minutes/Billable Intervention  Gait Training 30   Therapeutic Exercise    Neuro Re-Ed    Therapeutic Activity 30   Wheelchair Propulsion    Group    Other:    TOTAL 60     Social History  Social/Functional History  Lives With: Son (son: \"Ash\" (22 y.o; stays at home all the time currently))  Type of Home: Trailer  Home Access: Stairs to enter without rails  Entrance Stairs - Number of Steps: 6  Bathroom Shower/Tub: Tub/Shower unit  Bathroom Toilet: Standard  Home Equipment: Cane  Has the patient had two or more falls in the past year or any fall with injury in the past year?: No (1 fall at home but without significant injury)  ADL Assistance: Independent  Homemaking Responsibilities: Yes  Meal Prep Responsibility: Primary  Laundry Responsibility: Primary  Cleaning Responsibility: Primary  Bill Paying/Finance Responsibility: Primary  Shopping Responsibility: Primary  Dependent Care Responsibility: No  Health Care Management: Primary (comes pre-packaged from pharmacy per month)  Ambulation Assistance: Independent  Transfer Assistance: Independent  Active : Yes  Mode of Transportation: [facility]  Occupation: On disability  Additional Comments: sleeps in bed/couch; Hx of CVA that affected his R side before    Objective                                                                                    Goals:  (Update in navigator)  Short Term Goals  Time Frame for Short Term Goals: 7 tx days:  Short Term Goal 1: Pt

## 2024-02-09 NOTE — PROGRESS NOTES
Physical Rehabilitation: OCCUPATIONAL THERAPY     [x] daily progress note       [] discharge       Patient Name:  Jose Figueroa   :  1965 MRN: [id number]  Room:  [address] [place] [address] Date of Admission: 2024  Rehabilitation Diagnosis:   Atherosclerotic heart disease of native coronary artery with other forms of angina pectoris [I25.118]  CHF following cardiac surgery, postop [I97.130]       Date 2024       Day of ARU Week:  4   Time IN/OUT 9467-3306   Individual Tx Minutes 60   Group Tx Minutes    Co-Treat Minutes    Concurrent Tx Minutes    TOTAL Tx Time Mins 60   Variance Time    Variance Reason []   Refusal due to:     []   Medical hold/reason:    []   Illness   []   Off Unit for test/procedure  []   Extra time needed to complete task  []   Therapeutic need  []   Make up mins were attempted but pt unable to complete due to (specify)  []   Other (specify):   Restrictions Restrictions/Precautions: ROM Restrictions, Fall Risk, Surgical Protocols         Communication with other providers: [x]   OK to see per nursing:     []   Spoke with team member regarding:      Subjective observations and cognitive status: Pt seated in recliner chair upon arrival. Pt agreeable to therapy.     Pain level/location:    /10       Location:    Discharge recommendations  Anticipated discharge date:    Destination: []home alone   []home alone w assist prn   [x] home w/ family    [] Continuous supervision       []SNF    [] Assisted living     [] Other:   Continued therapy: [x]HHC OT  []OUTPATIENT  OT   [] No Further OT  Equipment needs: None        ADLs:    Toileting: Toileting Hygiene  Assistance needed: Independent  Comment: x  CARE Score: 6  Discharge Goal: Independent      Toilet Transfers:   Ind Toilet Transfer  Assistance needed: Independent  Comment: x  CARE Score: 6  Discharge Goal: Independent  Device Used:    [x]   Standard Toilet         []   Grab Bars           []  Bedside Commode       []   Elevated Toilet         areas of safety awareness and continued focus on this is recommended.       Treatment/Activity Tolerance:   [x] Tolerated treatment with no adverse effects    [] Patient limited by fatigue  [] Patient limited by pain   [] Patient limited by medical complications:    [] Adverse reaction to Tx:   [] Significant change in status    Safety:       []  bed alarm set    []  chair alarm set    [x]  Pt refused alarms                []  Telesitter activated      [x]  Gait belt used during tx session      []other:       Number of Minutes/Billable Intervention  Therapeutic Exercise 45   ADL Self-care    Neuro Re-Ed    Therapeutic Activity 15   Group    Other:    TOTAL 60       Social History  Social/Functional History  Lives With: Son (son: \"Ash\" (22 y.o; stays at home all the time currently))  Type of Home: Trailer  Home Access: Stairs to enter without rails  Entrance Stairs - Number of Steps: 6  Bathroom Shower/Tub: Tub/Shower unit  Bathroom Toilet: Standard  Home Equipment: Cane  Has the patient had two or more falls in the past year or any fall with injury in the past year?: No (1 fall at home but without significant injury)  ADL Assistance: Independent  Homemaking Responsibilities: Yes  Meal Prep Responsibility: Primary  Laundry Responsibility: Primary  Cleaning Responsibility: Primary  Bill Paying/Finance Responsibility: Primary  Shopping Responsibility: Primary  Dependent Care Responsibility: No  Health Care Management: Primary (comes pre-packaged from pharmacy per month)  Ambulation Assistance: Independent  Transfer Assistance: Independent  Active : Yes  Mode of Transportation: [facility]  Occupation: On disability  Additional Comments: sleeps in bed/couch; Hx of CVA that affected his R side before    Objective                                                                                    Goals:  (Update in navigator)  Short Term Goals  Time Frame for Short Term Goals: STGs=LTGs:  Long Term Goals  Time Frame for

## 2024-02-09 NOTE — PROGRESS NOTES
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  No pelvic pain or cramping.  Mild dyspnea with activities.    Prior (baseline) level of function: Independent.    Current level of function:         Current  IRF-RUBEN and Goals:   Occupational Therapy:    Short Term Goals  Time Frame for Short Term Goals: STGs=LTGs :   Long Term Goals  Time Frame for Long Term Goals : 7 days or until d/c.  Long Term Goal 1: Pt will complete oral hygiene and grooming tasks with IND.  Long Term Goal 2: Pt will complete total body bathing with AE PRN and Mod I.  Long Term Goal 3: Pt will complete UB dressing with IND.  Long Term Goal 4: Pt will complete LB dressing with AE PRN and Mod I.  Long Term Goal 5: Pt will doff/don footwear with AE PRN and Mod I.  Additional Goals?: Yes  Long Term Goal 6: Pt will complete toileting with Mod I.  Long Term Goal 7: Pt will complete functional transfers (bed, chair, shower, toilet) with DME PRN and Mod I.  Long Term Goal 8: Pt will perform therex/therax to facilitate an increase in strength/endurance with emphasis on dynamic standing balance/tolerance > 8 mins with Mod I.  Long Term Goal 9: Pt will perform an IADL with Mod I. :                                       Eating: Eating  Assistance Needed: Independent  Comment: Per Pt report, able to open packages/containers to eat meals IND.  CARE Score: 6  Discharge Goal: Independent       Oral Hygiene: Oral Hygiene  Assistance Needed: Supervision or touching assistance  Comment:  with his legs.  Good core control.    HEENT: Gazing right and left equally.  MMM.  Clear speech.    Pulmonary: Blunting in the bases but no coughing or wheezing.    Cardiac: Regular rate and rhythm.  Sternal incision is minimally tender and dry.    Abdomen: Patient's abdomen is soft and nondistended.  Bowel sounds were present throughout.  There was no rebound, guarding or masses noted.    Upper extremities: Appropriately limiting weightbearing with position changes.   is strong but he has significant clumsiness with right arm positioning.    Lower extremities: Trace edema.  Heels clear.  No signs of DVT.    Sitting balance was good.  Standing balance was poor.    Lab Results   Component Value Date    WBC 7.7 02/07/2024    HGB 10.7 (L) 02/07/2024    HCT 32.7 (L) 02/07/2024    MCV 86.3 02/07/2024     02/07/2024     Lab Results   Component Value Date    INR 1.0 02/06/2024    INR 1.1 02/05/2024    INR 1.1 02/04/2024    PROTIME 13.6 02/06/2024    PROTIME 14.4 02/05/2024    PROTIME 13.9 02/04/2024     Lab Results   Component Value Date    CREATININE 1.0 02/07/2024    BUN 11 02/07/2024     02/07/2024    K 4.6 02/07/2024     02/07/2024    CO2 27 02/07/2024     Lab Results   Component Value Date    ALT 14 01/22/2024    AST 10 (L) 01/22/2024    ALKPHOS 117 01/22/2024    BILITOT 0.4 01/22/2024       Expected length of stay  prior to a supervised level of function for discharge home with a walker and C OT/PT is in 10 to 12 days.    Recommendations:    CHF following cardiac surgery with gait disturbance: He is requiring rest breaks to endure the activities of his daily occupational and physical therapy.  With his persistent pain, chronic right-sided weakness and unfamiliarity with sternal precautions, he remains at risk for fall with injury during standing ADLs and mobility activities.  Continue providing him adaptive equipment training with strengthening exercises, balance recovery training and

## 2024-02-10 LAB
GLUCOSE BLD-MCNC: 122 MG/DL (ref 70–99)
GLUCOSE BLD-MCNC: 134 MG/DL (ref 70–99)
GLUCOSE BLD-MCNC: 140 MG/DL (ref 70–99)
GLUCOSE BLD-MCNC: 94 MG/DL (ref 70–99)

## 2024-02-10 PROCEDURE — 97110 THERAPEUTIC EXERCISES: CPT

## 2024-02-10 PROCEDURE — 6370000000 HC RX 637 (ALT 250 FOR IP): Performed by: PHYSICAL MEDICINE & REHABILITATION

## 2024-02-10 PROCEDURE — 6370000000 HC RX 637 (ALT 250 FOR IP): Performed by: PHYSICIAN ASSISTANT

## 2024-02-10 PROCEDURE — 97116 GAIT TRAINING THERAPY: CPT

## 2024-02-10 PROCEDURE — 82962 GLUCOSE BLOOD TEST: CPT

## 2024-02-10 PROCEDURE — 94761 N-INVAS EAR/PLS OXIMETRY MLT: CPT

## 2024-02-10 PROCEDURE — 97535 SELF CARE MNGMENT TRAINING: CPT

## 2024-02-10 PROCEDURE — 97530 THERAPEUTIC ACTIVITIES: CPT

## 2024-02-10 PROCEDURE — 6360000002 HC RX W HCPCS: Performed by: PHYSICIAN ASSISTANT

## 2024-02-10 PROCEDURE — 1280000000 HC REHAB R&B

## 2024-02-10 PROCEDURE — 94150 VITAL CAPACITY TEST: CPT

## 2024-02-10 PROCEDURE — 2580000003 HC RX 258: Performed by: PHYSICIAN ASSISTANT

## 2024-02-10 RX ADMIN — ENOXAPARIN SODIUM 30 MG: 100 INJECTION SUBCUTANEOUS at 08:28

## 2024-02-10 RX ADMIN — GABAPENTIN 300 MG: 300 CAPSULE ORAL at 15:42

## 2024-02-10 RX ADMIN — SERTRALINE HYDROCHLORIDE 100 MG: 50 TABLET, FILM COATED ORAL at 08:27

## 2024-02-10 RX ADMIN — CLOPIDOGREL BISULFATE 75 MG: 75 TABLET ORAL at 08:26

## 2024-02-10 RX ADMIN — SODIUM CHLORIDE, PRESERVATIVE FREE 10 ML: 5 INJECTION INTRAVENOUS at 08:27

## 2024-02-10 RX ADMIN — ENOXAPARIN SODIUM 30 MG: 100 INJECTION SUBCUTANEOUS at 22:00

## 2024-02-10 RX ADMIN — ATORVASTATIN CALCIUM 40 MG: 40 TABLET, FILM COATED ORAL at 22:01

## 2024-02-10 RX ADMIN — GABAPENTIN 300 MG: 300 CAPSULE ORAL at 08:28

## 2024-02-10 RX ADMIN — Medication 1 TABLET: at 08:27

## 2024-02-10 RX ADMIN — ACETAMINOPHEN 650 MG: 325 TABLET ORAL at 22:01

## 2024-02-10 RX ADMIN — SODIUM CHLORIDE, PRESERVATIVE FREE 10 ML: 5 INJECTION INTRAVENOUS at 22:15

## 2024-02-10 RX ADMIN — GABAPENTIN 300 MG: 300 CAPSULE ORAL at 22:01

## 2024-02-10 RX ADMIN — TRAZODONE HYDROCHLORIDE 50 MG: 50 TABLET ORAL at 22:01

## 2024-02-10 RX ADMIN — FAMOTIDINE 20 MG: 20 TABLET ORAL at 08:27

## 2024-02-10 RX ADMIN — INSULIN GLARGINE 14 UNITS: 100 INJECTION, SOLUTION SUBCUTANEOUS at 08:26

## 2024-02-10 RX ADMIN — SENNOSIDES, DOCUSATE SODIUM 1 TABLET: 8.6; 5 TABLET ORAL at 22:01

## 2024-02-10 RX ADMIN — ASPIRIN 81 MG: 81 TABLET, CHEWABLE ORAL at 08:26

## 2024-02-10 RX ADMIN — ACETAMINOPHEN 650 MG: 325 TABLET ORAL at 05:46

## 2024-02-10 RX ADMIN — BACITRACIN: 500 OINTMENT TOPICAL at 22:15

## 2024-02-10 RX ADMIN — ENALAPRIL MALEATE 5 MG: 5 TABLET ORAL at 08:27

## 2024-02-10 RX ADMIN — BACITRACIN: 500 OINTMENT TOPICAL at 08:28

## 2024-02-10 RX ADMIN — METOPROLOL TARTRATE 37.5 MG: 25 TABLET, FILM COATED ORAL at 22:01

## 2024-02-10 RX ADMIN — METOPROLOL TARTRATE 37.5 MG: 25 TABLET, FILM COATED ORAL at 08:26

## 2024-02-10 RX ADMIN — FAMOTIDINE 20 MG: 20 TABLET ORAL at 22:01

## 2024-02-10 RX ADMIN — ACETAMINOPHEN 650 MG: 325 TABLET ORAL at 12:42

## 2024-02-10 RX ADMIN — ACETAMINOPHEN 650 MG: 325 TABLET ORAL at 17:49

## 2024-02-10 RX ADMIN — POTASSIUM CHLORIDE 10 MEQ: 1500 TABLET, EXTENDED RELEASE ORAL at 08:26

## 2024-02-10 RX ADMIN — FUROSEMIDE 20 MG: 10 INJECTION, SOLUTION INTRAMUSCULAR; INTRAVENOUS at 08:27

## 2024-02-10 ASSESSMENT — PAIN DESCRIPTION - ONSET
ONSET: PROGRESSIVE
ONSET: PROGRESSIVE

## 2024-02-10 ASSESSMENT — PAIN DESCRIPTION - PAIN TYPE
TYPE: SURGICAL PAIN
TYPE: SURGICAL PAIN

## 2024-02-10 ASSESSMENT — PAIN SCALES - GENERAL
PAINLEVEL_OUTOF10: 0
PAINLEVEL_OUTOF10: 0
PAINLEVEL_OUTOF10: 6
PAINLEVEL_OUTOF10: 3
PAINLEVEL_OUTOF10: 0
PAINLEVEL_OUTOF10: 0

## 2024-02-10 ASSESSMENT — PAIN DESCRIPTION - ORIENTATION
ORIENTATION: MID
ORIENTATION: MID

## 2024-02-10 ASSESSMENT — PAIN DESCRIPTION - FREQUENCY
FREQUENCY: INTERMITTENT
FREQUENCY: INTERMITTENT

## 2024-02-10 ASSESSMENT — PAIN DESCRIPTION - DESCRIPTORS
DESCRIPTORS: ACHING
DESCRIPTORS: ACHING

## 2024-02-10 ASSESSMENT — PAIN DESCRIPTION - DIRECTION
RADIATING_TOWARDS: BACK
RADIATING_TOWARDS: BACK

## 2024-02-10 ASSESSMENT — PAIN DESCRIPTION - LOCATION
LOCATION: BACK
LOCATION: CHEST

## 2024-02-10 NOTE — PLAN OF CARE
Problem: Discharge Planning  Goal: Discharge to home or other facility with appropriate resources  Outcome: Progressing  Flowsheets (Taken 2/10/2024 0800)  Discharge to home or other facility with appropriate resources:   Identify barriers to discharge with patient and caregiver   Arrange for needed discharge resources and transportation as appropriate   Identify discharge learning needs (meds, wound care, etc)   Arrange for interpreters to assist at discharge as needed   Refer to discharge planning if patient needs post-hospital services based on physician order or complex needs related to functional status, cognitive ability or social support system     Problem: Safety - Adult  Goal: Free from fall injury  Outcome: Progressing     Problem: ABCDS Injury Assessment  Goal: Absence of physical injury  Outcome: Progressing     Problem: Pain  Goal: Verbalizes/displays adequate comfort level or baseline comfort level  Outcome: Progressing

## 2024-02-10 NOTE — PROGRESS NOTES
Physical Rehabilitation: OCCUPATIONAL THERAPY     [x] daily progress note       [] discharge       Patient Name:  Jose Figueroa   :  1965 MRN: [id number]  Room:  [address] [place] [address] Date of Admission: 2024  Rehabilitation Diagnosis:   Atherosclerotic heart disease of native coronary artery with other forms of angina pectoris [I25.118]  CHF following cardiac surgery, postop [I97.130]       Date 2/10/2024       Day of ARU Week:  5   Time IN/OUT 2564-6444  6437-3886   Individual Tx Minutes 120   Group Tx Minutes    Co-Treat Minutes    Concurrent Tx Minutes    TOTAL Tx Time Mins 120   Variance Time    Variance Reason []   Refusal due to:     []   Medical hold/reason:    []   Illness   []   Off Unit for test/procedure  []   Extra time needed to complete task  []   Therapeutic need  []   Make up mins were attempted but pt unable to complete due to (specify)  []   Other (specify):   Restrictions Restrictions/Precautions  Restrictions/Precautions: ROM Restrictions, Fall Risk, Surgical Protocols  Position Activity Restriction  Sternal Precautions: No Pushing, No Pulling, 5# Lifting Restrictions  Other position/activity restrictions: IV access LUE   Communication with other providers: [x]   OK to see per nursing:     []   Spoke with team member regarding:      Subjective observations and cognitive status: Pt alert orientated and agreeable to tx.     Pain level/location:   0 /10       Location: none reported   Discharge recommendations  Anticipated discharge date:    Destination: []home alone   []home alone w assist prn [] home w/ family    [] Continuous supervision       []SNF            [] Assisted living     [] Other:   Continued therapy: []HHC OT  []OUTPATIENT  OT   [] No Further OT  Equipment needs: TBD   Grooming:   CGA shaving  sink side in stance  Oral Hygiene:  CGA      Bathing:   UB: SUP          LB:SUP      Dressing:      Upper Body Dressing:  SUP tying gown in front then navigating head through neck  hole  Lower Body Dressing:  MOHAN reaching ipsilaterally to navigate feet through  Footwear: SUP using figure 4 leg technique    Toileting:   MOHAN for anterior vinny care       Toilet Transfers:   CGA  Device Used:    [x]   Standard Toilet         [x]   Grab Bars           []  Bedside Commode       []   Elevated Toilet          []   Other:        Tub/Shower Transfer:   CGA  Device Used:    [x]   Shower Bench      []   Shower Chair      []   Tub Transfer Bench           []   Bathtub    []   Shower         []   Other:         Bed Mobility:           [x]   Pt received out of bed       Transfers:    Sit--> Stand:  SUP/CGA  Stand --> Sit:   CGA  Stand-Pivot:   CGA  Other:    Assistive device required for transfer:   gait belt      Functional Mobility:    Assistance:  CGA/SUP  Device:   []   Rolling Walker     []   Standard Walker []   Wheelchair        []   Cane       []   4-Wheeled Walker         []   Cardiac Walker       [x]   Other:  none      Additional Therapeutic activities/exercises completed this date:     [x]   ADL Training   [x]   Balance/Postural training     [x]   Bed/Transfer Training   [x]   Endurance Training   []   Neuromuscular Re-ed   []   Nu-step:  Time:        Level:         #Steps:       []   Rebounder:    []  Seated     []  Standing        []   Supine Ther Ex (reps/sets):     [x]   Seated Ther Ex (reps/sets):   Pt participated in multiple sets of cardiac exercises consisting of: Shoulder shrugs, Shoulder circles, full trunk twists, lateral leaning, forward arm raises, side arm raises, & shoulder extensions while abiding cardiac precautions. Pt required minimal rest breaks throughout exercise    []   Standing Ther Ex (reps/sets):    [x]   Other: Pt performed item retrieval unsupported reaching inside base of support ipsilaterally to retrieve pants from closet to increase stand tolerance/balance in stance when performing Self Care tasks.    Pt completed therapeutic activity x  multiple  sit<>stands while

## 2024-02-10 NOTE — PROGRESS NOTES
Physical Therapy  [x] daily progress note       [] discharge       Patient Name:  Jose Figueroa   :  1965 MRN: [id number]  Room:  [address] [place] [address] Date of Admission: 2024  Rehabilitation Diagnosis:   Atherosclerotic heart disease of native coronary artery with other forms of angina pectoris [I25.118]  CHF following cardiac surgery, postop [I97.130]       Date 2/10/2024       Day of ARU Week:  5   Time IN/OUT 1230/1330   Individual Tx Minutes 60   Group Tx Minutes    Co-Treat Minutes    Concurrent Tx Minutes    TOTAL Tx Time Mins 60   Variance Time    Variance Time []   Refusal due to:     []   Medical hold/reason:    []   Illness   []   Off Unit for test/procedure  []   Extra time needed to complete task  []   Therapeutic need  []   Other (specify):   Restrictions Restrictions/Precautions  Restrictions/Precautions: ROM Restrictions, Fall Risk, Surgical Protocols  Position Activity Restriction  Sternal Precautions: No Pushing, No Pulling, 5# Lifting Restrictions  Other position/activity restrictions: IV access LUE   Interdisciplinary communication [x]   Cleared for therapy per nursing     []   RN notified about issues during session  []   RN updated on pt performance  []   Spoke with   []   Spoke with OT  []   Spoke with MD  []   Other:    Subjective observations and cognitive status: Pt in recliner, agreeable to therapy     Pain level/location:   0-3 /10       Location: incisional at rest and in activity   Discharge recommendations  Anticipated discharge date:    Destination: []home alone   []home alone with assist PRN     [x] home w/ family      [] Continuous supervision  []SNF    [] Assisted living     [] Other:  Continued therapy: [x]HHC PT  []OUTPATIENT  PT   [] No Further PT  []SNF PT  Caregiver training recommended: []Yes  [] No   Equipment needs: none likely     Bed Mobility:           [x]   Pt received out of bed         Transfers:    Sit--> Stand:  mod I, pt will at times use  Proper use of assistive device/adaptive equipment  [x]   Stair training/Advanced mobility safety and technique  [x]   Reinforced patient's precautions/mobility while maintaining precautions-reviewed precautions at start of session and rationale. Pt able to verbalize all correctly. Gave instruction at beginning of session to be aware of precautions in all situations during the session. Good carryover  []   Postural awareness  []   Family/caregiver training  []   Progress was updated and reviewed in Re[facility]er with patient and/or family this date.  []   Other:      Treatment Plan for Next Session: QI for discharge on 2/13, finalize HEP      Assessment:    Assessment:  This pt demonstrated a positive   response to today's treatment as evidenced by progressing to independence in some skills.  The patient is making  progress toward established goals as evidenced by QI scores.     Treatment/Activity Tolerance:   [x] Tolerated treatment with no adverse effects    [] Patient limited by fatigue  [] Patient limited by pain   [] Patient limited by medical complications:    [] Adverse reaction to Tx:   [] Significant change in status    Barrier/s to progress/learning:   []   None  []   Cognition  []   Hearing deficit  []   Pre-morbid mental/psychological status   []   Motivation  []   Communication  []   Anxiety  []   Vision deficit  [x]   Attention  []   Other:      Safety:       []  bed alarm set    []  chair alarm set    [x]  Pt refused alarms                []  Telesitter activated      [x]  Gait belt used during tx session      []other:         Number of Minutes/Billable Intervention  Gait Training 30   Therapeutic Exercise    Neuro Re-Ed    Therapeutic Activity 30   Wheelchair Propulsion    Group    Other:    TOTAL 60         Social History  Social/Functional History  Lives With: Son (son: \"Ash\" (22 y.o; stays at home all the time currently))  Type of Home: Trailer  Home Access: Stairs to enter without rails  Entrance

## 2024-02-10 NOTE — PROGRESS NOTES
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  No nausea, chills or new cough.    Prior (baseline) level of function: Independent.    Current level of function:         Current  IRF-RUBEN and Goals:   Occupational Therapy:    Short Term Goals  Time Frame for Short Term Goals: STGs=LTGs :   Long Term Goals  Time Frame for Long Term Goals : 7 days or until d/c.  Long Term Goal 1: Pt will complete oral hygiene and grooming tasks with IND.  Long Term Goal 2: Pt will complete total body bathing with AE PRN and Mod I.  Long Term Goal 3: Pt will complete UB dressing with IND.  Long Term Goal 4: Pt will complete LB dressing with AE PRN and Mod I.  Long Term Goal 5: Pt will doff/don footwear with AE PRN and Mod I.  Additional Goals?: Yes  Long Term Goal 6: Pt will complete toileting with Mod I.  Long Term Goal 7: Pt will complete functional transfers (bed, chair, shower, toilet) with DME PRN and Mod I.  Long Term Goal 8: Pt will perform therex/therax to facilitate an increase in strength/endurance with emphasis on dynamic standing balance/tolerance > 8 mins with Mod I.  Long Term Goal 9: Pt will perform an IADL with Mod I. :                                       Eating: Eating  Assistance Needed: Independent  Comment: Per Pt report, able to open packages/containers to eat meals IND.  CARE Score: 6  Discharge Goal: Independent       Oral Hygiene: Oral Hygiene  Assistance Needed: Supervision or touching assistance  Comment: CG/SBA in stance to  chair with his legs elevated.  Alert.  Talkative.  In fair spirits.    HEENT: Neck supple.  No JVD.  MMM.    Pulmonary: Unlabored respirations without coughing or wheezing.    Cardiac: Sternal incision is well-approximated and dry.  Regular rate and rhythm noted.    Abdomen: Patient's abdomen is soft and nondistended.  Bowel sounds were present throughout.  There was no rebound, guarding or masses noted.    Upper extremities: He was able to bring both hands together in the midline.  Poor dexterity on the right.  No new bruising or swelling.    Lower extremities: Clumsy movements at the right knee and ankle.  No signs of DVT.  Trace edema bilaterally.  Heels clear.    Sitting balance was good.  Standing balance was fair-.    Lab Results   Component Value Date    WBC 7.7 02/07/2024    HGB 10.7 (L) 02/07/2024    HCT 32.7 (L) 02/07/2024    MCV 86.3 02/07/2024     02/07/2024     Lab Results   Component Value Date    INR 1.0 02/06/2024    INR 1.1 02/05/2024    INR 1.1 02/04/2024    PROTIME 13.6 02/06/2024    PROTIME 14.4 02/05/2024    PROTIME 13.9 02/04/2024     Lab Results   Component Value Date    CREATININE 1.0 02/07/2024    BUN 11 02/07/2024     02/07/2024    K 4.6 02/07/2024     02/07/2024    CO2 27 02/07/2024     Lab Results   Component Value Date    ALT 14 01/22/2024    AST 10 (L) 01/22/2024    ALKPHOS 117 01/22/2024    BILITOT 0.4 01/22/2024       Expected length of stay  prior to a supervised level of function for discharge home with a walker and C OT/PT/RN is 2/13/2024.    Recommendations:    CHF following cardiac surgery with gait disturbance: He is demonstrating benefit from participating in the activities of his daily occupational and physical therapy.  With his persistent pain, chronic right-sided weakness and unfamiliarity with sternal precautions, he remains at some risk for fall with injury during standing ADLs and mobility activities.  Continue providing him adaptive equipment

## 2024-02-11 VITALS
SYSTOLIC BLOOD PRESSURE: 138 MMHG | TEMPERATURE: 97.9 F | RESPIRATION RATE: 18 BRPM | OXYGEN SATURATION: 97 % | HEART RATE: 79 BPM | WEIGHT: 257.5 LBS | BODY MASS INDEX: 38.14 KG/M2 | DIASTOLIC BLOOD PRESSURE: 96 MMHG | HEIGHT: 69 IN

## 2024-02-11 LAB
GLUCOSE BLD-MCNC: 120 MG/DL (ref 70–99)
GLUCOSE BLD-MCNC: 139 MG/DL (ref 70–99)
GLUCOSE BLD-MCNC: 166 MG/DL (ref 70–99)
GLUCOSE BLD-MCNC: 80 MG/DL (ref 70–99)

## 2024-02-11 PROCEDURE — 6370000000 HC RX 637 (ALT 250 FOR IP): Performed by: PHYSICAL MEDICINE & REHABILITATION

## 2024-02-11 PROCEDURE — 94664 DEMO&/EVAL PT USE INHALER: CPT

## 2024-02-11 PROCEDURE — 6360000002 HC RX W HCPCS: Performed by: PHYSICIAN ASSISTANT

## 2024-02-11 PROCEDURE — 94761 N-INVAS EAR/PLS OXIMETRY MLT: CPT

## 2024-02-11 PROCEDURE — 6370000000 HC RX 637 (ALT 250 FOR IP): Performed by: PHYSICIAN ASSISTANT

## 2024-02-11 PROCEDURE — 82962 GLUCOSE BLOOD TEST: CPT

## 2024-02-11 PROCEDURE — 2580000003 HC RX 258: Performed by: PHYSICIAN ASSISTANT

## 2024-02-11 PROCEDURE — 1280000000 HC REHAB R&B

## 2024-02-11 PROCEDURE — 94150 VITAL CAPACITY TEST: CPT

## 2024-02-11 RX ORDER — FUROSEMIDE 20 MG/1
20 TABLET ORAL DAILY
Status: DISCONTINUED | OUTPATIENT
Start: 2024-02-12 | End: 2024-02-13 | Stop reason: HOSPADM

## 2024-02-11 RX ADMIN — ENOXAPARIN SODIUM 30 MG: 100 INJECTION SUBCUTANEOUS at 08:39

## 2024-02-11 RX ADMIN — GABAPENTIN 300 MG: 300 CAPSULE ORAL at 13:52

## 2024-02-11 RX ADMIN — BACITRACIN: 500 OINTMENT TOPICAL at 08:40

## 2024-02-11 RX ADMIN — ACETAMINOPHEN 650 MG: 325 TABLET ORAL at 17:27

## 2024-02-11 RX ADMIN — ASPIRIN 81 MG: 81 TABLET, CHEWABLE ORAL at 08:40

## 2024-02-11 RX ADMIN — ACETAMINOPHEN 650 MG: 325 TABLET ORAL at 22:43

## 2024-02-11 RX ADMIN — POTASSIUM CHLORIDE 10 MEQ: 1500 TABLET, EXTENDED RELEASE ORAL at 08:40

## 2024-02-11 RX ADMIN — CLOPIDOGREL BISULFATE 75 MG: 75 TABLET ORAL at 08:40

## 2024-02-11 RX ADMIN — ACETAMINOPHEN 650 MG: 325 TABLET ORAL at 05:29

## 2024-02-11 RX ADMIN — GABAPENTIN 300 MG: 300 CAPSULE ORAL at 08:40

## 2024-02-11 RX ADMIN — SERTRALINE HYDROCHLORIDE 100 MG: 50 TABLET, FILM COATED ORAL at 08:39

## 2024-02-11 RX ADMIN — ENALAPRIL MALEATE 5 MG: 5 TABLET ORAL at 08:40

## 2024-02-11 RX ADMIN — ACETAMINOPHEN 650 MG: 325 TABLET ORAL at 10:53

## 2024-02-11 RX ADMIN — METOPROLOL TARTRATE 37.5 MG: 25 TABLET, FILM COATED ORAL at 08:38

## 2024-02-11 RX ADMIN — METOPROLOL TARTRATE 37.5 MG: 25 TABLET, FILM COATED ORAL at 22:43

## 2024-02-11 RX ADMIN — INSULIN GLARGINE 14 UNITS: 100 INJECTION, SOLUTION SUBCUTANEOUS at 08:37

## 2024-02-11 RX ADMIN — TRAZODONE HYDROCHLORIDE 50 MG: 50 TABLET ORAL at 22:43

## 2024-02-11 RX ADMIN — SODIUM CHLORIDE, PRESERVATIVE FREE 10 ML: 5 INJECTION INTRAVENOUS at 22:51

## 2024-02-11 RX ADMIN — FAMOTIDINE 20 MG: 20 TABLET ORAL at 22:43

## 2024-02-11 RX ADMIN — FUROSEMIDE 20 MG: 10 INJECTION, SOLUTION INTRAMUSCULAR; INTRAVENOUS at 08:40

## 2024-02-11 RX ADMIN — ATORVASTATIN CALCIUM 40 MG: 40 TABLET, FILM COATED ORAL at 22:43

## 2024-02-11 RX ADMIN — GABAPENTIN 300 MG: 300 CAPSULE ORAL at 22:43

## 2024-02-11 RX ADMIN — SENNOSIDES, DOCUSATE SODIUM 1 TABLET: 8.6; 5 TABLET ORAL at 08:39

## 2024-02-11 RX ADMIN — POLYETHYLENE GLYCOL (3350) 17 G: 17 POWDER, FOR SOLUTION ORAL at 08:38

## 2024-02-11 RX ADMIN — SENNOSIDES, DOCUSATE SODIUM 1 TABLET: 8.6; 5 TABLET ORAL at 22:43

## 2024-02-11 RX ADMIN — METFORMIN HYDROCHLORIDE 500 MG: 500 TABLET, FILM COATED ORAL at 17:27

## 2024-02-11 RX ADMIN — Medication 1 TABLET: at 08:40

## 2024-02-11 RX ADMIN — SODIUM CHLORIDE, PRESERVATIVE FREE 10 ML: 5 INJECTION INTRAVENOUS at 08:39

## 2024-02-11 RX ADMIN — BACITRACIN: 500 OINTMENT TOPICAL at 22:51

## 2024-02-11 RX ADMIN — ENOXAPARIN SODIUM 30 MG: 100 INJECTION SUBCUTANEOUS at 22:44

## 2024-02-11 RX ADMIN — FAMOTIDINE 20 MG: 20 TABLET ORAL at 08:39

## 2024-02-11 ASSESSMENT — PAIN DESCRIPTION - LOCATION
LOCATION: HEAD
LOCATION: HEAD

## 2024-02-11 ASSESSMENT — PAIN DESCRIPTION - FREQUENCY
FREQUENCY: INTERMITTENT
FREQUENCY: INTERMITTENT

## 2024-02-11 ASSESSMENT — PAIN SCALES - GENERAL
PAINLEVEL_OUTOF10: 6
PAINLEVEL_OUTOF10: 0
PAINLEVEL_OUTOF10: 3
PAINLEVEL_OUTOF10: 0

## 2024-02-11 ASSESSMENT — PAIN DESCRIPTION - ONSET
ONSET: PROGRESSIVE
ONSET: PROGRESSIVE

## 2024-02-11 ASSESSMENT — PAIN DESCRIPTION - ORIENTATION
ORIENTATION: MID
ORIENTATION: MID

## 2024-02-11 ASSESSMENT — PAIN DESCRIPTION - DIRECTION
RADIATING_TOWARDS: NECK
RADIATING_TOWARDS: NECK

## 2024-02-11 ASSESSMENT — PAIN DESCRIPTION - PAIN TYPE
TYPE: ACUTE PAIN
TYPE: ACUTE PAIN

## 2024-02-11 ASSESSMENT — PAIN DESCRIPTION - DESCRIPTORS
DESCRIPTORS: ACHING
DESCRIPTORS: ACHING

## 2024-02-11 NOTE — PLAN OF CARE
Problem: Discharge Planning  Goal: Discharge to home or other facility with appropriate resources  Outcome: Progressing  Flowsheets (Taken 2/11/2024 0800)  Discharge to home or other facility with appropriate resources:   Identify barriers to discharge with patient and caregiver   Arrange for needed discharge resources and transportation as appropriate   Identify discharge learning needs (meds, wound care, etc)   Arrange for interpreters to assist at discharge as needed   Refer to discharge planning if patient needs post-hospital services based on physician order or complex needs related to functional status, cognitive ability or social support system     Problem: Safety - Adult  Goal: Free from fall injury  Outcome: Progressing     Problem: ABCDS Injury Assessment  Goal: Absence of physical injury  Outcome: Progressing     Problem: Pain  Goal: Verbalizes/displays adequate comfort level or baseline comfort level  Outcome: Progressing

## 2024-02-12 ENCOUNTER — TELEPHONE (OUTPATIENT)
Dept: CARDIOTHORACIC SURGERY | Age: 59
End: 2024-02-12

## 2024-02-12 LAB
ANION GAP SERPL CALCULATED.3IONS-SCNC: 14 MMOL/L (ref 7–16)
BUN SERPL-MCNC: 13 MG/DL (ref 6–23)
CALCIUM SERPL-MCNC: 8.4 MG/DL (ref 8.3–10.6)
CHLORIDE BLD-SCNC: 103 MMOL/L (ref 99–110)
CO2: 23 MMOL/L (ref 21–32)
CREAT SERPL-MCNC: 1 MG/DL (ref 0.9–1.3)
GFR SERPL CREATININE-BSD FRML MDRD: >60 ML/MIN/1.73M2
GLUCOSE BLD-MCNC: 106 MG/DL (ref 70–99)
GLUCOSE BLD-MCNC: 130 MG/DL (ref 70–99)
GLUCOSE BLD-MCNC: 94 MG/DL (ref 70–99)
GLUCOSE SERPL-MCNC: 123 MG/DL (ref 70–99)
HCT VFR BLD CALC: 32.8 % (ref 42–52)
HEMOGLOBIN: 10.5 GM/DL (ref 13.5–18)
MCH RBC QN AUTO: 27.9 PG (ref 27–31)
MCHC RBC AUTO-ENTMCNC: 32 % (ref 32–36)
MCV RBC AUTO: 87 FL (ref 78–100)
PDW BLD-RTO: 12.6 % (ref 11.7–14.9)
PLATELET # BLD: 379 K/CU MM (ref 140–440)
PMV BLD AUTO: 8.5 FL (ref 7.5–11.1)
POTASSIUM SERPL-SCNC: 4.8 MMOL/L (ref 3.5–5.1)
RBC # BLD: 3.77 M/CU MM (ref 4.6–6.2)
SODIUM BLD-SCNC: 140 MMOL/L (ref 135–145)
WBC # BLD: 8.2 K/CU MM (ref 4–10.5)

## 2024-02-12 PROCEDURE — 97530 THERAPEUTIC ACTIVITIES: CPT

## 2024-02-12 PROCEDURE — 97110 THERAPEUTIC EXERCISES: CPT

## 2024-02-12 PROCEDURE — 6370000000 HC RX 637 (ALT 250 FOR IP): Performed by: PHYSICAL MEDICINE & REHABILITATION

## 2024-02-12 PROCEDURE — 94761 N-INVAS EAR/PLS OXIMETRY MLT: CPT

## 2024-02-12 PROCEDURE — 36415 COLL VENOUS BLD VENIPUNCTURE: CPT

## 2024-02-12 PROCEDURE — 1280000000 HC REHAB R&B

## 2024-02-12 PROCEDURE — 82962 GLUCOSE BLOOD TEST: CPT

## 2024-02-12 PROCEDURE — 94150 VITAL CAPACITY TEST: CPT

## 2024-02-12 PROCEDURE — 80048 BASIC METABOLIC PNL TOTAL CA: CPT

## 2024-02-12 PROCEDURE — 94664 DEMO&/EVAL PT USE INHALER: CPT

## 2024-02-12 PROCEDURE — 97535 SELF CARE MNGMENT TRAINING: CPT

## 2024-02-12 PROCEDURE — 97116 GAIT TRAINING THERAPY: CPT

## 2024-02-12 PROCEDURE — 6360000002 HC RX W HCPCS: Performed by: PHYSICIAN ASSISTANT

## 2024-02-12 PROCEDURE — 85027 COMPLETE CBC AUTOMATED: CPT

## 2024-02-12 PROCEDURE — 6370000000 HC RX 637 (ALT 250 FOR IP): Performed by: PHYSICIAN ASSISTANT

## 2024-02-12 PROCEDURE — 99232 SBSQ HOSP IP/OBS MODERATE 35: CPT | Performed by: PHYSICAL MEDICINE & REHABILITATION

## 2024-02-12 RX ORDER — METOPROLOL TARTRATE 37.5 MG/1
37.5 TABLET, FILM COATED ORAL 2 TIMES DAILY
Qty: 60 TABLET | Refills: 0 | Status: SHIPPED | OUTPATIENT
Start: 2024-02-12

## 2024-02-12 RX ORDER — SENNA AND DOCUSATE SODIUM 50; 8.6 MG/1; MG/1
1 TABLET, FILM COATED ORAL 2 TIMES DAILY
COMMUNITY
Start: 2024-02-12

## 2024-02-12 RX ORDER — GABAPENTIN 300 MG/1
300 CAPSULE ORAL 3 TIMES DAILY
Qty: 40 CAPSULE | Refills: 0 | Status: SHIPPED | OUTPATIENT
Start: 2024-02-12 | End: 2024-02-26

## 2024-02-12 RX ORDER — M-VIT,TX,IRON,MINS/CALC/FOLIC 27MG-0.4MG
1 TABLET ORAL
COMMUNITY
Start: 2024-02-13

## 2024-02-12 RX ORDER — FUROSEMIDE 20 MG/1
20 TABLET ORAL DAILY
Qty: 30 TABLET | Refills: 0 | Status: SHIPPED | OUTPATIENT
Start: 2024-02-13 | End: 2024-02-13 | Stop reason: SDUPTHER

## 2024-02-12 RX ORDER — SERTRALINE HYDROCHLORIDE 100 MG/1
100 TABLET, FILM COATED ORAL DAILY
Qty: 30 TABLET | Refills: 0 | Status: SHIPPED | OUTPATIENT
Start: 2024-02-12

## 2024-02-12 RX ORDER — POLYETHYLENE GLYCOL 3350 17 G/17G
17 POWDER, FOR SOLUTION ORAL DAILY
Qty: 527 G | Refills: 0 | COMMUNITY
Start: 2024-02-13 | End: 2024-03-14

## 2024-02-12 RX ORDER — ENALAPRIL MALEATE 5 MG/1
5 TABLET ORAL DAILY
Qty: 30 TABLET | Refills: 0 | Status: SHIPPED | OUTPATIENT
Start: 2024-02-13

## 2024-02-12 RX ORDER — POTASSIUM CHLORIDE 750 MG/1
10 TABLET, EXTENDED RELEASE ORAL
Qty: 30 TABLET | Refills: 0 | Status: SHIPPED | OUTPATIENT
Start: 2024-02-13 | End: 2024-02-13

## 2024-02-12 RX ORDER — ASPIRIN 81 MG/1
81 TABLET, CHEWABLE ORAL DAILY
Qty: 30 TABLET | Refills: 1 | COMMUNITY
Start: 2024-02-13

## 2024-02-12 RX ADMIN — BACITRACIN: 500 OINTMENT TOPICAL at 21:26

## 2024-02-12 RX ADMIN — FAMOTIDINE 20 MG: 20 TABLET ORAL at 21:26

## 2024-02-12 RX ADMIN — ACETAMINOPHEN 650 MG: 325 TABLET ORAL at 09:25

## 2024-02-12 RX ADMIN — METOPROLOL TARTRATE 37.5 MG: 25 TABLET, FILM COATED ORAL at 09:24

## 2024-02-12 RX ADMIN — ENOXAPARIN SODIUM 30 MG: 100 INJECTION SUBCUTANEOUS at 09:24

## 2024-02-12 RX ADMIN — SENNOSIDES, DOCUSATE SODIUM 1 TABLET: 8.6; 5 TABLET ORAL at 21:26

## 2024-02-12 RX ADMIN — ENALAPRIL MALEATE 5 MG: 5 TABLET ORAL at 09:27

## 2024-02-12 RX ADMIN — SERTRALINE HYDROCHLORIDE 100 MG: 50 TABLET, FILM COATED ORAL at 09:25

## 2024-02-12 RX ADMIN — ASPIRIN 81 MG: 81 TABLET, CHEWABLE ORAL at 09:25

## 2024-02-12 RX ADMIN — ACETAMINOPHEN 650 MG: 325 TABLET ORAL at 06:04

## 2024-02-12 RX ADMIN — TRAZODONE HYDROCHLORIDE 50 MG: 50 TABLET ORAL at 21:32

## 2024-02-12 RX ADMIN — ENOXAPARIN SODIUM 30 MG: 100 INJECTION SUBCUTANEOUS at 21:29

## 2024-02-12 RX ADMIN — ATORVASTATIN CALCIUM 40 MG: 40 TABLET, FILM COATED ORAL at 21:26

## 2024-02-12 RX ADMIN — METFORMIN HYDROCHLORIDE 500 MG: 500 TABLET, FILM COATED ORAL at 09:25

## 2024-02-12 RX ADMIN — GABAPENTIN 300 MG: 300 CAPSULE ORAL at 21:26

## 2024-02-12 RX ADMIN — FUROSEMIDE 20 MG: 20 TABLET ORAL at 09:25

## 2024-02-12 RX ADMIN — ACETAMINOPHEN 650 MG: 325 TABLET ORAL at 18:19

## 2024-02-12 RX ADMIN — POTASSIUM CHLORIDE 10 MEQ: 1500 TABLET, EXTENDED RELEASE ORAL at 09:24

## 2024-02-12 RX ADMIN — FAMOTIDINE 20 MG: 20 TABLET ORAL at 09:25

## 2024-02-12 RX ADMIN — METFORMIN HYDROCHLORIDE 500 MG: 500 TABLET, FILM COATED ORAL at 18:19

## 2024-02-12 RX ADMIN — ACETAMINOPHEN 650 MG: 325 TABLET ORAL at 21:26

## 2024-02-12 RX ADMIN — METOPROLOL TARTRATE 37.5 MG: 25 TABLET, FILM COATED ORAL at 21:26

## 2024-02-12 RX ADMIN — GABAPENTIN 300 MG: 300 CAPSULE ORAL at 15:59

## 2024-02-12 RX ADMIN — CLOPIDOGREL BISULFATE 75 MG: 75 TABLET ORAL at 09:25

## 2024-02-12 RX ADMIN — Medication 1 TABLET: at 09:25

## 2024-02-12 RX ADMIN — GABAPENTIN 300 MG: 300 CAPSULE ORAL at 09:26

## 2024-02-12 NOTE — TELEPHONE ENCOUNTER
Spoke to ARU nurse, asking if patient could get CXR and labs done while in hospital before discharge tomorrow 2/13/24. Nurse stated that she would ask Dr. Rendon and get labs and CXR taken care of.

## 2024-02-12 NOTE — FLOWSHEET NOTE
[x] daily progress note       [x] discharge       Patient Name:  Jose Figueroa   :  1965 MRN: [id number]  Room:  [address] [place] [address] Date of Admission: 2024  Rehabilitation Diagnosis:   Atherosclerotic heart disease of native coronary artery with other forms of angina pectoris [I25.118]  CHF following cardiac surgery, postop [I97.130]       Date 2024       Day of ARU Week:  7   Time IN/OUT 7129-7785  4644-3917   Individual Tx Minutes 120   TOTAL Tx Time Mins 120   Restrictions Restrictions/Precautions  Restrictions/Precautions: ROM Restrictions, Fall Risk, Surgical Protocols  Position Activity Restriction  Sternal Precautions: No Pushing, No Pulling, 5# Lifting Restrictions  Other position/activity restrictions: IV access LUE   Communication with other providers: [x]   OK to see per nursing:     []   Spoke with team member regarding:      Subjective observations and cognitive status: Pt seen sitting up in recliner at beginning of treatment. Agreeable to therapy.    Pain level/location:    8/10       Location: headache   Discharge recommendations  Anticipated discharge date:  2024  Destination: []home alone   []home alone with assist PRN     [x] home w/ family      [] Continuous supervision  []SNF    [] Assisted living     [] Other:  Continued therapy: [x]HHC PT  []OUTPATIENT  PT   [] No Further PT  []SNF PT  Caregiver training recommended: []Yes  [] No   Equipment needs: none likely     Bed Mobility:           [x]   Pt received out of bed   Rolling R/L:  Independent   Scooting:  Independent   Lying --> Sit:  Independent   Sit --> lying:  Independent   Pt practiced in regular, flat bed. Pt followed sternal precautions.     Transfers:    Sit--> Stand:  Independent   Stand --> Sit:   Independent   Chair-->Bed/Bed --> Chair:   Independent   Car Transfers:  Independent   Assistive device required for transfer:   No device  Pt followed sternal precautions.     Gait:    Walk 10 feet:  Independent   Walk 50

## 2024-02-12 NOTE — PROGRESS NOTES
Physical Rehabilitation: OCCUPATIONAL THERAPY     [x] daily progress note       [] discharge       Patient Name:  Jose Figueroa   :  1965 MRN: [id number]  Room:  [address] [place] [address] Date of Admission: 2024  Rehabilitation Diagnosis:   Atherosclerotic heart disease of native coronary artery with other forms of angina pectoris [I25.118]  CHF following cardiac surgery, postop [I97.130]       Date 2024       Day of ARU Week:  7   Time IN//725   Individual Tx Minutes 60   Group Tx Minutes    Co-Treat Minutes    Concurrent Tx Minutes    TOTAL Tx Time Mins 60   Variance Time    Variance Reason []   Refusal due to:     []   Medical hold/reason:    []   Illness   []   Off Unit for test/procedure  []   Extra time needed to complete task  []   Therapeutic need  []   Make up mins were attempted but pt unable to complete due to (specify)  []   Other (specify):   Restrictions Restrictions/Precautions: ROM Restrictions, Fall Risk, Surgical Protocols         Communication with other providers: [x]   OK to see per nursing:     []   Spoke with team member regarding:      Subjective observations and cognitive status: Patient lying in supine upon approach watching tv, pleasant and agreeable to therapy      Pain level/location:    /10       Location: per patient no pain noted     Discharge recommendations  Anticipated discharge date:    Destination: []home alone   []home alone w assist prn   [x] home w/ family    [] Continuous supervision       []SNF    [] Assisted living     [] Other:   Continued therapy: [x]HHC OT  []OUTPATIENT  OT   [] No Further OT  Equipment needs: None        ADLs:    Eating: Eating  Assistance Needed: Independent  Comment: X  CARE Score: 6  Discharge Goal: Independent       Oral Hygiene: Oral Hygiene  Assistance Needed: Independent  Comment: X  CARE Score: 6  Discharge Goal: Independent    UB/LB Bathing: Shower/Bathe Self  Assistance Needed: Independent  Comment: X  CARE Score: 6  Discharge

## 2024-02-12 NOTE — CARE COORDINATION
Left VM for PCP office to determine if patient is active.      Received a call back and patient is active with PCP.    Referral sent to Kimberly @ [facility].  They're out of network/not in service area.    Patient has surgical follow up previously scheduled for 2/13/24 @1015. Spoke with Dr Rendon and patient will dc prior to this appointment.  Tee RN mgr, updated.    Case mgt met with patient re: discharge and tomorrow's follow up.  He states is \"ex\" will provide transport.  Patient provided with appointment details.    Patient referred to [facility]    1415: [facility] can't staff.

## 2024-02-13 ENCOUNTER — HOSPITAL ENCOUNTER (OUTPATIENT)
Dept: GENERAL RADIOLOGY | Age: 59
Discharge: HOME | DRG: 166 | End: 2024-02-13
Payer: COMMERCIAL

## 2024-02-13 ENCOUNTER — OFFICE VISIT (OUTPATIENT)
Dept: CARDIOTHORACIC SURGERY | Age: 59
End: 2024-02-13

## 2024-02-13 ENCOUNTER — HOSPITAL ENCOUNTER (OUTPATIENT)
Age: 59
Discharge: HOME | DRG: 166 | End: 2024-02-13
Payer: COMMERCIAL

## 2024-02-13 VITALS
RESPIRATION RATE: 18 BRPM | HEIGHT: 69 IN | SYSTOLIC BLOOD PRESSURE: 134 MMHG | DIASTOLIC BLOOD PRESSURE: 82 MMHG | WEIGHT: 255.29 LBS | HEART RATE: 82 BPM | BODY MASS INDEX: 37.81 KG/M2 | OXYGEN SATURATION: 96 % | TEMPERATURE: 98.1 F

## 2024-02-13 VITALS
HEART RATE: 71 BPM | DIASTOLIC BLOOD PRESSURE: 82 MMHG | BODY MASS INDEX: 38.33 KG/M2 | HEIGHT: 69 IN | WEIGHT: 258.8 LBS | SYSTOLIC BLOOD PRESSURE: 132 MMHG | OXYGEN SATURATION: 97 %

## 2024-02-13 DIAGNOSIS — Z95.1 S/P CABG (CORONARY ARTERY BYPASS GRAFT): ICD-10-CM

## 2024-02-13 DIAGNOSIS — Z95.1 S/P CABG (CORONARY ARTERY BYPASS GRAFT): Primary | ICD-10-CM

## 2024-02-13 DIAGNOSIS — Z01.818 PREOP EXAMINATION: Primary | ICD-10-CM

## 2024-02-13 PROCEDURE — 99024 POSTOP FOLLOW-UP VISIT: CPT | Performed by: THORACIC SURGERY (CARDIOTHORACIC VASCULAR SURGERY)

## 2024-02-13 PROCEDURE — 94761 N-INVAS EAR/PLS OXIMETRY MLT: CPT

## 2024-02-13 PROCEDURE — 6370000000 HC RX 637 (ALT 250 FOR IP): Performed by: PHYSICIAN ASSISTANT

## 2024-02-13 PROCEDURE — 71046 X-RAY EXAM CHEST 2 VIEWS: CPT

## 2024-02-13 PROCEDURE — 6370000000 HC RX 637 (ALT 250 FOR IP): Performed by: PHYSICAL MEDICINE & REHABILITATION

## 2024-02-13 RX ORDER — FUROSEMIDE 20 MG/1
40 TABLET ORAL DAILY
Qty: 14 TABLET | Refills: 0 | Status: SHIPPED | OUTPATIENT
Start: 2024-02-13 | End: 2024-02-20

## 2024-02-13 RX ORDER — POTASSIUM CHLORIDE 750 MG/1
20 TABLET, EXTENDED RELEASE ORAL
Qty: 14 TABLET | Refills: 0 | Status: SHIPPED | OUTPATIENT
Start: 2024-02-13 | End: 2024-02-20

## 2024-02-13 RX ADMIN — ENALAPRIL MALEATE 5 MG: 5 TABLET ORAL at 09:40

## 2024-02-13 RX ADMIN — FUROSEMIDE 20 MG: 20 TABLET ORAL at 09:33

## 2024-02-13 RX ADMIN — SERTRALINE HYDROCHLORIDE 100 MG: 50 TABLET, FILM COATED ORAL at 09:32

## 2024-02-13 RX ADMIN — FAMOTIDINE 20 MG: 20 TABLET ORAL at 09:32

## 2024-02-13 RX ADMIN — ASPIRIN 81 MG: 81 TABLET, CHEWABLE ORAL at 09:32

## 2024-02-13 RX ADMIN — POTASSIUM CHLORIDE 10 MEQ: 1500 TABLET, EXTENDED RELEASE ORAL at 09:32

## 2024-02-13 RX ADMIN — GABAPENTIN 300 MG: 300 CAPSULE ORAL at 09:32

## 2024-02-13 RX ADMIN — Medication 1 TABLET: at 09:32

## 2024-02-13 RX ADMIN — CLOPIDOGREL BISULFATE 75 MG: 75 TABLET ORAL at 09:32

## 2024-02-13 RX ADMIN — METOPROLOL TARTRATE 37.5 MG: 25 TABLET, FILM COATED ORAL at 09:32

## 2024-02-13 RX ADMIN — METFORMIN HYDROCHLORIDE 500 MG: 500 TABLET, FILM COATED ORAL at 09:32

## 2024-02-13 RX ADMIN — POLYETHYLENE GLYCOL (3350) 17 G: 17 POWDER, FOR SOLUTION ORAL at 09:33

## 2024-02-13 RX ADMIN — ACETAMINOPHEN 650 MG: 325 TABLET ORAL at 05:53

## 2024-02-13 NOTE — CARE COORDINATION
ARU  Discharge Summary    D/C Date: 2/13/24    Patient discharged to: home with son    Transported by: family    Referrals made to: [facility] TBD    Additional information: no DME needs    Caregiver training: none requested    Discharge BIMS completed:  [x]      IMM:   []  N/A

## 2024-02-13 NOTE — PROGRESS NOTES
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  Feeling better now.  Looking forward to discharge.  No cough or new chills.    Prior (baseline) level of function: Independent.    Current level of function:         Current  IRF-RUBEN and Goals:   Occupational Therapy:    Short Term Goals  Time Frame for Short Term Goals: STGs=LTGs :   Long Term Goals  Time Frame for Long Term Goals : 7 days or until d/c.  Long Term Goal 1: Pt will complete oral hygiene and grooming tasks with IND.  Long Term Goal 2: Pt will complete total body bathing with AE PRN and Mod I.  Long Term Goal 3: Pt will complete UB dressing with IND.  Long Term Goal 4: Pt will complete LB dressing with AE PRN and Mod I.  Long Term Goal 5: Pt will doff/don footwear with AE PRN and Mod I.  Additional Goals?: Yes  Long Term Goal 6: Pt will complete toileting with Mod I.  Long Term Goal 7: Pt will complete functional transfers (bed, chair, shower, toilet) with DME PRN and Mod I.  Long Term Goal 8: Pt will perform therex/therax to facilitate an increase in strength/endurance with emphasis on dynamic standing balance/tolerance > 8 mins with Mod I.  Long Term Goal 9: Pt will perform an IADL with Mod I. :                                       Eating: Eating  Assistance Needed: Independent  Comment: X  CARE Score: 6  Discharge Goal: Independent       Oral Hygiene: Oral Hygiene  Assistance Needed: Independent  Comment: X  CARE Score:

## 2024-02-13 NOTE — PROGRESS NOTES
noticed.  Or the opposite- being so fidgety or restless that you have been moving around a lot more than usual.   [] 0.  No  [] 1.  Yes  [] 9. No Response [] 0.  Never or one day  [] 1.  2-6 days (several days)  [] 2.  7-11 days (half or more of days)  [] 3.  12-14 days (nearly every day   Thoughts that you would be better off dead, or of hurting yourself in some way.   [] 0.  No  [] 1.  Yes  [] 9. No Response [] 0.  Never or one day  [] 1.  2-6 days (several days)  [] 2.  7-11 days (half or more of days)  [] 3.  12-14 days (nearly every day     Social Isolation  \"How often do you feel lonely or isolated from those around you?\"  [x] 0.  Never  [] 1.  Rarely  [] 2.  Sometimes  [] 3.  Often  [] 4.  Always  [] 8.  Patient unable to respond    Pain Effect on Sleep  \"Over the past 5 days, how much of the time has pain made it hard for you to sleep at night?\"  []  0.  Does not apply - I have not had any pain or hurting in the past 5 days  []  1.  Rarely or not at all  [x]  2.  Occasionally  []  3.  Frequently  []  4.  Almost constantly  []  8.  Unable to answer  **If the patient answers \"0.  Does not apply\" to this question, skip the next two \"Pain Effect...\" questions**      Pain Interference with Therapy Activities  \"Over the past 5 days, how often have you limited your participation in rehabilitation therapy sessions due to pain?\"  []  1.  Rarely or not at all  [x]  2.  Occasionally  []  3.  Frequently  []  4.  Almost constantly  []  8.  Unable to answer    Pain Interference with Day-to-Day Activities:  \"Over the past 5 days, how often have you limited your day-to-day activities (excluding rehabilitation therapy session)?\"  []  1.  Rarely or not at all  [x]  2.  Occasionally  []  3.  Frequently  []  4.  Almost constantly  []  8.  Unable to answer    Verification that the patient's paper prescriptions were provided to the patient at time of discharge   (NEW QUESTION)  [x] Yes, the prescriptions were provided to the

## 2024-02-15 ENCOUNTER — OFFICE VISIT (OUTPATIENT)
Dept: CARDIOLOGY CLINIC | Age: 59
End: 2024-02-15
Payer: COMMERCIAL

## 2024-02-15 VITALS
OXYGEN SATURATION: 98 % | SYSTOLIC BLOOD PRESSURE: 138 MMHG | DIASTOLIC BLOOD PRESSURE: 84 MMHG | HEIGHT: 69 IN | WEIGHT: 255.6 LBS | HEART RATE: 96 BPM | BODY MASS INDEX: 37.86 KG/M2

## 2024-02-15 DIAGNOSIS — Z95.1 S/P CABG (CORONARY ARTERY BYPASS GRAFT): Primary | ICD-10-CM

## 2024-02-15 PROCEDURE — 1111F DSCHRG MED/CURRENT MED MERGE: CPT | Performed by: INTERNAL MEDICINE

## 2024-02-15 PROCEDURE — 3075F SYST BP GE 130 - 139MM HG: CPT | Performed by: INTERNAL MEDICINE

## 2024-02-15 PROCEDURE — 3079F DIAST BP 80-89 MM HG: CPT | Performed by: INTERNAL MEDICINE

## 2024-02-15 PROCEDURE — G8427 DOCREV CUR MEDS BY ELIG CLIN: HCPCS | Performed by: INTERNAL MEDICINE

## 2024-02-15 PROCEDURE — G8417 CALC BMI ABV UP PARAM F/U: HCPCS | Performed by: INTERNAL MEDICINE

## 2024-02-15 PROCEDURE — 3017F COLORECTAL CA SCREEN DOC REV: CPT | Performed by: INTERNAL MEDICINE

## 2024-02-15 PROCEDURE — G8484 FLU IMMUNIZE NO ADMIN: HCPCS | Performed by: INTERNAL MEDICINE

## 2024-02-15 PROCEDURE — 99214 OFFICE O/P EST MOD 30 MIN: CPT | Performed by: INTERNAL MEDICINE

## 2024-02-15 PROCEDURE — 1036F TOBACCO NON-USER: CPT | Performed by: INTERNAL MEDICINE

## 2024-02-15 NOTE — PROGRESS NOTES
Basil Genao MD        OFFICE  FOLLOWUP NOTE    Chief complaints: patient is here for management of CAD,s/p CABG, DM, HTN,, DYSLPIDEMIA    Subjective: patient feels better, no chest pain, no shortness of breath, no dizziness, no palpitations    EZEQUIEL Garcia is a 58 y.o.year old who  has a past medical history of CVA (cerebral vascular accident) (HCC), Diabetes mellitus (HCC), Hyperlipidemia, and Hypertension. and presents for management of CAD,s/p CABG, DM, HTN,, DYSLPIDEMIA which are well controlled      Current Outpatient Medications   Medication Sig Dispense Refill    furosemide (LASIX) 20 MG tablet Take 2 tablets by mouth daily for 7 days 14 tablet 0    potassium chloride (KLOR-CON M) 10 MEQ extended release tablet Take 2 tablets by mouth daily (with breakfast) for 7 days 14 tablet 0    aspirin 81 MG chewable tablet Take 1 tablet by mouth daily 30 tablet 1    gabapentin (NEURONTIN) 300 MG capsule Take 1 capsule by mouth 3 times daily for 40 doses. 40 capsule 0    sertraline (ZOLOFT) 100 MG tablet Take 1 tablet by mouth daily 30 tablet 0    enalapril (VASOTEC) 5 MG tablet Take 1 tablet by mouth daily 30 tablet 0    metoprolol tartrate 37.5 MG TABS Take 37.5 mg by mouth 2 times daily 60 tablet 0    polyethylene glycol (GLYCOLAX) 17 g packet Take 1 packet by mouth daily 527 g 0    sennosides-docusate sodium (SENOKOT-S) 8.6-50 MG tablet Take 1 tablet by mouth 2 times daily      Multiple Vitamins-Minerals (THERAPEUTIC MULTIVITAMIN-MINERALS) tablet Take 1 tablet by mouth daily (with breakfast)      atorvastatin (LIPITOR) 40 MG tablet Take 1 tablet by mouth nightly 30 tablet 3    clopidogrel (PLAVIX) 75 MG tablet Take 1 tablet by mouth daily 30 tablet 3    metFORMIN (GLUCOPHAGE) 500 MG tablet Take 1 tablet by mouth 2 times daily (with meals)      Blood Pressure KIT 1 each by Does not apply route daily 1 kit 0     No current facility-administered medications for this visit.     Allergies: Patient has no

## 2024-02-15 NOTE — PATIENT INSTRUCTIONS
**It is YOUR responsibilty to bring medication bottles and/or updated medication list to EACH APPOINTMENT. This will allow us to better serve you and all your healthcare needs**  Thank you for allowing us to care for you today!   We want to ensure we can follow your treatment plan and we strive to give you the best outcomes and experience possible.   If you ever have a life threatening emergency and call 911 - for an ambulance (EMS)   Our providers can only care for you at:   [facility] or [facility].   Even if you have someone take you or you drive yourself we can only care for you in a [facility]. Our providers are not setup at the other healthcare locations!   Please be informed that if you contact our office outside of normal business hours the physician on call cannot help with any scheduling or rescheduling issues, procedure instruction questions or any type of medication issue.    We advise you for any urgent/emergency that you go to the nearest emergency room!    PLEASE CALL OUR OFFICE DURING NORMAL BUSINESS HOURS    Monday - Friday   8 am to 5 pm    [place]: [phone]    [place]: [phone]    [place]:  [phone]  We are committed to providing you the best care possible.    If you receive a survey after visiting one of our offices, please take time to share your experience concerning your physician office visit.  These surveys are confidential and no health information about you is shared.    We are eager to improve for you and we are counting on your feedback to help make that happen.

## 2024-02-26 NOTE — PROGRESS NOTES
Cardiothoracic Surgery     Cardiologist: Baisl Genao MD         CC:follow up     HISTORY OF PRESENT ILLNESS:  Jose Figueroa is a 58 y.o. male who presents today for:  1. Postop followup s/p  CORONARY ARTERY BYPASS GRAFT X2, LIMA - LAD, SVG - PDA; LEFT ENDOSCOPIC GREATER SAPHENOUS VEIN HARVEST; INTRAOPERATIVE TRANSESOPHAGEAL ECHOCARDIOGRAM  on 01/29/2024    2. Pt is doing well, continues to improve, increasing activity level as tolerated - no SOB or CP noted.     PHYSICAL EXAM:   VITALS:  /70 (Site: Left Upper Arm, Position: Sitting, Cuff Size: Large Adult)   Pulse 58   Ht 1.753 m (5' 9\")   Wt 117.8 kg (259 lb 12.8 oz)   SpO2 98%   BMI 38.37 kg/m²      Physical Exam:  Gen: alert, well appearing, and in no distress   Sternotomy site well approximated, clean and dry.  No signs of erythema, swelling, or drainage. Chest tube insertion sites clean and dry.    Lung: clear to auscultation, no wheezes or rales, and unlabored breathing  Heart: S1 and S2 normal, no murmur, click, gallop or rub  Extremities: peripheral pulses normal, no pedal edema, no clubbing or cyanosis     ROS:   See pertinent ROS noted in HPI    Radiological and other results:  CXR: no pneumothorax or effusion    Assessment:  Pt continues progress well, no c/o at this time  Sternal and LE wound healing well    Plan:  Pt to f/u with cardiology on date 03/06/2024  PRN return to office

## 2024-02-27 ENCOUNTER — OFFICE VISIT (OUTPATIENT)
Dept: CARDIOTHORACIC SURGERY | Age: 59
End: 2024-02-27

## 2024-02-27 VITALS
HEART RATE: 58 BPM | HEIGHT: 69 IN | DIASTOLIC BLOOD PRESSURE: 70 MMHG | WEIGHT: 259.8 LBS | OXYGEN SATURATION: 98 % | BODY MASS INDEX: 38.48 KG/M2 | SYSTOLIC BLOOD PRESSURE: 130 MMHG

## 2024-02-27 DIAGNOSIS — I25.10 CAD, MULTIPLE VESSEL: Primary | ICD-10-CM

## 2024-02-27 PROCEDURE — 99024 POSTOP FOLLOW-UP VISIT: CPT | Performed by: THORACIC SURGERY (CARDIOTHORACIC VASCULAR SURGERY)

## 2024-02-28 RX ORDER — METOPROLOL TARTRATE 37.5 MG/1
1 TABLET, FILM COATED ORAL 2 TIMES DAILY
Qty: 42 TABLET | OUTPATIENT
Start: 2024-02-28

## 2024-02-28 RX ORDER — FUROSEMIDE 20 MG/1
20 TABLET ORAL DAILY
Qty: 28 TABLET | OUTPATIENT
Start: 2024-02-28

## 2024-02-28 RX ORDER — ENALAPRIL MALEATE 5 MG/1
5 TABLET ORAL DAILY
Qty: 21 TABLET | OUTPATIENT
Start: 2024-02-28

## 2024-02-28 RX ORDER — POTASSIUM CHLORIDE 750 MG/1
10 TABLET, EXTENDED RELEASE ORAL DAILY
Qty: 28 TABLET | OUTPATIENT
Start: 2024-02-28

## 2024-03-04 RX ORDER — ENALAPRIL MALEATE 5 MG/1
5 TABLET ORAL DAILY
Qty: 90 TABLET | Refills: 3 | Status: SHIPPED | OUTPATIENT
Start: 2024-03-04

## 2024-03-04 RX ORDER — CLOPIDOGREL BISULFATE 75 MG/1
75 TABLET ORAL DAILY
Qty: 90 TABLET | Refills: 3 | Status: SHIPPED | OUTPATIENT
Start: 2024-03-04

## 2024-03-12 RX ORDER — POTASSIUM CHLORIDE 750 MG/1
10 TABLET, EXTENDED RELEASE ORAL DAILY
Qty: 28 TABLET | OUTPATIENT
Start: 2024-03-12

## 2024-03-12 RX ORDER — METOPROLOL TARTRATE 37.5 MG/1
1 TABLET, FILM COATED ORAL 2 TIMES DAILY
Qty: 42 TABLET | OUTPATIENT
Start: 2024-03-12

## 2024-03-12 RX ORDER — FUROSEMIDE 20 MG/1
20 TABLET ORAL DAILY
Qty: 28 TABLET | OUTPATIENT
Start: 2024-03-12

## 2024-03-18 RX ORDER — METOPROLOL TARTRATE 37.5 MG/1
37.5 TABLET, FILM COATED ORAL 2 TIMES DAILY
Qty: 60 TABLET | Refills: 3 | Status: SHIPPED | OUTPATIENT
Start: 2024-03-18

## 2024-03-18 RX ORDER — POTASSIUM CHLORIDE 750 MG/1
20 TABLET, EXTENDED RELEASE ORAL
Qty: 14 TABLET | Refills: 1 | Status: SHIPPED | OUTPATIENT
Start: 2024-03-18 | End: 2024-04-01

## 2024-03-18 RX ORDER — FUROSEMIDE 20 MG/1
40 TABLET ORAL DAILY
Qty: 14 TABLET | Refills: 0 | Status: SHIPPED | OUTPATIENT
Start: 2024-03-18 | End: 2024-03-25

## 2024-03-27 RX ORDER — POTASSIUM CHLORIDE 750 MG/1
20 TABLET, EXTENDED RELEASE ORAL
Qty: 14 TABLET | Refills: 1 | Status: SHIPPED | OUTPATIENT
Start: 2024-03-27

## 2024-03-27 RX ORDER — FUROSEMIDE 20 MG/1
40 TABLET ORAL DAILY
Qty: 14 TABLET | Refills: 0 | Status: SHIPPED | OUTPATIENT
Start: 2024-03-27 | End: 2024-04-03

## 2024-04-03 RX ORDER — FUROSEMIDE 20 MG/1
40 TABLET ORAL DAILY
Qty: 14 TABLET | Refills: 0 | Status: SHIPPED | OUTPATIENT
Start: 2024-04-03 | End: 2024-04-10

## 2024-04-25 ENCOUNTER — TELEPHONE (OUTPATIENT)
Dept: CARDIOLOGY CLINIC | Age: 59
End: 2024-04-25

## 2024-04-25 RX ORDER — ENALAPRIL MALEATE 10 MG/1
10 TABLET ORAL DAILY
Qty: 30 TABLET | Refills: 1 | Status: SHIPPED | OUTPATIENT
Start: 2024-04-25

## 2024-04-25 NOTE — TELEPHONE ENCOUNTER
Jyoti called pt bp has been running high 3-21 144/86 since then 190/90 3-28, see him once a week and been high, today 150/90  nurse( 188/12 earlier today per patient.)

## 2024-04-25 NOTE — TELEPHONE ENCOUNTER
Called home care and spoke to Farzana notified her that per Juan CNP patient is to increase vasotec to 10 mg orally daily and new script sent to pharmacy. Farzana verbalized understanding of order given.

## 2024-05-09 RX ORDER — FUROSEMIDE 20 MG/1
40 TABLET ORAL DAILY
Qty: 14 TABLET | Refills: 0 | Status: SHIPPED | OUTPATIENT
Start: 2024-05-09 | End: 2024-05-16

## 2024-05-09 RX ORDER — POTASSIUM CHLORIDE 750 MG/1
20 TABLET, EXTENDED RELEASE ORAL
Qty: 14 TABLET | Refills: 1 | Status: SHIPPED | OUTPATIENT
Start: 2024-05-09

## 2024-05-16 ENCOUNTER — TELEPHONE (OUTPATIENT)
Dept: CARDIOLOGY CLINIC | Age: 59
End: 2024-05-16

## 2024-05-16 NOTE — TELEPHONE ENCOUNTER
Pt's home health care provider Jyoti called in and states that the pharmacy has not received pt's RX, according to chart they were sent in on 05/9/24. Lasix 20mg and Potassium chloride.    Jyoti continues to worry about BP and pulse.

## 2024-05-16 NOTE — TELEPHONE ENCOUNTER
Spoke with Jyoti she stated someone just called her back and stated they was going to call the pharmacy to see why patient is not getting their meds.  She is going to call me back if she does not hear from this person. (She could not give me a name to who she spoke to)

## 2024-05-17 ENCOUNTER — TELEPHONE (OUTPATIENT)
Dept: CARDIOLOGY CLINIC | Age: 59
End: 2024-05-17

## 2024-05-17 NOTE — TELEPHONE ENCOUNTER
Again returned the call to Jyoti carolina and potassium were sent to pharmacy but I do not have any answers as to why the patient has not received the medications.

## 2024-05-17 NOTE — TELEPHONE ENCOUNTER
Patient received medication(lasix and potassium) but only received 7 day supply. Would like to know if they plan on being on it long term and if so to call in 90 day supply to [facility].

## 2024-05-17 NOTE — TELEPHONE ENCOUNTER
I returned call to Jyoti and left a message for her to call the office back. I am not aware of what medication or what pharmacy she is referring to so I need to talk to her to see if I can help.

## 2024-05-20 NOTE — TELEPHONE ENCOUNTER
Returned a call from Jyoti to find out what the issue is about the medication. Asked her to give the office a call to get some clarification.

## 2024-05-20 NOTE — TELEPHONE ENCOUNTER
I spoke with Jyoti she stated she talked with someone in the [place] office and this was taken care of. Asked her to call the office back if she needs any assistance.

## 2024-06-17 RX ORDER — METOPROLOL TARTRATE 37.5 MG/1
1 TABLET, FILM COATED ORAL 2 TIMES DAILY
Qty: 60 TABLET | Refills: 3 | Status: SHIPPED | OUTPATIENT
Start: 2024-06-17

## 2024-10-07 RX ORDER — METOPROLOL TARTRATE 37.5 MG/1
1 TABLET, FILM COATED ORAL 2 TIMES DAILY
Qty: 56 TABLET | Refills: 3 | Status: SHIPPED | OUTPATIENT
Start: 2024-10-07

## 2024-10-19 NOTE — TELEPHONE ENCOUNTER
Spoke to patient's sister Brenda Cook (HIPAA) regarding upcoming diagnostic cerebral angiogram procedure scheduled with Dr sOmar Mcmahon on Tuesday, 11/7/23 @ 11 am.     Patient was informed of the following:  - Arrive 2 hours before the scheduled procedure - 9 am  - Check in at the main registration desk  - Do not eat or drink anything 8 hours before the scheduled procedure  - Confirmed that patient does NOT have any allergies to IV dye/contrast, shellfish, metal or latex  - Patient does take oral diabetic medication (metformin) and informed to hold this medication starting 24 hours prior to procedure and not to restart medication until 48 hours after the procedure   - Take all other prescription medication as directed with a small sip of water  - Continue ASA and Plavix   - Make sure you have someone to drive you home after the procedure as you will not be permitted to drive home    Marcelldenisha Morelconnor agreeable and verbalized understanding.
no

## 2025-01-28 RX ORDER — METOPROLOL TARTRATE 37.5 MG/1
1 TABLET ORAL 2 TIMES DAILY
Qty: 60 TABLET | Refills: 0 | Status: SHIPPED | OUTPATIENT
Start: 2025-01-28

## 2025-01-28 RX ORDER — CLOPIDOGREL BISULFATE 75 MG/1
75 TABLET ORAL DAILY
Qty: 30 TABLET | Refills: 0 | Status: SHIPPED | OUTPATIENT
Start: 2025-01-28

## 2025-02-19 ENCOUNTER — OFFICE VISIT (OUTPATIENT)
Dept: CARDIOLOGY CLINIC | Age: 60
End: 2025-02-19

## 2025-02-19 VITALS
HEART RATE: 63 BPM | HEIGHT: 69 IN | SYSTOLIC BLOOD PRESSURE: 124 MMHG | WEIGHT: 266.4 LBS | DIASTOLIC BLOOD PRESSURE: 88 MMHG | BODY MASS INDEX: 39.46 KG/M2

## 2025-02-19 DIAGNOSIS — R94.31 ABNORMAL ELECTROCARDIOGRAPHY: ICD-10-CM

## 2025-02-19 DIAGNOSIS — I10 PRIMARY HYPERTENSION: Primary | ICD-10-CM
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
## 2025-02-20 ENCOUNTER — TELEPHONE (OUTPATIENT)
Dept: CARDIOLOGY CLINIC | Age: 60
End: 2025-02-20

## 2025-02-21 NOTE — TELEPHONE ENCOUNTER
Test Ordered: Nuclear   /  [facility]  /  Authorization Status: PENDING  /  Tracking# [id number]

## 2025-02-24 NOTE — TELEPHONE ENCOUNTER
Test Ordered: Nuclear  /  Insurance: [facility]  /  Authorization Status: APPROVED:  Auth# [id number], Exp 3/19/25

## 2025-02-26 RX ORDER — METOPROLOL TARTRATE 37.5 MG/1
1 TABLET ORAL 2 TIMES DAILY
Qty: 56 TABLET | Refills: 3 | Status: SHIPPED | OUTPATIENT
Start: 2025-02-26

## 2025-02-26 RX ORDER — CLOPIDOGREL BISULFATE 75 MG/1
75 TABLET ORAL DAILY
Qty: 90 TABLET | Refills: 3 | Status: SHIPPED | OUTPATIENT
Start: 2025-02-26

## (undated) DEVICE — MEDI-TRACE CADENCE ADULT, DEFIBRILLATION ELECTRODE -RTS  (10 PR/PK) - ZOLL: Brand: MEDI-TRACE CADENCE

## (undated) DEVICE — SUTURE PROL SZ 5-0 L36IN NONABSORBABLE BLU L13MM C-1 3/8 8720H

## (undated) DEVICE — INTRODUCER SHTH THN WALLED 5 FRX10 CM 22 GA ANGLED RAIN SHTH

## (undated) DEVICE — DEVICE RESUS AD TB L40IN SELF INFL MASK TEXT BG DBL SWVL EL

## (undated) DEVICE — TUBING INSUFFLATOR HEAT HI FLO SET PNEUMOCLEAR

## (undated) DEVICE — BLANKET WRM W35.4XL86.6IN FULL UNDERBODY + FORC AIR

## (undated) DEVICE — SYRINGE MED 30ML STD CLR PLAS LUERLOCK TIP N CTRL DISP

## (undated) DEVICE — SUTURE VCRL SZ 3-0 L36IN ABSRB UD L36MM CT-1 1/2 CIR J944H

## (undated) DEVICE — BLADE OPHTH D5MM 15DEG GRN W/ RND KNURLED HNDL MICRO-SHARP

## (undated) DEVICE — 1LYRTR 16FR10ML100%SILTMPS SNP: Brand: MEDLINE INDUSTRIES, INC.

## (undated) DEVICE — CLIP LIG M BLU TI HRT SHP WIRE HORZ 600 PER BX

## (undated) DEVICE — CLIP SM RED INTERN HMOCLP TITAN LIGATING

## (undated) DEVICE — BAG TRNSF AUTOLGS SUCT AND ANTICOAG LN AUTOLOG

## (undated) DEVICE — DISK-SHAPED STYLE, SILICONE (1 PER STERILE PKG): Brand: SCANLAN® RADIOMARK® GRAFT MARKERS

## (undated) DEVICE — SYSTEM ENDOSCP VES HARV W/ TOOL CANN SEAL SHT PRT BLNT TIP

## (undated) DEVICE — SUTURE NONABSORBABLE MONOFILAMENT 7-0 BV-1 1X24 IN PROLENE 8702H

## (undated) DEVICE — SUTURE MCRYL SZ 3-0 L27IN ABSRB UD L24MM PS-1 3/8 CIR PRIM Y936H

## (undated) DEVICE — TUBING SUCT 9 11FR L475IN RIG SHFT MINI SUC TIP DLP

## (undated) DEVICE — CANNULA SUCT TIP L8MM DIA24FR INTCARD RIG SUC 0.25IN CONN

## (undated) DEVICE — OPEN HRT PK

## (undated) DEVICE — CLAMP INSERT: Brand: STEALTH® CLAMP INSERT

## (undated) DEVICE — PROVE COVER: Brand: UNBRANDED

## (undated) DEVICE — 6 FOOT DISPOSABLE EXTENSION CABLE WITH SAFE CONNECT / SCREW-DOWN

## (undated) DEVICE — CANNULA PERF VEN 36/46 FRX15 IN TWO STG OVL BSKT MC2

## (undated) DEVICE — GOWN,SLEEVE,STERILE,W/CSR WRAP,1/P: Brand: MEDLINE

## (undated) DEVICE — VESSEL LOOPS X-RAY DETECTABLE: Brand: DEROYAL

## (undated) DEVICE — SUTURE VCRL 2-0 L36IN ABSRB UD CTX L48MM 1/2 CIR TAPERPOINT J979H

## (undated) DEVICE — SUTURE SZ 7 L18IN NONABSORBABLE SIL CCS L48MM 1/2 CIR STRNM M655G

## (undated) DEVICE — TOWEL,OR,DSP,ST,WHITE,DLX,XR,4/PK,20PK/C: Brand: MEDLINE

## (undated) DEVICE — SUTURE VCRL SZ 4-0 L18IN ABSRB UD L19MM PS-2 3/8 CIR PRIM J496H

## (undated) DEVICE — SYRINGE MED 5ML STD CLR PLAS LUERLOCK TIP N CTRL DISP

## (undated) DEVICE — SUTURE PERMA-HAND 1 L30IN NONABSORBABLE BLK SILK BRAID W/O SA87G

## (undated) DEVICE — SUTURE ETHBND SZ 2-0 L30IN NONABSORBABLE GRN V-7 L26MM 1/2 X977H

## (undated) DEVICE — SUTURE VCRL SZ 1 L36IN ABSRB UD CTX L48MM 1/2 CIR J977H

## (undated) DEVICE — AGENT HEMSTAT W6XL9IN OXIDIZED REGENERATED CELOS ABSRB FOR

## (undated) DEVICE — CATHETER IV SHIELDED 1.77 INX16 GA 1.4X1.7 MM BLD CTRL GRY

## (undated) DEVICE — 4-PORT MANIFOLD: Brand: NEPTUNE 2

## (undated) DEVICE — SENSOR PLSE OXMTR AD CBL L3FT ADH TRANSMISSIVE

## (undated) DEVICE — SUTURE NABSORBABLE L18IN SZ 8-0 BLU BV175-6 L8MM 3/8 CIR M8742

## (undated) DEVICE — 3M™ STERI-DRAPE™ INSTRUMENT POUCH 1018: Brand: STERI-DRAPE™

## (undated) DEVICE — APPLICATOR MEDICATED 26 CC SOLUTION HI LT ORNG CHLORAPREP

## (undated) DEVICE — DECANTER BAG 9": Brand: MEDLINE INDUSTRIES, INC.

## (undated) DEVICE — BLADE OPHTH 180DEG CUT SURF BLU STR SHRP DBL BVL GRINDLESS

## (undated) DEVICE — MARKER SURG SKIN UTIL REGULAR/FINE 2 TIP W/ RUL AND 9 LBL

## (undated) DEVICE — RADIFOCUS GLIDEWIRE: Brand: GLIDEWIRE

## (undated) DEVICE — ANGIOGRAPHY KIT CUST MANIFOLD

## (undated) DEVICE — CANNULA 96600 117 BIO MEDICUS 17FR EA

## (undated) DEVICE — GAUZE,SPONGE,4"X4",8PLY,STRL,LF,10/TRAY: Brand: MEDLINE

## (undated) DEVICE — KIT MICROINTRODUCER 4FR ECHOGENIC NDL L7CM 21GA STIFF COAX

## (undated) DEVICE — TOWEL,OR,DSP,ST,BLUE,STD,6/PK,12PK/CS: Brand: MEDLINE

## (undated) DEVICE — AGENT HEMSTAT 3GM OXIDIZED REGENERATED CELOS ABSRB FOR CONT (ORDER MULTIPLES OF 5EA)

## (undated) DEVICE — SUTURE PROL SZ 4-0 L36IN NONABSORBABLE BLU L26MM SH 1/2 CIR 8521H

## (undated) DEVICE — SUMP INTCARD W/ 1/4INCH CONN 20FRENCH 15INCH DLP

## (undated) DEVICE — CANNULA PERF L5.5IN DIA9FR AORT ROOT AG STD TIP W/ VENT LN

## (undated) DEVICE — CATHETER IV 22GA L1IN OD0.8382-0.9144MM ID0.6096-0.6858MM 382523

## (undated) DEVICE — DRAIN SURG L3/8-1/2IN DIA3/16IN SIL CARD CONN 1:1 BLAK

## (undated) DEVICE — GUIDEWIRE VASC L260CM DIA0.035IN RAD 3MM J TIP L7CM PTFE

## (undated) DEVICE — KIT,ANTI FOG,W/SPONGE & FLUID,SOFT PACK: Brand: MEDLINE

## (undated) DEVICE — PLEDGET VASC W3/16XL3/8IN THK1/16IN PTFE SFT

## (undated) DEVICE — LEAD PACE L475MM CHNL A OR V MYOCARDIAL STEROID ELUT SIL

## (undated) DEVICE — CANNULA PERF ART 20 FRX8 IN 3/8 IN VENTED W/O FLANGE DLP

## (undated) DEVICE — RESUSCITATOR,MANUAL,ADLT,MASK,TUBE RES: Brand: MEDLINE INDUSTRIES, INC.

## (undated) DEVICE — STERILE LATEX POWDER FREE SURGICAL GLOVES WITH HYDROGEL COATING: Brand: PROTEXIS

## (undated) DEVICE — CONNECTOR DRNGE W3/8-0.5XH3/16XL3/16IN 2:1 SIL CARD STR

## (undated) DEVICE — COVER,TABLE,44X90,STERILE: Brand: MEDLINE

## (undated) DEVICE — SUTURE PROL SZ 3-0 L36IN NONABSORBABLE BLU L26MM SH 1/2 CIR 8522H

## (undated) DEVICE — DRAIN SURG SGL COLL PT TB FOR ATS BG OASIS

## (undated) DEVICE — SET ADMIN PRIMING 67ML L105IN NVENT 180UM FLTR 3 RLER CLMP

## (undated) DEVICE — DEVICE COMPR LNG 27 CM VASC BND

## (undated) DEVICE — SPONGE LAP W18XL18IN WHT COT 4 PLY FLD STRUNG RADPQ DISP ST 2 PER PACK

## (undated) DEVICE — ROTATING SURGICAL PUNCHES, 1 PER POUCH: Brand: A&E MEDICAL / ROTATING SURGICAL PUNCHES

## (undated) DEVICE — ELECTRODE, BLADE, 4', SIL-INS SS: Brand: MEDLINE

## (undated) DEVICE — CATHETER INFUSION SINGLE LUMEN 7 FRX6 IN SLIC

## (undated) DEVICE — AGENT HEMOSTATIC SURGIFLOW MATRIX KIT W/THROMBIN

## (undated) DEVICE — STERNUM SAW BLADE, 10 X 35 X 0.6 MM: Brand: CONMED

## (undated) DEVICE — CATHETER CV KT 9 FRX11.5 CM DL FOR 7.5-8 FR INTRO NDL MAC

## (undated) DEVICE — DRAIN,WOUND,ROUND,24FR,5/16",FULL-FLUTED: Brand: MEDLINE

## (undated) DEVICE — DRAPE,SPLIT,CVMAX ,CLEAR: Brand: MEDLINE

## (undated) DEVICE — CATHETER ANGIO 4FR L100CM S STL NYL JL3.5 3 SEG BRAID SFT

## (undated) DEVICE — SUTURE NRLN SZ 1 L18IN NONABSORBABLE BLK L36MM CT-1 1/2 CIR C520D

## (undated) DEVICE — ADAPTER Y TYP COR PERF FEM LUER W WHT CLMP

## (undated) DEVICE — SUTURE PROL SZ 6-0 L30IN NONABSORBABLE BLU L13MM RB-2 1/2 8711H

## (undated) DEVICE — SET ADMIN L105IN 10GTT 3 NDL FREE CK VLV 2 PC M LUERLOCK

## (undated) DEVICE — STRIP POL W0.15XL7IN REFIL COARSE M ALUM OXIDE FNSH SOF-LEX

## (undated) DEVICE — CATHETER DIAG AD 4FR L100CM STD NYL JUDKINS R 4 TRULUMEN

## (undated) DEVICE — Device

## (undated) DEVICE — SENSOR OXMTR SM AD DISP FOR INVOS SYS